# Patient Record
Sex: FEMALE | Employment: OTHER | URBAN - METROPOLITAN AREA
[De-identification: names, ages, dates, MRNs, and addresses within clinical notes are randomized per-mention and may not be internally consistent; named-entity substitution may affect disease eponyms.]

---

## 2017-02-07 ENCOUNTER — OFFICE VISIT (OUTPATIENT)
Dept: VASCULAR SURGERY | Age: 62
End: 2017-02-07

## 2017-02-07 VITALS
HEART RATE: 78 BPM | BODY MASS INDEX: 51.61 KG/M2 | RESPIRATION RATE: 20 BRPM | SYSTOLIC BLOOD PRESSURE: 144 MMHG | HEIGHT: 59 IN | WEIGHT: 256 LBS | DIASTOLIC BLOOD PRESSURE: 80 MMHG

## 2017-02-07 DIAGNOSIS — R09.89 BILATERAL CAROTID BRUITS: Primary | ICD-10-CM

## 2017-02-07 RX ORDER — BENZONATATE 100 MG/1
100 CAPSULE ORAL
COMMUNITY
End: 2018-08-06

## 2017-02-07 RX ORDER — GUAIFENESIN 100 MG/5ML
200 SOLUTION ORAL
COMMUNITY
End: 2018-08-06

## 2017-02-07 NOTE — MR AVS SNAPSHOT
Visit Information Date & Time Provider Department Dept. Phone Encounter #  
 2/7/2017  9:30 AM Elvie [facility] [phone] [id number] Follow-up Instructions Return in about 3 weeks (around 2/28/2017). Your Appointments 2/10/2017 10:45 AM  
Office Visit with Earnstine Bence, MD [address] [place] [address]) Appt Note: 3 month follow up appointment [address] [place] [address]  
  
   
 [address]  
  
    
 2/28/2017 11:00 AM  
PROCEDURE with [facility]  
[facility] (DILAN SCHEDULING) Appt Note: cv 3 wks darryn [address] [address]  
[phone] [address] [place] [address]  
  
    
 3/7/2017 10:15 AM  
Follow Up with BRIDGET Ramsey  
[facility] (DILAN SCHEDULING) Appt Note: 3 weeks fup after studies at the [address] [place] [address]. PT ok to see Sushil Della [address] [address]  
[phone]  
  
   
 [address] [address] 5/24/2017  1:30 PM  
XRAY Visit with Kylee Hong [facility]. [address] ([facility]) Appt Note: 6 month f/u; kub prior and CMP  
 [address] [facility] [phone]  
  
    
 5/24/2017  1:45 PM  
ESTABLISHED PATIENT with Krzysztof Talley MD  
[facility]. [address] [facility]) Appt Note: 6 month f/u; kub prior and CMP  
 [address] [facility] [address]  
[phone]  
  
   
 [facility] 68 [address] Upcoming Health Maintenance Date Due Hepatitis C Screening 1955 FOOT EXAM Q1 5/2/1963 MICROALBUMIN Q1 5/2/1963 EYE EXAM RETINAL OR DILATED Q1 5/2/1963 Pneumococcal 19-64 Highest Risk (1 of 3 - PCV13) 5/2/1972 DTaP/Tdap/Td series (1 - Tdap) 5/2/1974 PAP AKA CERVICAL CYTOLOGY 5/2/1974 FOBT Q 1 YEAR AGE 50-75 5/2/2003 HEMOGLOBIN A1C Q6M 3/25/2011 LIPID PANEL Q1 9/25/2011 ZOSTER VACCINE AGE 60> 5/2/2013 INFLUENZA AGE 9 TO ADULT 8/1/2016 BREAST CANCER SCRN MAMMOGRAM 9/22/2017 Allergies as of 2/7/2017  Review Complete On: 2/7/2017 By: Kya Green MD  
  
 Severity Noted Reaction Type Reactions Ampicillin  02/16/2012    Hives Ceftriaxone  11/16/2016    Other (comments) Patient received medication for UTI treatment and immediately developed itching and anxiousness. Smiley  06/05/2015    Hives Not allergic anymore, it was only the pesticide Current Immunizations  Reviewed on 7/3/2015 No immunizations on file. Not reviewed this visit You Were Diagnosed With   
  
 Codes Comments Bilateral carotid bruits    -  Primary ICD-10-CM: R09.89 ICD-9-CM: 426. 9 Vitals BP Pulse Resp Height(growth percentile) Weight(growth percentile) BMI  
 144/80 (BP 1 Location: Left arm, BP Patient Position: Sitting) 78 20 4' 11\" (1.499 m) 256 lb (116.1 kg) 51.71 kg/m2 OB Status Smoking Status Hysterectomy Never Smoker Vitals History BMI and BSA Data Body Mass Index Body Surface Area 51.71 kg/m 2 2.2 m 2 Preferred Pharmacy Pharmacy Name [facility]  [address] [place] [address], [address] [place] [address],[address] [place] [address] [phone] Your Updated Medication List  
  
   
This list is accurate as of: 2/7/17 10:14 AM.  Always use your most recent med list.  
  
  
  
  
 acetohydroxamic acid 250 mg Tab Commonly known as:  Black & Pink Take 1 Tab by mouth two (2) times daily (after meals). Indications: BACTERIAL URINARY TRACT INFECTION, STRUVITE RENAL CALCULI  
  
 benzonatate 100 mg capsule Commonly known as:  TESSALON Take 100 mg by mouth three (3) times daily as needed for Cough. Cholecalciferol (Vitamin D3) 50,000 unit Cap Take  by mouth every seven (7) days. guaiFENesin 100 mg/5 mL liquid Commonly known as:  ROBITUSSIN Take 200 mg by mouth three (3) times daily as needed for Cough. HumaLOG KwikPen 100 unit/mL kwikpen Generic drug:  insulin lispro 10 Units by SubCUTAneous route Before breakfast, lunch, dinner and at bedtime. letrozole 2.5 mg tablet Commonly known as:  [facility] Take 1 Tab by mouth daily. LEVOTHYROXINE PO Take 0.88 mg by mouth once. losartan 50 mg tablet Commonly known as:  COZAAR  
daily. METANX 3-35-2 mg Tab tab Generic drug:  L-Mfolate-B6 Phos-Methyl-B12 Take 2.8 mg by mouth.  
  
 multivitamin tablet Commonly known as:  ONE A DAY Take 1 Tab by mouth daily. oxyCODONE-acetaminophen 5-325 mg per tablet Commonly known as:  PERCOCET  
1 or 2 tablets by mouth every 4 hours as needed  
  
 temazepam 15 mg capsule Commonly known as:  RESTORIL Take 1 tab po Q HS may, repeat in 1 hour if needed VITAMIN C 500 mg tablet Generic drug:  ascorbic acid (vitamin C) Take  by mouth. ZyrTEC 10 mg tablet Generic drug:  cetirizine Take  by mouth daily. Follow-up Instructions Return in about 3 weeks (around 2/28/2017). To-Do List   
 02/17/2017 Imaging:  DUPLEX CAROTID BILATERAL AMB Introducing hospitals & HEALTH SERVICES! Todd Fontana introduces [employer] patient portal. Now you can access parts of your medical record, email your doctor's office, and request medication refills online. 1. In your internet browser, go to [email or web address] 2. Click on the First Time User? Click Here link in the Sign In box. You will see the New Member Sign Up page. 3. Enter your [employer] Access Code exactly as it appears below. You will not need to use this code after youve completed the sign-up process. If you do not sign up before the expiration date, you must request a new code. · [employer] Access Code: [email or web address] Expires: 5/8/2017  9:32 AM 
 
4. Enter the last four digits of your Social Security Number (xxxx) and Date of Birth (mm/dd/yyyy) as indicated and click Submit. You will be taken to the next sign-up page. 5. Create a [employer] ID. This will be your [employer] login ID and cannot be changed, so think of one that is secure and easy to remember. 6. Create a [employer] password. You can change your password at any time. 7. Enter your Password Reset Question and Answer. This can be used at a later time if you forget your password. 8. Enter your e-mail address. You will receive e-mail notification when new information is available in [address] 9. Click Sign Up. You can now view and download portions of your medical record. 10. Click the Download Summary menu link to download a portable copy of your medical information. If you have questions, please visit the Frequently Asked Questions section of the [employer] website. Remember, [employer] is NOT to be used for urgent needs. For medical emergencies, dial 911. Now available from your iPhone and Android! Please provide this summary of care documentation to your next provider. Your primary care clinician is listed as Jorge Paredes. If you have any questions after today's visit, please call [phone].

## 2017-02-07 NOTE — PROGRESS NOTES
[facility]    Chief Complaint   Patient presents with    New Patient    Carotid Artery Stenosis       History and Physical    Most pleasant 57-year-old female sent for evaluation of carotid bruit today. She was seeing her OB/GYN Dr. Nando Lara and a carotid bruit was auscultated. She does have a strong family history for stroke. Her risk factors include hypertension, diabetes, and age. She is a non-smoker with no history of renal failure. No history of chest pain or shortness of breath. No cardiovascular disease with the exception of hypertension. She has been treated for breast cancer she has a right mastectomy she does have some resulting lymphedema with a very thorough lymph node dissection. She has been cancer free since her surgery. She is also treated for asthma urinary tract infections and periodic kidney stones. She does not have claudication.   She works full-time at [employer] History   Diagnosis Date    Arthritis     Asthma     Asthma     [facility]     Right Breast    Cancer of breast ([facility] 02/18/14     pt states she received the call last Tuesday    Chemotherapy convalescence or palliative care     Diabetes [facility]     Diabetes mellitus     Essential hypertension     Family history of breast cancer     GERD (gastroesophageal reflux disease)     Hypertension     Kidney stone     Morbid obesity ([facility]     Nausea & vomiting     Radiation therapy complication     Renal colic     S/P breast biopsy - right breast calcifications 10/18/10     Staghorn calculus     Thyroid dysfunction      GOITER    UTI (lower urinary tract infection)      Patient Active Problem List   Diagnosis Code    Family history of breast cancer Z80.3    S/P breast biopsy - right breast calcifications 10/18/10 Z98.890    DM (diabetes mellitus) ([facility] E11.9    Breast CA ([facility] C50.919    Skin infection L08.9    Lymphedema of upper extremity following lymphadenectomy E89.89, I89.0  Insomnia G47.00    Arthritis M19.90    Personal history of malignant neoplasm of breast Z85.3     Past Surgical History   Procedure Laterality Date    Cystoscopy      Hx cyst removal      Hx lithotripsy      Hx breast biopsy  10/18/10     stereotactic - right breast calcifications    Hx tubal ligation      Hx gyn       urethra reconstruction x3    Hx hysterectomy      Hx urological       4 diverticuli repaired    Hx breast biopsy  2/14/2014     RIGHT BREAST BIOPSY performed by Fanny Morgan MD at [address] [place] [address] modified radical mastectomy  3/21/2014     RIGHT MODIFIED RADICAL MASTECTOMY SENTINAL LYMPH NODE BIOPSY performed by Fanny Morgan MD at [address] [place] [address] Hx vascular access  2014     Current Outpatient Prescriptions   Medication Sig Dispense Refill    benzonatate (TESSALON) 100 mg capsule Take 100 mg by mouth three (3) times daily as needed for Cough.  guaiFENesin (ROBITUSSIN) 100 mg/5 mL liquid Take 200 mg by mouth three (3) times daily as needed for Cough.  acetohydroxamic acid (LITHOSTAT) 250 mg tab Take 1 Tab by mouth two (2) times daily (after meals). Indications: BACTERIAL URINARY TRACT INFECTION, STRUVITE RENAL CALCULI 60 Tab 12    oxyCODONE-acetaminophen (PERCOCET) 5-325 mg per tablet 1 or 2 tablets by mouth every 4 hours as needed 120 Tab 0    letrozole (FEMARA) 2.5 mg tablet Take 1 Tab by mouth daily. 90 Tab 3    L-Mfolate-B6 Phos-Methyl-B12 (METANX) 3-35-2 mg tab Take 2.8 mg by mouth.  losartan (COZAAR) 50 mg tablet daily.  HUMALOG KWIKPEN 100 unit/mL kwikpen 10 Units by SubCUTAneous route Before breakfast, lunch, dinner and at bedtime.  temazepam (RESTORIL) 15 mg capsule Take 1 tab po Q HS may, repeat in 1 hour if needed 60 Cap 2    cetirizine (ZYRTEC) 10 mg tablet Take  by mouth daily.  multivitamin (ONE A DAY) tablet Take 1 Tab by mouth daily.  Cholecalciferol, Vitamin D3, 50,000 unit cap Take  by mouth every seven (7) days.       ascorbic acid (VITAMIN C) 500 mg tablet Take  by mouth.  LEVOTHYROXINE SODIUM (LEVOTHYROXINE PO) Take 0.88 mg by mouth once. Allergies   Allergen Reactions    Ampicillin Hives    Ceftriaxone Other (comments)     Patient received medication for UTI treatment and immediately developed itching and anxiousness.  Strawberry Hives     Not allergic anymore, it was only the pesticide     Social History     Social History    Marital status: SINGLE     Spouse name: N/A    Number of children: N/A    Years of education: N/A     Occupational History    Not on file. Social History Main Topics    Smoking status: Never Smoker    Smokeless tobacco: Never Used    Alcohol use 0.5 oz/week     1 Glasses of wine per week      Comment: occasionally    Drug use: No    Sexual activity: Not on file     Other Topics Concern    Not on file     Social History Narrative      Family History   Problem Relation Age of Onset    Breast Cancer Sister     Diabetes Mother     Hypertension Mother     Breast Cancer Sister     Hypertension Other      Parent    Diabetes Other      parent    Stroke Other      parent    Hypertension Other      sibling    Diabetes Other      sibling    Osteoporosis Other      sibling       Review of Systems    A full review of systems was completed times ten organ systems and was deemed negative unless otherwise mentioned in the HPI.      Physical Exam:    Visit Vitals    /80 (BP 1 Location: Left arm, BP Patient Position: Sitting)    Pulse 78    Resp 20    Ht 4' 11\" (1.499 m)    Wt 256 lb (116.1 kg)    BMI 51.71 kg/m2      Youthful appearing 63 no distress  No facial symmetry pupils are reactive smile is symmetrical  Vision speech hearing intact  Neck no JVD soft bruit notable  Chest clear  Cardiac regular  Abdomen soft nontender nondistended  Extremities without edema  She has easily palpable pedal pulses  She does have profound lymphedema of her right upper extremity she has no ischemia she is wearing a lymphedema stocking today  No skin ulcerations notable  Range of motion strength are equal  Date of her chart review of prior CAT scans no evidence of aneurysm  No prior carotid Doppler    Impression and Plan:    ICD-10-CM ICD-9-CM    1. Bilateral carotid bruits R09.89 785.9 DUPLEX CAROTID BILATERAL AMB    will send her for carotid Doppler to evaluate bruit and follow-up with results  Thanks so much for allowing me to opportunity to evaluate her carotid bruit  Orders Placed This Encounter    DUPLEX CAROTID BILATERAL AMB    benzonatate (TESSALON) 100 mg capsule    guaiFENesin (ROBITUSSIN) 100 mg/5 mL liquid       Follow-up Disposition:  Return in about 3 weeks (around 2/28/2017). Kenneth Arriaga MD    PLEASE NOTE:  This document has been produced using voice recognition software. Unrecognized errors in transcription may be present.

## 2017-02-10 ENCOUNTER — OFFICE VISIT (OUTPATIENT)
Dept: ONCOLOGY | Age: 62
End: 2017-02-10

## 2017-02-10 ENCOUNTER — HOSPITAL ENCOUNTER (OUTPATIENT)
Dept: LAB | Age: 62
Discharge: HOME | End: 2017-02-10
Payer: COMMERCIAL

## 2017-02-10 ENCOUNTER — HOSPITAL ENCOUNTER (OUTPATIENT)
Dept: ONCOLOGY | Age: 62
Discharge: HOME | End: 2017-02-10

## 2017-02-10 VITALS
WEIGHT: 251 LBS | HEART RATE: 91 BPM | TEMPERATURE: 98.3 F | HEIGHT: 59 IN | DIASTOLIC BLOOD PRESSURE: 81 MMHG | SYSTOLIC BLOOD PRESSURE: 140 MMHG | BODY MASS INDEX: 50.6 KG/M2

## 2017-02-10 DIAGNOSIS — E89.89 LYMPHEDEMA OF UPPER EXTREMITY FOLLOWING LYMPHADENECTOMY: ICD-10-CM

## 2017-02-10 DIAGNOSIS — C50.919 MALIGNANT NEOPLASM OF FEMALE BREAST, UNSPECIFIED LATERALITY, UNSPECIFIED SITE OF BREAST: ICD-10-CM

## 2017-02-10 DIAGNOSIS — M19.90 ARTHRITIS: ICD-10-CM

## 2017-02-10 DIAGNOSIS — C50.919 MALIGNANT NEOPLASM OF FEMALE BREAST, UNSPECIFIED LATERALITY, UNSPECIFIED SITE OF BREAST: Primary | ICD-10-CM

## 2017-02-10 DIAGNOSIS — I89.0 LYMPHEDEMA OF UPPER EXTREMITY FOLLOWING LYMPHADENECTOMY: ICD-10-CM

## 2017-02-10 LAB
ALBUMIN SERPL BCP-MCNC: 3.4 G/DL (ref 3.4–5)
ALBUMIN/GLOB SERPL: 0.9 {RATIO} (ref 0.8–1.7)
ALP SERPL-CCNC: 153 U/L (ref 45–117)
ALT SERPL-CCNC: 20 U/L (ref 13–56)
ANION GAP BLD CALC-SCNC: 7 MMOL/L (ref 3–18)
AST SERPL W P-5'-P-CCNC: 8 U/L (ref 15–37)
BASO+EOS+MONOS # BLD AUTO: 0.4 K/UL (ref 0–2.3)
BASO+EOS+MONOS # BLD AUTO: 5 % (ref 0.1–17)
BILIRUB SERPL-MCNC: 0.3 MG/DL (ref 0.2–1)
BUN SERPL-MCNC: 18 MG/DL (ref 7–18)
BUN/CREAT SERPL: 13 (ref 12–20)
CALCIUM SERPL-MCNC: 8.6 MG/DL (ref 8.5–10.1)
CHLORIDE SERPL-SCNC: 106 MMOL/L (ref 100–108)
CO2 SERPL-SCNC: 28 MMOL/L (ref 21–32)
CREAT SERPL-MCNC: 1.41 MG/DL (ref 0.6–1.3)
DIFFERENTIAL METHOD BLD: ABNORMAL
ERYTHROCYTE [DISTWIDTH] IN BLOOD BY AUTOMATED COUNT: 13.2 % (ref 11.5–14.5)
GLOBULIN SER CALC-MCNC: 3.7 G/DL (ref 2–4)
GLUCOSE SERPL-MCNC: 297 MG/DL (ref 74–99)
HCT VFR BLD AUTO: 37.1 % (ref 36–48)
HGB BLD-MCNC: 12.3 G/DL (ref 12–16)
LYMPHOCYTES # BLD AUTO: 26 % (ref 14–44)
LYMPHOCYTES # BLD: 2 K/UL (ref 1.1–5.9)
MCH RBC QN AUTO: 25.8 PG (ref 25–35)
MCHC RBC AUTO-ENTMCNC: 33.2 G/DL (ref 31–37)
MCV RBC AUTO: 77.9 FL (ref 78–102)
NEUTS SEG # BLD: 5.5 K/UL (ref 1.8–9.5)
NEUTS SEG NFR BLD AUTO: 69 % (ref 40–70)
PLATELET # BLD AUTO: 313 K/UL (ref 140–440)
POTASSIUM SERPL-SCNC: 4.7 MMOL/L (ref 3.5–5.5)
PROT SERPL-MCNC: 7.1 G/DL (ref 6.4–8.2)
RBC # BLD AUTO: 4.76 M/UL (ref 4.1–5.1)
SODIUM SERPL-SCNC: 141 MMOL/L (ref 136–145)
WBC # BLD AUTO: 7.9 K/UL (ref 4.5–13)

## 2017-02-10 PROCEDURE — 36415 COLL VENOUS BLD VENIPUNCTURE: CPT | Performed by: NURSE PRACTITIONER

## 2017-02-10 PROCEDURE — 80053 COMPREHEN METABOLIC PANEL: CPT | Performed by: NURSE PRACTITIONER

## 2017-02-10 PROCEDURE — 86300 IMMUNOASSAY TUMOR CA 15-3: CPT | Performed by: NURSE PRACTITIONER

## 2017-02-10 RX ORDER — IBUPROFEN 600 MG/1
600 TABLET ORAL
Qty: 120 TAB | Refills: 0 | Status: SHIPPED | OUTPATIENT
Start: 2017-02-10 | End: 2018-04-10 | Stop reason: SDUPTHER

## 2017-02-10 RX ORDER — OXYCODONE AND ACETAMINOPHEN 5; 325 MG/1; MG/1
TABLET ORAL
Qty: 120 TAB | Refills: 0 | Status: SHIPPED | OUTPATIENT
Start: 2017-02-10 | End: 2017-05-12 | Stop reason: SDUPTHER

## 2017-02-10 NOTE — PROGRESS NOTES
Hematology/Oncology  Progress Note    Name: Cheli Screen  Date: 2/10/2017  : 1955      Ms. Marylu Javier is a 59year old female who was seen for management of her invasive ductal adenocarcinoma right and anemia. Current therapy: Femara 2.5 mg daily and oral iron supplementation daily. Subjective:     Ms. Marylu Javier is a 59-year-old woman who is currently taking Femara 2.5 mg daily as long-term management for her invasive ductal carcinoma of the breast.  She continues to tolerate the medication well. The patient reports that she is doing her breast self-exam on a monthly basis. Additionally, she continues to take the iron supplement once daily. Today she complains of left breast pain for the past week. She also reports that her lymphedema sleeve is too tight. She denies CP or SOB. Her appetite is reasonably well. She continues to use her percocet PRN. She is asking for a refill at this time. Past medical history, family history, and social history are reviewed and remains unchanged.   Past Medical History   Diagnosis Date    Arthritis     Asthma     Asthma     Breast CA ([facility] 2014     Right Breast    Cancer of breast ([facility] 14     pt states she received the call last Tuesday    Chemotherapy convalescence or palliative care     Diabetes ([facility]     Diabetes mellitus     Essential hypertension     Family history of breast cancer     GERD (gastroesophageal reflux disease)     Hypertension     Kidney stone     Morbid obesity ([address]     Nausea & vomiting     Radiation therapy complication     Renal colic     S/P breast biopsy - right breast calcifications 10/18/10     Staghorn calculus     Thyroid dysfunction      GOITER    UTI (lower urinary tract infection)      Past Surgical History   Procedure Laterality Date    Cystoscopy      Hx cyst removal      Hx lithotripsy      Hx breast biopsy  10/18/10     stereotactic - right breast calcifications    Hx tubal ligation      Hx gyn urethra reconstruction x3    Hx hysterectomy      Hx urological       4 diverticuli repaired    Hx breast biopsy  2/14/2014     RIGHT BREAST BIOPSY performed by Francisco Cates MD at [address] [place] [address] Hx modified radical mastectomy  3/21/2014     RIGHT MODIFIED RADICAL MASTECTOMY SENTINAL LYMPH NODE BIOPSY performed by Francisco Cates MD at [address] [place] [address] Hx vascular access  2014     Social History     Social History    Marital status: SINGLE     Spouse name: N/A    Number of children: N/A    Years of education: N/A     Occupational History    Not on file. Social History Main Topics    Smoking status: Never Smoker    Smokeless tobacco: Never Used    Alcohol use 0.5 oz/week     1 Glasses of wine per week      Comment: occasionally    Drug use: No    Sexual activity: Not on file     Other Topics Concern    Not on file     Social History Narrative     Family History   Problem Relation Age of Onset    Breast Cancer Sister     Diabetes Mother     Hypertension Mother     Breast Cancer Sister     Hypertension Other      Parent    Diabetes Other      parent    Stroke Other      parent    Hypertension Other      sibling    Diabetes Other      sibling    Osteoporosis Other      sibling     Current Outpatient Prescriptions   Medication Sig Dispense Refill    oxyCODONE-acetaminophen (PERCOCET) 5-325 mg per tablet 1 or 2 tablets by mouth every 4 hours as needed 120 Tab 0    ibuprofen (MOTRIN) 600 mg tablet Take 1 Tab by mouth every six (6) hours as needed for Pain. 120 Tab 0    benzonatate (TESSALON) 100 mg capsule Take 100 mg by mouth three (3) times daily as needed for Cough.  guaiFENesin (ROBITUSSIN) 100 mg/5 mL liquid Take 200 mg by mouth three (3) times daily as needed for Cough.  acetohydroxamic acid (LITHOSTAT) 250 mg tab Take 1 Tab by mouth two (2) times daily (after meals).  Indications: BACTERIAL URINARY TRACT INFECTION, STRUVITE RENAL CALCULI 60 Tab 12    letrozole [facility]) 2.5 mg tablet Take 1 Tab by mouth daily. 90 Tab 3    L-Mfolate-B6 Phos-Methyl-B12 (METANX) 3-35-2 mg tab Take 2.8 mg by mouth.  losartan (COZAAR) 50 mg tablet daily.  HUMALOG KWIKPEN 100 unit/mL kwikpen 10 Units by SubCUTAneous route Before breakfast, lunch, dinner and at bedtime.  temazepam (RESTORIL) 15 mg capsule Take 1 tab po Q HS may, repeat in 1 hour if needed 60 Cap 2    cetirizine (ZYRTEC) 10 mg tablet Take  by mouth daily.  multivitamin (ONE A DAY) tablet Take 1 Tab by mouth daily.  Cholecalciferol, Vitamin D3, 50,000 unit cap Take  by mouth every seven (7) days.  ascorbic acid (VITAMIN C) 500 mg tablet Take  by mouth.  LEVOTHYROXINE SODIUM (LEVOTHYROXINE PO) Take 0.88 mg by mouth once. Review of Systems  Constitutional: The patient denies acute distress or discomfort  HEENT: The patient denies recent head trauma, eye pain, blurred vision,  hearing deficit, oropharyngeal mucosal pain or lesions, and the patient denies throat pain or discomfort. Lymphatics: The patient denies palpable peripheral lymphadenopathy. Hematologic: The patient denies having bruising, bleeding, or progressive fatigue. Respiratory: Patient denies having shortness of breath, cough, sputum production, fever, or dyspnea on exertion. Cardiovascular: The patient denies having leg pain, leg swelling, heart palpitations, chest permit, chest pain, or lightheadedness. The patient denies having dyspnea on exertion. Gastrointestinal: The patient denies having nausea, emesis, or diarrhea. The patient denies having any hematemesis or blood in the stool. Genitourinary: Patient denies having urinary urgency, frequency, or dysuria. The patient denies having blood in the urine. Psychological: The patient denies having symptoms of nervousness, anxiety, depression, or thoughts of harming himself some of this.   Skin: Patient denies having skin rashes, skin, ulcerations, or unexplained itching or pruritus. Musculoskeletal: The patient has swelling in the right arm due to progressive lymphedema. She is now wearing a lymphedema sleeve. Anterior chest wall and breasts: The patient denies having any palpable mass lesions on her chest wall or breast, but has pain and discomfort in left breast      Objective:     Visit Vitals    /81    Pulse 91    Temp 98.3 °F (36.8 °C)    Ht 4' 11\" (1.499 m)    Wt 113.9 kg (251 lb)    BMI 50.7 kg/m2     ECOG PS=0 Pain score 3/10     Physical Exam:   Gen. Appearance: The patient is in no acute distress. Skin: There is no bruise or rash. HEENT: The exam is unremarkable. Neck: Supple without lymphadenopathy or thyromegaly. Lungs: Clear to auscultation and percussion; there are no wheezes or rhonchi. Heart: Regular rate and rhythm; there are no murmurs, gallops, or rubs. Anterior chest wall. Breasts: There is no evidence of local recurrence of disease. The axilla reveals no palpable axillary lymphadenopathy. Abdomen: Bowel sounds are present and normal.  There is no guarding, tenderness, or hepatosplenomegaly. Extremities: There is no clubbing, cyanosis, or edema. Neurologic: There are no focal neurologic deficits. Lymphatics: There is no palpable peripheral lymphadenopathy. Musculoskeletal: The patient has full range of motion at all joints. There is no evidence of joint deformity or effusions. The patient has  right arm lymphedema extending from the fingers up to the shoulder area on the right. There is  focal joint tenderness in the right axilla. Psychological/psychiatric: There is no clinical evidence of anxiety, depression, or melancholy.     Lab data:      Results for orders placed or performed during the hospital encounter of 02/10/17   CBC WITH 3 PART DIFF     Status: Abnormal   Result Value Ref Range Status    WBC 7.9 4.5 - 13.0 K/uL Final    RBC 4.76 4.10 - 5.10 M/uL Final    HGB 12.3 12.0 - 16.0 g/dL Final    HCT 37.1 36 - 48 % Final    MCV 77.9 (L) 78 - 102 FL Final    MCH 25.8 25.0 - 35.0 PG Final    MCHC 33.2 31 - 37 g/dL Final    RDW 13.2 11.5 - 14.5 % Final    PLATELET 629 157 - 538 K/uL Final    NEUTROPHILS 69 40 - 70 % Final    MIXED CELLS 5 0.1 - 17 % Final    LYMPHOCYTES 26 14 - 44 % Final    ABS. NEUTROPHILS 5.5 1.8 - 9.5 K/UL Final    ABS. MIXED CELLS 0.4 0.0 - 2.3 K/uL Final    ABS. LYMPHOCYTES 2.0 1.1 - 5.9 K/UL Final     Comment: Test performed at [place] [address] Location. Results Reviewed by Medical Director. DF AUTOMATED   Final           Assessment:     1. Malignant neoplasm of female breast, unspecified laterality, unspecified site of breast ([facility]    2. Arthritis    3. Lymphedema of upper extremity following lymphadenectomy        Plan:   Right arm and hand lymphedema:She will be referred to the lymphedema clinic for new measurements and a new sleeve. She is not complaining of any significant pain with the exception of arthritic discomfort in her hands. I advised her to to avoid lifting  more than 10 pounds using the right arm and hand. Breast cancer: I have encouraged patient to continue to her monthly breast self-examinations. At this time I will order a CA 27-29 level, and a comprehensive metabolic panel. The most recent CA 27-29 level was 36.1 at the last visit. I will renew prescription for Percocet 5mg at this time. A mammogram will be ordered, it is time for her yearly exam.    Arthritis: Motrin 600 mg every 6 hours will be ordered and she was instructed to use Percocet for severe pain. I will have the patient return to the clinic again in 4 months.   Orders Placed This Encounter    COMPLETE CBC & AUTO DIFF WBC    InHouse CBC (Pro Breath MD)     Standing Status:   Future     Number of Occurrences:   1     Standing Expiration Date:   0/50/7933    METABOLIC PANEL, COMPREHENSIVE     Standing Status:   Future     Standing Expiration Date:   2/11/2018    CA 27.29     Standing Status:   Future     Standing Expiration Date:   2018    oxyCODONE-acetaminophen (PERCOCET) 5-325 mg per tablet     Si or 2 tablets by mouth every 4 hours as needed     Dispense:  120 Tab     Refill:  0    ibuprofen (MOTRIN) 600 mg tablet     Sig: Take 1 Tab by mouth every six (6) hours as needed for Pain. Dispense:  120 Tab     Refill:  0       John Juan NP  2/10/2017       I have assessed the patient independently and  agree with the full assessment as outlined.   Kaylah Bishop MD, [address]

## 2017-02-10 NOTE — MR AVS SNAPSHOT
Visit Information Date & Time Provider Department Dept. Phone Encounter #  
 2/10/2017 10:45 AM Nataliya Bazzi [address] Office [phone] [id number] Your Appointments 2/28/2017 11:00 AM  
PROCEDURE with [facility]  
[facility] (DILAN SCHEDULING) Appt Note: cv 3 wks darryn [address] [address]  
[phone] [address] [address]  
  
    
 3/7/2017 10:15 AM  
Follow Up with BRIDGET Guallpa  
[facility] (DILAN SCHEDULING) Appt Note: 3 weeks fup after studies at the [address] [place] [address]. PT ok to see Yarelis Levels [address] [address]  
[phone]  
  
   
 [address] [facility] [address] 5/12/2017 11:45 AM  
Office Visit with MD Daniel Echavarriamark [address] [place] [address] [facility]) Appt Note: 3 mo fu  
 [facility] [address] [place] [address]  
[phone]  
  
   
 [facility] [address] 5/24/2017  1:30 PM  
XRAY Visit with Jennifer Velasquez [facility]) Appt Note: 6 month f/u; kub prior and CMP  
 [address] [facility] [address]  
  
    
 5/24/2017  1:45 PM  
ESTABLISHED PATIENT with Eddi Urias MD  
[facility]) Appt Note: 6 month f/u; kub prior and CMP  
 [address] [address]  
[phone]  
  
   
 [facility] [address] Upcoming Health Maintenance Date Due Hepatitis C Screening 1955 FOOT EXAM Q1 5/2/1963 MICROALBUMIN Q1 5/2/1963 EYE EXAM RETINAL OR DILATED Q1 5/2/1963 Pneumococcal 19-64 Highest Risk (1 of 3 - PCV13) 5/2/1972 DTaP/Tdap/Td series (1 - Tdap) 5/2/1974 PAP AKA CERVICAL CYTOLOGY 5/2/1974 FOBT Q 1 YEAR AGE 50-75 5/2/2003 HEMOGLOBIN A1C Q6M 3/25/2011 LIPID PANEL Q1 9/25/2011 ZOSTER VACCINE AGE 60> 5/2/2013 INFLUENZA AGE 9 TO ADULT 8/1/2016 BREAST CANCER SCRN MAMMOGRAM 9/22/2017 Allergies as of 2/10/2017  Review Complete On: 2/7/2017 By: Lilia Pierce MD  
  
 Severity Noted Reaction Type Reactions Ampicillin  02/16/2012    Hives Ceftriaxone  11/16/2016    Other (comments) Patient received medication for UTI treatment and immediately developed itching and anxiousness. [place]  06/05/2015    Hives Not allergic anymore, it was only the pesticide Current Immunizations  Reviewed on 7/3/2015 No immunizations on file. Not reviewed this visit You Were Diagnosed With   
  
 Codes Comments Malignant neoplasm of female breast, unspecified laterality, unspecified site of breast ([facility]    -  Primary ICD-10-CM: C50.919 ICD-9-CM: 174.9 Vitals BP Pulse Temp Height(growth percentile) Weight(growth percentile) BMI  
 140/81 91 98.3 °F (36.8 °C) 4' 11\" (1.499 m) 251 lb (113.9 kg) 50.7 kg/m2 OB Status Smoking Status Hysterectomy Never Smoker BMI and BSA Data Body Mass Index Body Surface Area 50.7 kg/m 2 2.18 m 2 Preferred Pharmacy Pharmacy Name [facility], [address] [place] [address],[address] [place] [address] [phone] Your Updated Medication List  
  
   
This list is accurate as of: 2/10/17 11:55 AM.  Always use your most recent med list.  
  
  
  
  
 acetohydroxamic acid 250 mg Tab Commonly known as:  Black & Pink Take 1 Tab by mouth two (2) times daily (after meals). Indications: BACTERIAL URINARY TRACT INFECTION, STRUVITE RENAL CALCULI  
  
 benzonatate 100 mg capsule Commonly known as:  TESSALON Take 100 mg by mouth three (3) times daily as needed for Cough. Cholecalciferol (Vitamin D3) 50,000 unit Cap Take  by mouth every seven (7) days. guaiFENesin 100 mg/5 mL liquid Commonly known as:  ROBITUSSIN Take 200 mg by mouth three (3) times daily as needed for Cough. HumaLOG KwikPen 100 unit/mL kwikpen Generic drug:  insulin lispro 10 Units by SubCUTAneous route Before breakfast, lunch, dinner and at bedtime. letrozole 2.5 mg tablet Commonly known as:  [facility] Take 1 Tab by mouth daily. LEVOTHYROXINE PO Take 0.88 mg by mouth once. losartan 50 mg tablet Commonly known as:  COZAAR  
daily. METANX 3-35-2 mg Tab tab Generic drug:  L-Mfolate-B6 Phos-Methyl-B12 Take 2.8 mg by mouth.  
  
 multivitamin tablet Commonly known as:  ONE A DAY Take 1 Tab by mouth daily. oxyCODONE-acetaminophen 5-325 mg per tablet Commonly known as:  PERCOCET  
1 or 2 tablets by mouth every 4 hours as needed  
  
 temazepam 15 mg capsule Commonly known as:  RESTORIL Take 1 tab po Q HS may, repeat in 1 hour if needed VITAMIN C 500 mg tablet Generic drug:  ascorbic acid (vitamin C) Take  by mouth. ZyrTEC 10 mg tablet Generic drug:  cetirizine Take  by mouth daily. Prescriptions Printed Refills  
 oxyCODONE-acetaminophen (PERCOCET) 5-325 mg per tablet 0 Si or 2 tablets by mouth every 4 hours as needed Class: Print We Performed the Following COMPLETE CBC & AUTO DIFF WBC [55864 CPT(R)] To-Do List   
 02/10/2017 Lab:  CBC WITH 3 PART DIFF   
  
 2017 Lab:  CA 27.29   
  
 2017 Lab:  METABOLIC PANEL, COMPREHENSIVE Introducing [facility]! Maria De Jesus Rubio introduces [email or web address] patient portal. Now you can access parts of your medical record, email your doctor's office, and request medication refills online. 1. In your internet browser, go to [email or web address] 2. Click on the First Time User? Click Here link in the Sign In box. You will see the New Member Sign Up page. 3. Enter your [email or web address] Access Code exactly as it appears below. You will not need to use this code after youve completed the sign-up process. If you do not sign up before the expiration date, you must request a new code. · [email or web address] Access Code: [email or web address] Expires: 5/8/2017  9:32 AM 
 
4. Enter the last four digits of your Social Security Number (xxxx) and Date of Birth (mm/dd/yyyy) as indicated and click Submit. You will be taken to the next sign-up page. 5. Create a [email or web address] ID. This will be your [email or web address] login ID and cannot be changed, so think of one that is secure and easy to remember. 6. Create a [email or web address] password. You can change your password at any time. 7. Enter your Password Reset Question and Answer. This can be used at a later time if you forget your password. 8. Enter your e-mail address. You will receive e-mail notification when new information is available in [address] 9. Click Sign Up. You can now view and download portions of your medical record. 10. Click the Download Summary menu link to download a portable copy of your medical information. If you have questions, please visit the Frequently Asked Questions section of the [email or web address] website. Remember, [email or web address] is NOT to be used for urgent needs. For medical emergencies, dial 911. Now available from your iPhone and Android! Please provide this summary of care documentation to your next provider. Your primary care clinician is listed as Selin Gonzalez. If you have any questions after today's visit, please call [phone].

## 2017-02-11 LAB — CANCER AG27-29 SERPL-ACNC: 30 U/ML (ref 0–38.6)

## 2017-02-28 ENCOUNTER — OFFICE VISIT (OUTPATIENT)
Dept: VASCULAR SURGERY | Age: 62
End: 2017-02-28

## 2017-02-28 DIAGNOSIS — R09.89 BILATERAL CAROTID BRUITS: ICD-10-CM

## 2017-02-28 NOTE — PROCEDURES
[facility] Vein   *** FINAL REPORT ***    Name: Marty Rush  MRN: [id number]       Outpatient  : 02 May 1955  HIS Order #: [id number]  [id number] [facility] Visit #: [id number]  Date: 2017    TYPE OF TEST: Cerebrovascular Duplex    REASON FOR TEST  Carotid bruit    Right Carotid:-             Proximal               Mid                 Distal  cm/s  Systolic  Diastolic  Systolic  Diastolic  Systolic  Diastolic  CCA:     91.6      16.0                            61.0      21.0  Bulb:  ECA:     92.0      15.0  ICA:     45.0      16.0       38.0      16.0       71.0      32.0  ICA/CCA:  1.2       1.5    ICA Stenosis: <50%    Right Vertebral:-  Finding: Antegrade  Sys:       49.0  Ijeoma:       11.0    Right Subclavian:    Left Carotid:-            Proximal                Mid                 Distal  cm/s  Systolic  Diastolic  Systolic  Diastolic  Systolic  Diastolic  CCA:     47.7      25.0                            62.0      21.0  Bulb:  ECA:     49.0      12.0  ICA:     56.0      20.0       48.0      22.0       56.0      25.0  ICA/CCA:  0.6       0.8    ICA Stenosis: <50%    Left Vertebral:-  Finding: Antegrade  Sys:       55.0  Ijeoma:       20.0    Left Subclavian:    INTERPRETATION/FINDINGS  Duplex images were obtained using 2-D gray scale, color flow and  spectral doppler analysis. 1. Minimal intimal thickening of the right internal carotid artery in  the less than 50% range by criteria. 2. Mild <50% plaquing in the left internal carotid artery. 3. No significant stenosis in the external carotid arteries  bilaterally. 4. Antegrade flow in both vertebral arteries. Plaque Morphology:  1. Homogeneous plaque in the bulb and right ICA. 2. Varying Density plaque in the bulb and left ICA. No prior exam to compare. ADDITIONAL COMMENTS    I have personally reviewed the data relevant to the interpretation of  this  study.     TECHNOLOGIST: Juvencio Alexander RDMS  Signed: 2017 01:32 PM    PHYSICIAN: William Braxton LISANDRO  Signed: 02/28/2017 01:51 PM

## 2017-03-07 ENCOUNTER — OFFICE VISIT (OUTPATIENT)
Dept: VASCULAR SURGERY | Age: 62
End: 2017-03-07

## 2017-03-07 VITALS
WEIGHT: 251 LBS | BODY MASS INDEX: 50.6 KG/M2 | SYSTOLIC BLOOD PRESSURE: 122 MMHG | HEART RATE: 64 BPM | HEIGHT: 59 IN | RESPIRATION RATE: 12 BRPM | DIASTOLIC BLOOD PRESSURE: 78 MMHG

## 2017-03-07 DIAGNOSIS — R09.89 CAROTID BRUIT, UNSPECIFIED LATERALITY: Primary | ICD-10-CM

## 2017-03-07 NOTE — PROGRESS NOTES
Ms Garcia Elizalde had been referred by her OB/GYN a few weeks ago, concerned for a carotid bruit auscultated on a physical exam  She was referred here for evaluation and Dr Steven Ayoub ordered carotid duplex   She is here for results today, and reviewed as follows:    1. Minimal intimal thickening of the right internal carotid artery in  the less than 50% range by criteria. 2. Mild <50% plaquing in the left internal carotid artery. 3. No significant stenosis in the external carotid arteries  bilaterally. 4. Antegrade flow in both vertebral arteries. Plaque Morphology:  1. Homogeneous plaque in the bulb and right ICA. 2. Varying Density plaque in the bulb and left ICA. No prior exam to compare. I explained no significant stenosis noted so only needs surveillance between every 1-2 years  I did stress risk factor control including good management of BP and A1C  I mentioned a statin but she is not fond of the idea due to concern of side effects. I suggested talking with her PCP about the supplement, red yeast rice, and their opinion of that as an alternate therapy.  Of course, also maintain good diet/weight and some type of regular exercise    She is relieved and acknowledges an understanding  Agreed on an 18 month follow up for next interval

## 2017-03-07 NOTE — MR AVS SNAPSHOT
Visit Information Date & Time Provider Department Dept. Phone Encounter #  
 3/7/2017 10:15 AM BRIDGET Mcfarlane [facility] [phone] [id number] Follow-up Instructions Return in about 18 months (around 9/7/2018). Your Appointments 5/12/2017 11:45 AM  
Office Visit with MD Wilda Dean [address] [place] [address] [facility]: 3 mo fu  
 [facility] [address] [facility] [address]  
[phone]  
  
   
 [facility] 9938 [facility] 3500  35 [facility] 5/24/2017  1:30 PM  
XRAY Visit with Jovany Moran [facility] Appt Note: 6 month f/u; kub prior and CMP  
 [address] [address] [address]  
  
   
 [address] [phone]  
  
    
 5/24/2017  1:45 PM  
ESTABLISHED PATIENT with Ady Vivar MD  
[facility]) Appt Note: 6 month f/u; kub prior and CMP  
 [address] [address] [address]  
  
   
 [address] [address]  
  
    
 9/7/2018 10:00 AM  
PROCEDURE with [facility] IMAGING 1  
[facility] (DILAN SCHEDULING) Appt Note: cv 18mos knaak [address] [address]  
[phone] [address] [place] [address]  
  
    
 9/20/2018 10:00 AM  
Follow Up with BRIDGET Mcfarlane  
[facility] (DILAN SCHEDULING) Appt Note: 18 month fu after study at our lab on 9/7/2018  
 [address] [address]  
[phone]  
  
   
 [address] [address] Upcoming Health Maintenance Date Due Hepatitis C Screening 1955 FOOT EXAM Q1 5/2/1963 MICROALBUMIN Q1 5/2/1963 EYE EXAM RETINAL OR DILATED Q1 5/2/1963 Pneumococcal 19-64 Highest Risk (1 of 3 - PCV13) 5/2/1972 DTaP/Tdap/Td series (1 - Tdap) 5/2/1974 PAP AKA CERVICAL CYTOLOGY 5/2/1974 FOBT Q 1 YEAR AGE 50-75 5/2/2003 HEMOGLOBIN A1C Q6M 3/25/2011 LIPID PANEL Q1 9/25/2011 ZOSTER VACCINE AGE 60> 5/2/2013 INFLUENZA AGE 9 TO ADULT 8/1/2016 BREAST CANCER SCRN MAMMOGRAM 9/22/2017 Allergies as of 3/7/2017  Review Complete On: 3/7/2017 By: Kinga Flowers Severity Noted Reaction Type Reactions Ampicillin  02/16/2012    Hives Ceftriaxone  11/16/2016    Other (comments) Patient received medication for UTI treatment and immediately developed itching and anxiousness. [place]  06/05/2015    Hives Not allergic anymore, it was only the pesticide Current Immunizations  Reviewed on 7/3/2015 No immunizations on file. Not reviewed this visit You Were Diagnosed With   
  
 Codes Comments Carotid bruit, unspecified laterality    -  Primary ICD-10-CM: R09.89 ICD-9-CM: 732. 9 Vitals BP Pulse Resp Height(growth percentile) Weight(growth percentile) BMI  
 122/78 (BP 1 Location: Left arm, BP Patient Position: Sitting) 64 12 4' 11\" (1.499 m) 251 lb (113.9 kg) 50.7 kg/m2 OB Status Smoking Status Hysterectomy Never Smoker BMI and BSA Data Body Mass Index Body Surface Area 50.7 kg/m 2 2.18 m 2 Preferred Pharmacy Pharmacy Name [facility]  [address] [place] [address], [address] [place] [address],[address] [place] [address] [phone] Your Updated Medication List  
  
   
This list is accurate as of: 3/7/17 10:45 AM.  Always use your most recent med list.  
  
  
  
  
 acetohydroxamic acid 250 mg Tab Commonly known as:  Black & Pink Take 1 Tab by mouth two (2) times daily (after meals). Indications: BACTERIAL URINARY TRACT INFECTION, STRUVITE RENAL CALCULI  
  
 benzonatate 100 mg capsule Commonly known as:  TESSALON Take 100 mg by mouth three (3) times daily as needed for Cough. Cholecalciferol (Vitamin D3) 50,000 unit Cap Take  by mouth every seven (7) days. guaiFENesin 100 mg/5 mL liquid Commonly known as:  ROBITUSSIN Take 200 mg by mouth three (3) times daily as needed for Cough. HumaLOG KwikPen 100 unit/mL kwikpen Generic drug:  insulin lispro 10 Units by SubCUTAneous route Before breakfast, lunch, dinner and at bedtime. ibuprofen 600 mg tablet Commonly known as:  MOTRIN Take 1 Tab by mouth every six (6) hours as needed for Pain. letrozole 2.5 mg tablet Commonly known as:  [facility] Take 1 Tab by mouth daily. LEVOTHYROXINE PO Take 0.88 mg by mouth once. losartan 50 mg tablet Commonly known as:  COZAAR  
daily. METANX 3-35-2 mg Tab tab Generic drug:  L-Mfolate-B6 Phos-Methyl-B12 Take 2.8 mg by mouth.  
  
 multivitamin tablet Commonly known as:  ONE A DAY Take 1 Tab by mouth daily. oxyCODONE-acetaminophen 5-325 mg per tablet Commonly known as:  PERCOCET  
1 or 2 tablets by mouth every 4 hours as needed  
  
 temazepam 15 mg capsule Commonly known as:  RESTORIL Take 1 tab po Q HS may, repeat in 1 hour if needed VITAMIN C 500 mg tablet Generic drug:  ascorbic acid (vitamin C) Take  by mouth. ZyrTEC 10 mg tablet Generic drug:  cetirizine Take  by mouth daily. Follow-up Instructions Return in about 18 months (around 9/7/2018). To-Do List   
 03/09/2017 8:00 AM  
  Appointment with NAPOLEON AGUILAR 2 at [address] [place] [address] ([phone]) OUTSIDE FILMS  - Any outside films related to the study being scheduled should be brought with you on the day of the exam.  If this cannot be done there may be a delay in the reading of the study. MEDICATIONS  - Patient must bring a complete list of all medications currently taking to include prescriptions, over-the-counter meds, herbals, vitamins & any dietary supplements  GENERAL INSTRUCTIONS  - On the day of your exam do not use any bath powder, deodorant or lotions on the armpit area. 08/07/2018 Imaging:  DUPLEX CAROTID BILATERAL AMB Introducing [facility] & HEALTH SERVICES! Maria De Jesus Rubio introduces HealthMedia patient portal. Now you can access parts of your medical record, email your doctor's office, and request medication refills online. 1. In your internet browser, go to [email or web address] 2. Click on the First Time User? Click Here link in the Sign In box. You will see the New Member Sign Up page. 3. Enter your HealthMedia Access Code exactly as it appears below. You will not need to use this code after youve completed the sign-up process. If you do not sign up before the expiration date, you must request a new code. · HealthMedia Access Code: [email or web address] Expires: 5/8/2017  9:32 AM 
 
4. Enter the last four digits of your Social Security Number (xxxx) and Date of Birth (mm/dd/yyyy) as indicated and click Submit. You will be taken to the next sign-up page. 5. Create a HealthMedia ID. This will be your HealthMedia login ID and cannot be changed, so think of one that is secure and easy to remember. 6. Create a HealthMedia password. You can change your password at any time. 7. Enter your Password Reset Question and Answer. This can be used at a later time if you forget your password. 8. Enter your e-mail address. You will receive e-mail notification when new information is available in [address]. 9. Click Sign Up. You can now view and download portions of your medical record. 10. Click the Download Summary menu link to download a portable copy of your medical information. If you have questions, please visit the Frequently Asked Questions section of the HealthMedia website. Remember, HealthMedia is NOT to be used for urgent needs. For medical emergencies, dial 911. Now available from your iPhone and Android! Please provide this summary of care documentation to your next provider. Your primary care clinician is listed as Sharonda Cadena.  If you have any questions after today's visit, please call [phone].

## 2017-03-09 ENCOUNTER — HOSPITAL ENCOUNTER (OUTPATIENT)
Dept: MAMMOGRAPHY | Age: 62
Discharge: HOME | End: 2017-03-09
Attending: INTERNAL MEDICINE
Payer: COMMERCIAL

## 2017-03-09 DIAGNOSIS — C50.919 MALIGNANT NEOPLASM OF FEMALE BREAST, UNSPECIFIED LATERALITY, UNSPECIFIED SITE OF BREAST: ICD-10-CM

## 2017-03-09 PROCEDURE — 77065 DX MAMMO INCL CAD UNI: CPT

## 2017-03-17 ENCOUNTER — DOCUMENTATION ONLY (OUTPATIENT)
Dept: SURGERY | Age: 62
End: 2017-03-17

## 2017-05-12 ENCOUNTER — HOSPITAL ENCOUNTER (OUTPATIENT)
Dept: LAB | Age: 62
Discharge: HOME | End: 2017-05-12
Payer: COMMERCIAL

## 2017-05-12 ENCOUNTER — HOSPITAL ENCOUNTER (OUTPATIENT)
Dept: ONCOLOGY | Age: 62
Discharge: HOME | End: 2017-05-12

## 2017-05-12 ENCOUNTER — OFFICE VISIT (OUTPATIENT)
Dept: ONCOLOGY | Age: 62
End: 2017-05-12

## 2017-05-12 VITALS
BODY MASS INDEX: 49.59 KG/M2 | HEART RATE: 120 BPM | WEIGHT: 246 LBS | SYSTOLIC BLOOD PRESSURE: 144 MMHG | TEMPERATURE: 98.6 F | DIASTOLIC BLOOD PRESSURE: 72 MMHG | HEIGHT: 59 IN

## 2017-05-12 DIAGNOSIS — M62.838 MUSCLE SPASM: ICD-10-CM

## 2017-05-12 DIAGNOSIS — I89.0 LYMPHEDEMA OF UPPER EXTREMITY FOLLOWING LYMPHADENECTOMY: ICD-10-CM

## 2017-05-12 DIAGNOSIS — C50.411 MALIGNANT NEOPLASM OF UPPER-OUTER QUADRANT OF RIGHT FEMALE BREAST (HCC): ICD-10-CM

## 2017-05-12 DIAGNOSIS — E89.89 LYMPHEDEMA OF UPPER EXTREMITY FOLLOWING LYMPHADENECTOMY: ICD-10-CM

## 2017-05-12 DIAGNOSIS — C50.919 MALIGNANT NEOPLASM OF FEMALE BREAST, UNSPECIFIED LATERALITY, UNSPECIFIED SITE OF BREAST: ICD-10-CM

## 2017-05-12 DIAGNOSIS — M19.90 ARTHRITIS: ICD-10-CM

## 2017-05-12 DIAGNOSIS — C50.411 MALIGNANT NEOPLASM OF UPPER-OUTER QUADRANT OF RIGHT FEMALE BREAST (HCC): Primary | ICD-10-CM

## 2017-05-12 LAB
ALBUMIN SERPL BCP-MCNC: 3.5 G/DL (ref 3.4–5)
ALBUMIN/GLOB SERPL: 0.8 {RATIO} (ref 0.8–1.7)
ALP SERPL-CCNC: 154 U/L (ref 45–117)
ALT SERPL-CCNC: 25 U/L (ref 13–56)
ANION GAP BLD CALC-SCNC: 9 MMOL/L (ref 3–18)
AST SERPL W P-5'-P-CCNC: 18 U/L (ref 15–37)
BASO+EOS+MONOS # BLD AUTO: 0.5 K/UL (ref 0–2.3)
BASO+EOS+MONOS # BLD AUTO: 5 % (ref 0.1–17)
BILIRUB SERPL-MCNC: 0.5 MG/DL (ref 0.2–1)
BUN SERPL-MCNC: 30 MG/DL (ref 7–18)
BUN/CREAT SERPL: 19 (ref 12–20)
CALCIUM SERPL-MCNC: 9 MG/DL (ref 8.5–10.1)
CHLORIDE SERPL-SCNC: 105 MMOL/L (ref 100–108)
CO2 SERPL-SCNC: 23 MMOL/L (ref 21–32)
CREAT SERPL-MCNC: 1.61 MG/DL (ref 0.6–1.3)
DIFFERENTIAL METHOD BLD: ABNORMAL
ERYTHROCYTE [DISTWIDTH] IN BLOOD BY AUTOMATED COUNT: 13.3 % (ref 11.5–14.5)
GLOBULIN SER CALC-MCNC: 4.2 G/DL (ref 2–4)
GLUCOSE SERPL-MCNC: 361 MG/DL (ref 74–99)
HCT VFR BLD AUTO: 40.5 % (ref 36–48)
HGB BLD-MCNC: 13.5 G/DL (ref 12–16)
LYMPHOCYTES # BLD AUTO: 19 % (ref 14–44)
LYMPHOCYTES # BLD: 1.8 K/UL (ref 1.1–5.9)
MCH RBC QN AUTO: 26 PG (ref 25–35)
MCHC RBC AUTO-ENTMCNC: 33.3 G/DL (ref 31–37)
MCV RBC AUTO: 77.9 FL (ref 78–102)
NEUTS SEG # BLD: 7.4 K/UL (ref 1.8–9.5)
NEUTS SEG NFR BLD AUTO: 76 % (ref 40–70)
PLATELET # BLD AUTO: 314 K/UL (ref 140–440)
POTASSIUM SERPL-SCNC: 4.5 MMOL/L (ref 3.5–5.5)
PROT SERPL-MCNC: 7.7 G/DL (ref 6.4–8.2)
RBC # BLD AUTO: 5.2 M/UL (ref 4.1–5.1)
SODIUM SERPL-SCNC: 137 MMOL/L (ref 136–145)
WBC # BLD AUTO: 9.7 K/UL (ref 4.5–13)

## 2017-05-12 PROCEDURE — 86300 IMMUNOASSAY TUMOR CA 15-3: CPT | Performed by: INTERNAL MEDICINE

## 2017-05-12 PROCEDURE — 36415 COLL VENOUS BLD VENIPUNCTURE: CPT | Performed by: INTERNAL MEDICINE

## 2017-05-12 PROCEDURE — 80053 COMPREHEN METABOLIC PANEL: CPT | Performed by: INTERNAL MEDICINE

## 2017-05-12 RX ORDER — OXYCODONE AND ACETAMINOPHEN 5; 325 MG/1; MG/1
TABLET ORAL
Qty: 120 TAB | Refills: 0 | Status: SHIPPED | OUTPATIENT
Start: 2017-05-12 | End: 2018-04-10 | Stop reason: SDUPTHER

## 2017-05-12 RX ORDER — CYCLOBENZAPRINE HCL 5 MG
5 TABLET ORAL
Qty: 60 TAB | Refills: 0 | Status: SHIPPED | OUTPATIENT
Start: 2017-05-12 | End: 2018-08-06

## 2017-05-12 NOTE — MR AVS SNAPSHOT
Visit Information Date & Time Provider Department Dept. Phone Encounter #  
 5/12/2017 11:45 AM Raf Saavedra [address] Office [phone] [id number] Follow-up Instructions Return in about 4 months (around 9/12/2017). Your Appointments 7/19/2017  2:30 PM  
XRAY Visit with Natasha Christian [facility]. [address] ([facility] [place]) Appt Note: J[facility] OUT OF THE OFFICE; R/S APPT LETTER MAILED 3/15 JSL -OK KV  
 [address] [address] [address] [facility] [address] [address]  
  
    
 7/19/2017  2:45 PM  
ESTABLISHED PATIENT with Aiden Yanez MD  
[facility]. [address] [facility]) Appt Note: 6 month f/u; kub prior and CMP; JSL OUT OF THE OFFICE; R/S APPT LETTER MAILED 3/15  
 [address] [address] [address] [facility] [address] [address]  
  
    
 8/18/2017 10:30 AM  
Office Visit with MD Wilda Hammond [address] [facility]) Appt Note: 3 mo fu; 3 mo fu  
 [address] [address]  
[phone]  
  
   
 [address]  
  
    
 9/20/2018 10:00 AM  
Follow Up with BRIDGET [facility] ([facility]) Appt Note: 18 month fu after study at our lab on 9/7/2018; .  
 [address] [facility]  
[phone] 2300 [facility] [address] [facility] Upcoming Health Maintenance Date Due Hepatitis C Screening 1955 FOOT EXAM Q1 5/2/1963 MICROALBUMIN Q1 5/2/1963 EYE EXAM RETINAL OR DILATED Q1 5/2/1963 Pneumococcal 19-64 Highest Risk (1 of 3 - PCV13) 5/2/1972 DTaP/Tdap/Td series (1 - Tdap) 5/2/1974 PAP AKA CERVICAL CYTOLOGY 5/2/1974 FOBT Q 1 YEAR AGE 50-75 5/2/2003 HEMOGLOBIN A1C Q6M 3/25/2011 LIPID PANEL Q1 9/25/2011 ZOSTER VACCINE AGE 60> 5/2/2013 INFLUENZA AGE 9 TO ADULT 8/1/2017 BREAST CANCER SCRN MAMMOGRAM 3/9/2019 Allergies as of 5/12/2017  Review Complete On: 5/12/2017 By: Jodi Taylor MD  
  
 Severity Noted Reaction Type Reactions Ampicillin  02/16/2012    Hives Ceftriaxone  11/16/2016    Other (comments) Patient received medication for UTI treatment and immediately developed itching and anxiousness. [place]  06/05/2015    Hives Not allergic anymore, it was only the pesticide Current Immunizations  Reviewed on 7/3/2015 No immunizations on file. Not reviewed this visit You Were Diagnosed With   
  
 Codes Comments Malignant neoplasm of upper-outer quadrant of right female breast ([facility] 75.)    -  Primary ICD-10-CM: C50.411 ICD-9-CM: 174.4 Arthritis     ICD-10-CM: M19.90 ICD-9-CM: 716.90 Lymphedema of upper extremity following lymphadenectomy     ICD-10-CM: E89.89, I89.0 ICD-9-CM: 997.99, 017.0 Malignant neoplasm of female breast, unspecified laterality, unspecified site of breast ([facility] 75.)     ICD-10-CM: C50.919 ICD-9-CM: 174.9 Muscle spasm     ICD-10-CM: G56.988 ICD-9-CM: 728.85 Vitals BP Pulse Temp Height(growth percentile) Weight(growth percentile) BMI  
 144/72 (!) 120 98.6 °F (37 °C) 4' 11\" (1.499 m) 246 lb (111.6 kg) 49.69 kg/m2 OB Status Smoking Status Hysterectomy Never Smoker BMI and BSA Data Body Mass Index Body Surface Area  
 49.69 kg/m 2 2.16 m 2 Preferred Pharmacy Pharmacy Name [facility], [address] [place] [address],[address] [place] [address] [phone] Your Updated Medication List  
  
   
This list is accurate as of: 5/12/17 12:46 PM.  Always use your most recent med list.  
  
  
  
  
 acetohydroxamic acid 250 mg Tab Commonly known as:  Black & Pink Take 1 Tab by mouth two (2) times daily (after meals).  Indications: BACTERIAL URINARY TRACT INFECTION, STRUVITE RENAL CALCULI  
 benzonatate 100 mg capsule Commonly known as:  TESSALON Take 100 mg by mouth three (3) times daily as needed for Cough. Cholecalciferol (Vitamin D3) 50,000 unit Cap Take  by mouth every seven (7) days. cyclobenzaprine 5 mg tablet Commonly known as:  FLEXERIL Take 1 Tab by mouth two (2) times daily as needed for Muscle Spasm(s). Indications: MUSCLE SPASM  
  
 guaiFENesin 100 mg/5 mL liquid Commonly known as:  ROBITUSSIN Take 200 mg by mouth three (3) times daily as needed for Cough. HumaLOG KwikPen 100 unit/mL kwikpen Generic drug:  insulin lispro 10 Units by SubCUTAneous route Before breakfast, lunch, dinner and at bedtime. ibuprofen 600 mg tablet Commonly known as:  MOTRIN Take 1 Tab by mouth every six (6) hours as needed for Pain. letrozole 2.5 mg tablet Commonly known as:  [facility] Take 1 Tab by mouth daily. LEVOTHYROXINE PO Take 0.88 mg by mouth once. losartan 50 mg tablet Commonly known as:  COZAAR  
daily. METANX 3-35-2 mg Tab tab Generic drug:  L-Mfolate-B6 Phos-Methyl-B12 Take 2.8 mg by mouth.  
  
 multivitamin tablet Commonly known as:  ONE A DAY Take 1 Tab by mouth daily. oxyCODONE-acetaminophen 5-325 mg per tablet Commonly known as:  PERCOCET  
1 or 2 tablets by mouth every 4 hours as needed  
  
 temazepam 15 mg capsule Commonly known as:  RESTORIL Take 1 tab po Q HS may, repeat in 1 hour if needed VITAMIN C 500 mg tablet Generic drug:  ascorbic acid (vitamin C) Take  by mouth. ZyrTEC 10 mg tablet Generic drug:  cetirizine Take  by mouth daily. Prescriptions Printed Refills  
 oxyCODONE-acetaminophen (PERCOCET) 5-325 mg per tablet 0 Si or 2 tablets by mouth every 4 hours as needed Class: Print Prescriptions Sent to Pharmacy Refills  
 cyclobenzaprine (FLEXERIL) 5 mg tablet 0  Sig: Take 1 Tab by mouth two (2) times daily as needed for Muscle Spasm(s). Indications: MUSCLE SPASM Class: Normal  
 [facility]enezer Sullivan, [address] [facility] Ph #: [phone] Route: Oral  
  
We Performed the Following COMPLETE CBC & AUTO DIFF WBC [31171 CPT(R)] Follow-up Instructions Return in about 4 months (around 9/12/2017). To-Do List   
 05/12/2017 Lab:  CA 27.29   
  
 05/12/2017 Lab:  CBC WITH 3 PART DIFF   
  
 05/12/2017 Lab:  METABOLIC PANEL, COMPREHENSIVE Patient Instructions Breast Self-Exam: Care Instructions Your Care Instructions A breast self-exam is when you check your breasts for lumps or changes. This regular exam helps you learn how your breasts normally look and feel. Most breast problems or changes are not because of cancer. Breast self-exam is not a substitute for a mammogram. Having regular breast exams by your doctor and regular mammograms improve your chances of finding any problems with your breasts. Some women set a time each month to do a step-by-step breast self-exam. Other women like a less formal system. They might look at their breasts as they brush their teeth, or feel their breasts once in a while in the shower. If you notice a change in your breast, tell your doctor. Follow-up care is a key part of your treatment and safety. Be sure to make and go to all appointments, and call your doctor if you are having problems. Its also a good idea to know your test results and keep a list of the medicines you take. How do you do a breast self-exam? 
· The best time to examine your breasts is usually one week after your menstrual period begins. Your breasts should not be tender then. If you do not have periods, you might do your exam on a day of the month that is easy to remember. · To examine your breasts: ¨ Remove all your clothes above the waist and lie down.  When you are lying down, your breast tissue spreads evenly over your chest wall, which makes it easier to feel all your breast tissue. ¨ Use the padsnot the fingertipsof the 3 middle fingers of your left hand to check your right breast. Move your fingers slowly in small coin-sized circles that overlap. ¨ Use three levels of pressure to feel of all your breast tissue. Use light pressure to feel the tissue close to the skin surface. Use medium pressure to feel a little deeper. Use firm pressure to feel your tissue close to your breastbone and ribs. Use each pressure level to feel your breast tissue before moving on to the next spot. ¨ Check your entire breast, moving up and down as if following a strip from the collarbone to the bra line, and from the armpit to the ribs. Repeat until you have covered the entire breast. 
¨ Repeat this procedure for your left breast, using the pads of the 3 middle fingers of your right hand. · To examine your breasts while in the shower: 
¨ Place one arm over your head and lightly soap your breast on that side. ¨ Using the pads of your fingers, gently move your hand over your breast (in the strip pattern described above), feeling carefully for any lumps or changes. ¨ Repeat for the other breast. 
· Have your doctor inspect anything you notice to see if you need further testing. Where can you learn more? Go to http://alexsandra-rachel.info/. Enter P148 in the search box to learn more about \"Breast Self-Exam: Care Instructions. \" Current as of: July 26, 2016 Content Version: 11.2 © 1980-5105 Butter Systems. Care instructions adapted under license by Shared Performance (which disclaims liability or warranty for this information). If you have questions about a medical condition or this instruction, always ask your healthcare professional. [address] [address] any warranty or liability for your use of this information. Breast Cancer: Care Instructions Your Care Instructions Breast cancer occurs when abnormal cells grow out of control in the breast. These cancer cells can spread within the breast, to nearby lymph nodes and other tissues, and to other parts of the body. Being treated for cancer can weaken your body, and you may feel very tired. Get the rest your body needs so you can feel better. Finding out that you have cancer is scary. You may feel many emotions and may need some help coping. Seek out family, friends, and counselors for support. You also can do things at home to make yourself feel better while you go through treatment. Call the [employer] - Tuto[employer] (5-794.956.7115) or visit its website at [address] [employer] for more information. Follow-up care is a key part of your treatment and safety. Be sure to make and go to all appointments, and call your doctor if you are having problems. It's also a good idea to know your test results and keep a list of the medicines you take. How can you care for yourself at home? · Take your medicines exactly as prescribed. Call your doctor if you think you are having a problem with your medicine. You may get medicine for nausea and vomiting if you have these side effects. · Follow your doctor's instructions to relieve pain. Pain from cancer and surgery can almost always be controlled. Use pain medicine when you first notice pain, before it becomes severe. · Eat healthy food. If you do not feel like eating, try to eat food that has protein and extra calories to keep up your strength and prevent weight loss. Drink liquid meal replacements for extra calories and protein. Try to eat your main meal early. · Get some physical activity every day, but do not get too tired. Keep doing the hobbies you enjoy as your energy allows. · Do not smoke. Smoking can make your cancer worse. If you need help quitting, talk to your doctor about stop-smoking programs and medicines. These can increase your chances of quitting for good. · Take steps to control your stress and workload. Learn relaxation techniques. ¨ Share your feelings. Stress and tension affect our emotions. By expressing your feelings to others, you may be able to understand and cope with them. ¨ Consider joining a support group. Talking about a problem with your spouse, a good friend, or other people with similar problems is a good way to reduce tension and stress. ¨ Express yourself through art. Try writing, crafts, dance, or art to relieve stress. Some dance, writing, or art groups may be available just for people who have cancer. ¨ Be kind to your body and mind. Getting enough sleep, eating a healthy diet, and taking time to do things you enjoy can contribute to an overall feeling of balance in your life and can help reduce stress. ¨ Get help if you need it. Discuss your concerns with your doctor or counselor. · If you are vomiting or have diarrhea: ¨ Drink plenty of fluids (enough so that your urine is light yellow or clear like water) to prevent dehydration. Choose water and other caffeine-free clear liquids. If you have kidney, heart, or liver disease and have to limit fluids, talk with your doctor before you increase the amount of fluids you drink. ¨ When you are able to eat, try clear soups, mild foods, and liquids until all symptoms are gone for 12 to 48 hours. Other good choices include dry toast, crackers, cooked cereal, and gelatin dessert, such as Jell-O. · If you have not already done so, prepare a list of advance directives. Advance directives are instructions to your doctor and family members about what kind of care you want if you become unable to speak or express yourself. When should you call for help? Call your doctor now or seek immediate medical care if: 
· You have a fever. · Any part of your breast becomes red, tender, swollen, or hot. · You have pain, redness, or swelling in the arm on the same side as your breast cancer. Watch closely for changes in your health, and be sure to contact your doctor if: 
· You have pain that is not controlled by medicine. · You have nausea or vomiting. · You are constipated or have diarrhea. Where can you learn more? Go to http://alexsandra-rachel.info/. Enter V321 in the search box to learn more about \"Breast Cancer: Care Instructions. \" Current as of: July 26, 2016 Content Version: 11.2 © 0857-1087 Lentigen. Care instructions adapted under license by Zenitum (which disclaims liability or warranty for this information). If you have questions about a medical condition or this instruction, always ask your healthcare professional. [address] any warranty or liability for your use of this information. Introducing [facility] & HEALTH SERVICES! [employer] introduces Critical Links patient portal. Now you can access parts of your medical record, email your doctor's office, and request medication refills online. 1. In your internet browser, go to [email or web address] 2. Click on the First Time User? Click Here link in the Sign In box. You will see the New Member Sign Up page. 3. Enter your Critical Links Access Code exactly as it appears below. You will not need to use this code after youve completed the sign-up process. If you do not sign up before the expiration date, you must request a new code. · Critical Links Access Code: [email or web address] Expires: 8/10/2017 12:37 PM 
 
4. Enter the last four digits of your Social Security Number (xxxx) and Date of Birth (mm/dd/yyyy) as indicated and click Submit. You will be taken to the next sign-up page. 5. Create a Critical Links ID. This will be your Critical Links login ID and cannot be changed, so think of one that is secure and easy to remember. 6. Create a Critical Links password. You can change your password at any time. 7. Enter your Password Reset Question and Answer.  This can be used at a later time if you forget your password. 8. Enter your e-mail address. You will receive e-mail notification when new information is available in [address] 9. Click Sign Up. You can now view and download portions of your medical record. 10. Click the Download Summary menu link to download a portable copy of your medical information. If you have questions, please visit the Frequently Asked Questions section of the StrataCloud website. Remember, StrataCloud is NOT to be used for urgent needs. For medical emergencies, dial 911. Now available from your iPhone and Android! Please provide this summary of care documentation to your next provider. Your primary care clinician is listed as Za Kay. If you have any questions after today's visit, please call [phone].

## 2017-05-12 NOTE — PROGRESS NOTES
Hematology/Oncology  Progress Note    Name: Lenny Lynn  Date: 2017  : 1955      Ms. Anton Rdz is a 57year old female who was seen for management of her invasive ductal adenocarcinoma right and anemia. Current therapy: Femara 2.5 mg daily and oral iron supplementation daily. Subjective:     Ms. Anton Rdz is a 47-year-old woman who is currently taking Femara 2.5 mg daily as long-term management for her invasive ductal carcinoma of the breast.  She continues to tolerate the medication well. The patient reports that she is doing her breast self-exam on a monthly basis. Additionally, she continues to take the iron supplement once daily. She reports she is currently undergoing therapy for lymphedema to the RUE and has some improvement in pain and swelling. She is waiting on her new sleeve and compression garment to come on. She also complains of frequent muscle spasms in the RUE and upper back area. She continues to use Percocet which provides some relief. She is asking for a refill at this time  She denies CP or SOB. Her appetite is reasonably well. Her mammogram in 2017 did not reveal any evidence of malignancy     Past medical history, family history, and social history are reviewed jorje PRN. d remains unchanged.   Past Medical History:   Diagnosis Date    Arthritis     Asthma     Asthma     Breast CA ([facility] [address] 2014    Right Breast    Cancer of breast ([facility] [address] 14    pt states she received the call last Tuesday    Chemotherapy convalescence or palliative care     Diabetes ([facility] [address]     Diabetes mellitus     Essential hypertension     Family history of breast cancer     GERD (gastroesophageal reflux disease)     Hypertension     Kidney stone     Morbid obesity ([address]     Nausea & vomiting     Radiation therapy complication     Renal colic     S/P breast biopsy - right breast calcifications 10/18/10     Staghorn calculus     Thyroid dysfunction     GOITER    UTI (lower urinary tract infection)      Past Surgical History:   Procedure Laterality Date    CYSTOSCOPY      HX BREAST BIOPSY  10/18/10    stereotactic - right breast calcifications    HX BREAST BIOPSY  2/14/2014    RIGHT BREAST BIOPSY performed by Dru Garrett MD at [facility] MAIN OR    HX CYST REMOVAL      HX GYN      urethra reconstruction x3    HX HYSTERECTOMY      HX LITHOTRIPSY      HX MODIFIED RADICAL MASTECTOMY  3/21/2014    RIGHT MODIFIED RADICAL MASTECTOMY SENTINAL LYMPH NODE BIOPSY performed by Dru Garrett MD at [facility] MAIN OR    HX TUBAL LIGATION      HX UROLOGICAL      4 diverticuli repaired    HX VASCULAR ACCESS  2014     Social History     Social History    Marital status: SINGLE     Spouse name: N/A    Number of children: N/A    Years of education: N/A     Occupational History    Not on file. Social History Main Topics    Smoking status: Never Smoker    Smokeless tobacco: Never Used    Alcohol use 0.5 oz/week     1 Glasses of wine per week      Comment: occasionally    Drug use: No    Sexual activity: Not on file     Other Topics Concern    Not on file     Social History Narrative     Family History   Problem Relation Age of Onset    Breast Cancer Sister 79    Diabetes Mother     Hypertension Mother     Breast Cancer Sister 39    Hypertension Other      Parent    Diabetes Other      parent    Stroke Other      parent    Hypertension Other      sibling    Diabetes Other      sibling    Osteoporosis Other      sibling     Current Outpatient Prescriptions   Medication Sig Dispense Refill    oxyCODONE-acetaminophen (PERCOCET) 5-325 mg per tablet 1 or 2 tablets by mouth every 4 hours as needed 120 Tab 0    cyclobenzaprine (FLEXERIL) 5 mg tablet Take 1 Tab by mouth two (2) times daily as needed for Muscle Spasm(s). Indications: MUSCLE SPASM 60 Tab 0    ibuprofen (MOTRIN) 600 mg tablet Take 1 Tab by mouth every six (6) hours as needed for Pain.  120 Tab 0    benzonatate (TESSALON) 100 mg capsule Take 100 mg by mouth three (3) times daily as needed for Cough.  guaiFENesin (ROBITUSSIN) 100 mg/5 mL liquid Take 200 mg by mouth three (3) times daily as needed for Cough.  acetohydroxamic acid (LITHOSTAT) 250 mg tab Take 1 Tab by mouth two (2) times daily (after meals). Indications: BACTERIAL URINARY TRACT INFECTION, STRUVITE RENAL CALCULI 60 Tab 12    letrozole (FEMARA) 2.5 mg tablet Take 1 Tab by mouth daily. 90 Tab 3    L-Mfolate-B6 Phos-Methyl-B12 (METANX) 3-35-2 mg tab Take 2.8 mg by mouth.  losartan (COZAAR) 50 mg tablet daily.  HUMALOG KWIKPEN 100 unit/mL kwikpen 10 Units by SubCUTAneous route Before breakfast, lunch, dinner and at bedtime.  temazepam (RESTORIL) 15 mg capsule Take 1 tab po Q HS may, repeat in 1 hour if needed 60 Cap 2    cetirizine (ZYRTEC) 10 mg tablet Take  by mouth daily.  multivitamin (ONE A DAY) tablet Take 1 Tab by mouth daily.  Cholecalciferol, Vitamin D3, 50,000 unit cap Take  by mouth every seven (7) days.  ascorbic acid (VITAMIN C) 500 mg tablet Take  by mouth.  LEVOTHYROXINE SODIUM (LEVOTHYROXINE PO) Take 0.88 mg by mouth once. Review of Systems  Constitutional: The patient denies acute distress or discomfort  HEENT: The patient denies recent head trauma, eye pain, blurred vision,  hearing deficit, oropharyngeal mucosal pain or lesions, and the patient denies throat pain or discomfort. Lymphatics: The patient denies palpable peripheral lymphadenopathy. Hematologic: The patient denies having bruising, bleeding, or progressive fatigue. Respiratory: Patient denies having shortness of breath, cough, sputum production, fever, or dyspnea on exertion. Cardiovascular: The patient denies having leg pain, leg swelling, heart palpitations, chest permit, chest pain, or lightheadedness. The patient denies having dyspnea on exertion. Gastrointestinal: The patient denies having nausea, emesis, or diarrhea.  The patient denies having any hematemesis or blood in the stool. Genitourinary: Patient denies having urinary urgency, frequency, or dysuria. The patient denies having blood in the urine. Psychological: The patient denies having symptoms of nervousness, anxiety, depression, or thoughts of harming himself some of this. Skin: Patient denies having skin rashes, skin, ulcerations, or unexplained itching or pruritus. Musculoskeletal: The patient has swelling in the right arm due to progressive lymphedema. She is now wearing a lymphedema sleeve. Anterior chest wall and breasts: She also complains of muscle spasms    Objective:     Visit Vitals    /72    Pulse (!) 120    Temp 98.6 °F (37 °C)    Ht 4' 11\" (1.499 m)    Wt 111.6 kg (246 lb)    BMI 49.69 kg/m2     ECOG PS=0 Pain score 4/10     Physical Exam:   Gen. Appearance: The patient is in no acute distress. Skin: There is no bruise or rash. HEENT: The exam is unremarkable. Neck: Supple without lymphadenopathy or thyromegaly. Lungs: Clear to auscultation and percussion; there are no wheezes or rhonchi. Heart: Regular rate and rhythm; there are no murmurs, gallops, or rubs. Anterior chest wall. Breasts: There is no evidence of local recurrence of disease. The axilla reveals no palpable axillary lymphadenopathy. Abdomen: Bowel sounds are present and normal.  There is no guarding, tenderness, or hepatosplenomegaly. Extremities: There is no clubbing, cyanosis, or edema. Neurologic: There are no focal neurologic deficits. Lymphatics: There is no palpable peripheral lymphadenopathy. Musculoskeletal: The patient has full range of motion at all joints. There is no evidence of joint deformity or effusions. The patient has  right arm lymphedema extending from the fingers up to the shoulder area on the right. There is  focal joint tenderness in the right axilla. Psychological/psychiatric: There is no clinical evidence of anxiety, depression, or melancholy.     Lab data:      Results for orders placed or performed during the hospital encounter of 05/12/17   CBC WITH 3 PART DIFF     Status: Abnormal   Result Value Ref Range Status    WBC 9.7 4.5 - 13.0 K/uL Final    RBC 5.20 (H) 4.10 - 5.10 M/uL Final    HGB 13.5 12.0 - 16.0 g/dL Final    HCT 40.5 36 - 48 % Final    MCV 77.9 (L) 78 - 102 FL Final    MCH 26.0 25.0 - 35.0 PG Final    MCHC 33.3 31 - 37 g/dL Final    RDW 13.3 11.5 - 14.5 % Final    PLATELET 216 149 - 321 K/uL Final    NEUTROPHILS 76 (H) 40 - 70 % Final    MIXED CELLS 5 0.1 - 17 % Final    LYMPHOCYTES 19 14 - 44 % Final    ABS. NEUTROPHILS 7.4 1.8 - 9.5 K/UL Final    ABS. MIXED CELLS 0.5 0.0 - 2.3 K/uL Final    ABS. LYMPHOCYTES 1.8 1.1 - 5.9 K/UL Final     Comment: Test performed at [place] [id number] Location. Results Reviewed by Medical Director. DF AUTOMATED   Final           Assessment:     1. Malignant neoplasm of upper-outer quadrant of right female breast ([facility] [address]    2. Arthritis    3. Lymphedema of upper extremity following lymphadenectomy    4. Malignant neoplasm of female breast, unspecified laterality, unspecified site of breast ([facility] [address]    5. Muscle spasm        Plan:   Right arm and hand lymphedema:She is currently undergoing lymphedema therapy and waiting on a new sleeve, glove, and chest garment. I advised her to to avoid lifting  more than 10 pounds using the right arm and hand. Breast cancer: I have encouraged patient to continue to her monthly breast self-examinations. At this time I will order a CA 27-29 level, and a comprehensive metabolic panel. The most recent CA 27-29 level was 30.0 units/ml at the last visit. I will renew her prescription for Percocet 5mg at this time. The most recent mammogram in Feb 2017 was negative for malignancy. Arthritis: Motrin 600 mg every 6 hours will be ordered and she was instructed to use Percocet for severe pain.     Muscle Spasms: Rx for Flexeril 5mg was ordered with instructions to take 1 TID as needed. I will have the patient return to the clinic again in 4 months. Orders Placed This Encounter    COMPLETE CBC & AUTO DIFF WBC    InHouse CBC ([employer])     Standing Status:   Future     Number of Occurrences:   1     Standing Expiration Date:       METABOLIC PANEL, COMPREHENSIVE     Standing Status:   Future     Standing Expiration Date:   2018    CA 27.29     Standing Status:   Future     Standing Expiration Date:   2018    oxyCODONE-acetaminophen (PERCOCET) 5-325 mg per tablet     Si or 2 tablets by mouth every 4 hours as needed     Dispense:  120 Tab     Refill:  0    cyclobenzaprine (FLEXERIL) 5 mg tablet     Sig: Take 1 Tab by mouth two (2) times daily as needed for Muscle Spasm(s). Indications:  MUSCLE SPASM     Dispense:  60 Tab     Refill:  0       Giovani Abid MD  2017

## 2017-05-12 NOTE — PATIENT INSTRUCTIONS
Breast Self-Exam: Care Instructions  Your Care Instructions  A breast self-exam is when you check your breasts for lumps or changes. This regular exam helps you learn how your breasts normally look and feel. Most breast problems or changes are not because of cancer. Breast self-exam is not a substitute for a mammogram. Having regular breast exams by your doctor and regular mammograms improve your chances of finding any problems with your breasts. Some women set a time each month to do a step-by-step breast self-exam. Other women like a less formal system. They might look at their breasts as they brush their teeth, or feel their breasts once in a while in the shower. If you notice a change in your breast, tell your doctor. Follow-up care is a key part of your treatment and safety. Be sure to make and go to all appointments, and call your doctor if you are having problems. Its also a good idea to know your test results and keep a list of the medicines you take. How do you do a breast self-exam?  · The best time to examine your breasts is usually one week after your menstrual period begins. Your breasts should not be tender then. If you do not have periods, you might do your exam on a day of the month that is easy to remember. · To examine your breasts:  ¨ Remove all your clothes above the waist and lie down. When you are lying down, your breast tissue spreads evenly over your chest wall, which makes it easier to feel all your breast tissue. ¨ Use the pads--not the fingertips--of the 3 middle fingers of your left hand to check your right breast. Move your fingers slowly in small coin-sized circles that overlap. ¨ Use three levels of pressure to feel of all your breast tissue. Use light pressure to feel the tissue close to the skin surface. Use medium pressure to feel a little deeper. Use firm pressure to feel your tissue close to your breastbone and ribs.  Use each pressure level to feel your breast tissue before moving on to the next spot. ¨ Check your entire breast, moving up and down as if following a strip from the collarbone to the bra line, and from the armpit to the ribs. Repeat until you have covered the entire breast.  ¨ Repeat this procedure for your left breast, using the pads of the 3 middle fingers of your right hand. · To examine your breasts while in the shower:  ¨ Place one arm over your head and lightly soap your breast on that side. ¨ Using the pads of your fingers, gently move your hand over your breast (in the strip pattern described above), feeling carefully for any lumps or changes. ¨ Repeat for the other breast.  · Have your doctor inspect anything you notice to see if you need further testing. Where can you learn more? Go to http://alexsandra-rachel.info/. Enter P148 in the search box to learn more about \"Breast Self-Exam: Care Instructions. \"  Current as of: July 26, 2016  Content Version: 11.2  © 5015-3657 [email or web address]. Care instructions adapted under license by [employer] (which disclaims liability or warranty for this information). If you have questions about a medical condition or this instruction, always ask your healthcare professional. [place] [address] any warranty or liability for your use of this information. Breast Cancer: Care Instructions  Your Care Instructions  Breast cancer occurs when abnormal cells grow out of control in the breast. These cancer cells can spread within the breast, to nearby lymph nodes and other tissues, and to other parts of the body. Being treated for cancer can weaken your body, and you may feel very tired. Get the rest your body needs so you can feel better. Finding out that you have cancer is scary. You may feel many emotions and may need some help coping. Seek out family, friends, and counselors for support.  You also can do things at home to make yourself feel better while you go through treatment. Call the [facility] (1-757.153.9482) or visit its website at [email or web address] for more information. Follow-up care is a key part of your treatment and safety. Be sure to make and go to all appointments, and call your doctor if you are having problems. It's also a good idea to know your test results and keep a list of the medicines you take. How can you care for yourself at home? · Take your medicines exactly as prescribed. Call your doctor if you think you are having a problem with your medicine. You may get medicine for nausea and vomiting if you have these side effects. · Follow your doctor's instructions to relieve pain. Pain from cancer and surgery can almost always be controlled. Use pain medicine when you first notice pain, before it becomes severe. · Eat healthy food. If you do not feel like eating, try to eat food that has protein and extra calories to keep up your strength and prevent weight loss. Drink liquid meal replacements for extra calories and protein. Try to eat your main meal early. · Get some physical activity every day, but do not get too tired. Keep doing the hobbies you enjoy as your energy allows. · Do not smoke. Smoking can make your cancer worse. If you need help quitting, talk to your doctor about stop-smoking programs and medicines. These can increase your chances of quitting for good. · Take steps to control your stress and workload. Learn relaxation techniques. ¨ Share your feelings. Stress and tension affect our emotions. By expressing your feelings to others, you may be able to understand and cope with them. ¨ Consider joining a support group. Talking about a problem with your spouse, a good friend, or other people with similar problems is a good way to reduce tension and stress. ¨ Express yourself through art. Try writing, crafts, dance, or art to relieve stress.  Some dance, writing, or art groups may be available just for people who have cancer. ¨ Be kind to your body and mind. Getting enough sleep, eating a healthy diet, and taking time to do things you enjoy can contribute to an overall feeling of balance in your life and can help reduce stress. ¨ Get help if you need it. Discuss your concerns with your doctor or counselor. · If you are vomiting or have diarrhea:  ¨ Drink plenty of fluids (enough so that your urine is light yellow or clear like water) to prevent dehydration. Choose water and other caffeine-free clear liquids. If you have kidney, heart, or liver disease and have to limit fluids, talk with your doctor before you increase the amount of fluids you drink. ¨ When you are able to eat, try clear soups, mild foods, and liquids until all symptoms are gone for 12 to 48 hours. Other good choices include dry toast, crackers, cooked cereal, and gelatin dessert, such as Jell-O.  · If you have not already done so, prepare a list of advance directives. Advance directives are instructions to your doctor and family members about what kind of care you want if you become unable to speak or express yourself. When should you call for help? Call your doctor now or seek immediate medical care if:  · You have a fever. · Any part of your breast becomes red, tender, swollen, or hot. · You have pain, redness, or swelling in the arm on the same side as your breast cancer. Watch closely for changes in your health, and be sure to contact your doctor if:  · You have pain that is not controlled by medicine. · You have nausea or vomiting. · You are constipated or have diarrhea. Where can you learn more? Go to http://alexsandra-rachel.info/. Enter V321 in the search box to learn more about \"Breast Cancer: Care Instructions. \"  Current as of: July 26, 2016  Content Version: 11.2  © 7895-3326 [email or web address], [employer].  Care instructions adapted under license by [employer] (which disclaims liability or warranty for this information). If you have questions about a medical condition or this instruction, always ask your healthcare professional. [place] [address] any warranty or liability for your use of this information.

## 2017-05-13 LAB — CANCER AG27-29 SERPL-ACNC: 35 U/ML (ref 0–38.6)

## 2017-08-18 ENCOUNTER — OFFICE VISIT (OUTPATIENT)
Dept: ONCOLOGY | Age: 62
End: 2017-08-18

## 2017-08-18 ENCOUNTER — HOSPITAL ENCOUNTER (OUTPATIENT)
Dept: ONCOLOGY | Age: 62
Discharge: HOME | End: 2017-08-18

## 2017-08-18 VITALS
TEMPERATURE: 99.1 F | BODY MASS INDEX: 48.47 KG/M2 | WEIGHT: 240 LBS | DIASTOLIC BLOOD PRESSURE: 72 MMHG | HEART RATE: 97 BPM | SYSTOLIC BLOOD PRESSURE: 135 MMHG

## 2017-08-18 DIAGNOSIS — M19.90 ARTHRITIS: ICD-10-CM

## 2017-08-18 DIAGNOSIS — C50.411 MALIGNANT NEOPLASM OF UPPER-OUTER QUADRANT OF RIGHT FEMALE BREAST (HCC): Primary | ICD-10-CM

## 2017-08-18 DIAGNOSIS — M62.838 MUSCLE SPASM: ICD-10-CM

## 2017-08-18 DIAGNOSIS — E89.89 LYMPHEDEMA OF UPPER EXTREMITY FOLLOWING LYMPHADENECTOMY: ICD-10-CM

## 2017-08-18 DIAGNOSIS — C50.411 MALIGNANT NEOPLASM OF UPPER-OUTER QUADRANT OF RIGHT FEMALE BREAST (HCC): ICD-10-CM

## 2017-08-18 DIAGNOSIS — I89.0 LYMPHEDEMA OF UPPER EXTREMITY FOLLOWING LYMPHADENECTOMY: ICD-10-CM

## 2017-08-18 LAB
BASO+EOS+MONOS # BLD AUTO: 0.5 K/UL (ref 0–2.3)
BASO+EOS+MONOS # BLD AUTO: 7 % (ref 0.1–17)
DIFFERENTIAL METHOD BLD: ABNORMAL
ERYTHROCYTE [DISTWIDTH] IN BLOOD BY AUTOMATED COUNT: 13 % (ref 11.5–14.5)
HCT VFR BLD AUTO: 40 % (ref 36–48)
HGB BLD-MCNC: 13.1 G/DL (ref 12–16)
LYMPHOCYTES # BLD: 1.8 K/UL (ref 1.1–5.9)
LYMPHOCYTES NFR BLD: 22 % (ref 14–44)
MCH RBC QN AUTO: 25.8 PG (ref 25–35)
MCHC RBC AUTO-ENTMCNC: 32.8 G/DL (ref 31–37)
MCV RBC AUTO: 78.7 FL (ref 78–102)
NEUTS SEG # BLD: 5.8 K/UL (ref 1.8–9.5)
NEUTS SEG NFR BLD: 72 % (ref 40–70)
PLATELET # BLD AUTO: 303 K/UL (ref 140–440)
RBC # BLD AUTO: 5.08 M/UL (ref 4.1–5.1)
WBC # BLD AUTO: 8.1 K/UL (ref 4.5–13)

## 2017-08-18 NOTE — PATIENT INSTRUCTIONS
Breast Cancer: Care Instructions  Your Care Instructions  Breast cancer occurs when abnormal cells grow out of control in the breast. These cancer cells can spread within the breast, to nearby lymph nodes and other tissues, and to other parts of the body. Being treated for cancer can weaken your body, and you may feel very tired. Get the rest your body needs so you can feel better. Finding out that you have cancer is scary. You may feel many emotions and may need some help coping. Seek out family, friends, and counselors for support. You also can do things at home to make yourself feel better while you go through treatment. Call the [facility] (4-152.144.7276) or visit its website at [employer] for more information. Follow-up care is a key part of your treatment and safety. Be sure to make and go to all appointments, and call your doctor if you are having problems. It's also a good idea to know your test results and keep a list of the medicines you take. How can you care for yourself at home? · Take your medicines exactly as prescribed. Call your doctor if you think you are having a problem with your medicine. You may get medicine for nausea and vomiting if you have these side effects. · Follow your doctor's instructions to relieve pain. Pain from cancer and surgery can almost always be controlled. Use pain medicine when you first notice pain, before it becomes severe. · Eat healthy food. If you do not feel like eating, try to eat food that has protein and extra calories to keep up your strength and prevent weight loss. Drink liquid meal replacements for extra calories and protein. Try to eat your main meal early. · Get some physical activity every day, but do not get too tired. Keep doing the hobbies you enjoy as your energy allows. · Do not smoke. Smoking can make your cancer worse. If you need help quitting, talk to your doctor about stop-smoking programs and medicines.  These can increase your chances of quitting for good. · Take steps to control your stress and workload. Learn relaxation techniques. ¨ Share your feelings. Stress and tension affect our emotions. By expressing your feelings to others, you may be able to understand and cope with them. ¨ Consider joining a support group. Talking about a problem with your spouse, a good friend, or other people with similar problems is a good way to reduce tension and stress. ¨ Express yourself through art. Try writing, crafts, dance, or art to relieve stress. Some dance, writing, or art groups may be available just for people who have cancer. ¨ Be kind to your body and mind. Getting enough sleep, eating a healthy diet, and taking time to do things you enjoy can contribute to an overall feeling of balance in your life and can help reduce stress. ¨ Get help if you need it. Discuss your concerns with your doctor or counselor. · If you are vomiting or have diarrhea:  ¨ Drink plenty of fluids (enough so that your urine is light yellow or clear like water) to prevent dehydration. Choose water and other caffeine-free clear liquids. If you have kidney, heart, or liver disease and have to limit fluids, talk with your doctor before you increase the amount of fluids you drink. ¨ When you are able to eat, try clear soups, mild foods, and liquids until all symptoms are gone for 12 to 48 hours. Other good choices include dry toast, crackers, cooked cereal, and gelatin dessert, such as Jell-O.  · If you have not already done so, prepare a list of advance directives. Advance directives are instructions to your doctor and family members about what kind of care you want if you become unable to speak or express yourself. When should you call for help? Call your doctor now or seek immediate medical care if:  · You have a fever. · Any part of your breast becomes red, tender, swollen, or hot.   · You have pain, redness, or swelling in the arm on the same side as your breast cancer. Watch closely for changes in your health, and be sure to contact your doctor if:  · You have pain that is not controlled by medicine. · You have nausea or vomiting. · You are constipated or have diarrhea. Where can you learn more? Go to http://alexsandra-rachel.info/. Enter V321 in the search box to learn more about \"Breast Cancer: Care Instructions. \"  Current as of: July 26, 2016  Content Version: 11.3  © 2499-5675 [employer]. Care instructions adapted under license by [employer] (which disclaims liability or warranty for this information). If you have questions about a medical condition or this instruction, always ask your healthcare professional. [address] any warranty or liability for your use of this information.

## 2017-08-18 NOTE — PROGRESS NOTES
Hematology/Oncology  Progress Note    Name: Pamela Green  Date: 2017  : 1955      Ms. Cayla Glaser is a 57year old female who was seen for management of her invasive ductal adenocarcinoma right and anemia. Current therapy: Femara 2.5 mg daily and oral iron supplementation daily. Subjective:     Ms. Cayla Glaser is a 47-year-old woman who is currently taking Femara 2.5 mg daily as long-term management for her invasive ductal carcinoma of the breast.  She continues to tolerate the medication well. The patient reports that she is doing her breast self-exam on a monthly basis. Additionally, she continues to take the iron supplement once daily. She reports she is currently undergoing therapy for lymphedema to the [facility] and has some improvement in pain and swelling. She has a new sleeve and compression garment to come on. She has no complaints of pain at this time. She denies CP or SOB. Her appetite is reasonably well. Her mammogram in 2017 did not reveal any evidence of malignancy     Past medical history, family history, and social history are reviewed jorje PRN. d remains unchanged. Past Medical History:   Diagnosis Date    Arthritis     Arthropathy, unspecified, site unspecified     Asthma     Mild.  Breast CA ([facility] [address] [facility] 2014    Right Breast    Cancer of breast ([facility] [address] [facility] 14    pt states she received the call last Tuesday    Chemotherapy convalescence or palliative care     Diabetes ([facility]     Diabetes mellitus     Essential hypertension     Family history of breast cancer     GERD (gastroesophageal reflux disease)     Hematuria     Hiatus hernia syndrome     Hypertension     Kidney stone     Morbid obesity (HCC)     Nausea & vomiting     Nausea with vomiting     Postop N/V    Radiation therapy complication     Recurrent UTI     Renal colic     S/P breast biopsy - right breast calcifications 10/18/10     Shortness of breath     With exertion.      Staghorn calculus     Struvite kidney stones     Thyroid dysfunction     GOITER    Urethral diverticulum     Urine leukocytes     UTI (lower urinary tract infection)      Past Surgical History:   Procedure Laterality Date    CYSTOSCOPY      HX BREAST BIOPSY  10/18/10    stereotactic - right breast calcifications    HX BREAST BIOPSY  2/14/2014    RIGHT BREAST BIOPSY performed by Juan Reyez MD at [facility] MAIN OR    HX CYST REMOVAL  1997    Bilateral breast.     HX GYN      urethra reconstruction x3    HX HYSTERECTOMY      HX LITHOTRIPSY      HX MODIFIED RADICAL MASTECTOMY  3/21/2014    RIGHT MODIFIED RADICAL MASTECTOMY SENTINAL LYMPH NODE BIOPSY performed by Juan Reyez MD at [facility] MAIN OR    HX TUBAL LIGATION      HX UROLOGICAL      4 diverticuli repaired    HX VASCULAR ACCESS  2014     Social History     Social History    Marital status: SINGLE     Spouse name: N/A    Number of children: N/A    Years of education: N/A     Occupational History    Not on file. Social History Main Topics    Smoking status: Never Smoker    Smokeless tobacco: Never Used    Alcohol use 0.5 oz/week     1 Glasses of wine per week      Comment: occasionally    Drug use: No    Sexual activity: Not on file     Other Topics Concern    Not on file     Social History Narrative     Family History   Problem Relation Age of Onset    Breast Cancer Sister 79    Diabetes Mother     Hypertension Mother     Breast Cancer Sister 39    Hypertension Other      Parent    Diabetes Other      parent    Stroke Other      parent    Hypertension Other      sibling    Diabetes Other      sibling    Osteoporosis Other      sibling     Current Outpatient Prescriptions   Medication Sig Dispense Refill    oxyCODONE-acetaminophen (PERCOCET) 5-325 mg per tablet 1 or 2 tablets by mouth every 4 hours as needed 120 Tab 0    cyclobenzaprine (FLEXERIL) 5 mg tablet Take 1 Tab by mouth two (2) times daily as needed for Muscle Spasm(s). Indications:  MUSCLE SPASM 60 Tab 0    ibuprofen (MOTRIN) 600 mg tablet Take 1 Tab by mouth every six (6) hours as needed for Pain. 120 Tab 0    benzonatate (TESSALON) 100 mg capsule Take 100 mg by mouth three (3) times daily as needed for Cough.  guaiFENesin (ROBITUSSIN) 100 mg/5 mL liquid Take 200 mg by mouth three (3) times daily as needed for Cough.  acetohydroxamic acid (LITHOSTAT) 250 mg tab Take 1 Tab by mouth two (2) times daily (after meals). Indications: BACTERIAL URINARY TRACT INFECTION, STRUVITE RENAL CALCULI 60 Tab 12    letrozole (FEMARA) 2.5 mg tablet Take 1 Tab by mouth daily. 90 Tab 3    L-Mfolate-B6 Phos-Methyl-B12 (METANX) 3-35-2 mg tab Take 2.8 mg by mouth.  losartan (COZAAR) 50 mg tablet daily.  HUMALOG KWIKPEN 100 unit/mL kwikpen 10 Units by SubCUTAneous route Before breakfast, lunch, dinner and at bedtime.  temazepam (RESTORIL) 15 mg capsule Take 1 tab po Q HS may, repeat in 1 hour if needed 60 Cap 2    cetirizine (ZYRTEC) 10 mg tablet Take  by mouth daily.  multivitamin (ONE A DAY) tablet Take 1 Tab by mouth daily.  Cholecalciferol, Vitamin D3, 50,000 unit cap Take  by mouth every seven (7) days.  ascorbic acid (VITAMIN C) 500 mg tablet Take  by mouth.  LEVOTHYROXINE SODIUM (LEVOTHYROXINE PO) Take 0.88 mg by mouth once. Review of Systems  Constitutional: The patient denies acute distress or discomfort  HEENT: The patient denies recent head trauma, eye pain, blurred vision,  hearing deficit, oropharyngeal mucosal pain or lesions, and the patient denies throat pain or discomfort. Lymphatics: The patient denies palpable peripheral lymphadenopathy. Hematologic: The patient denies having bruising, bleeding, or progressive fatigue. Respiratory: Patient denies having shortness of breath, cough, sputum production, fever, or dyspnea on exertion. Cardiovascular:  The patient denies having leg pain, leg swelling, heart palpitations, chest permit, chest pain, or lightheadedness. The patient denies having dyspnea on exertion. Gastrointestinal: The patient denies having nausea, emesis, or diarrhea. The patient denies having any hematemesis or blood in the stool. Genitourinary: Patient denies having urinary urgency, frequency, or dysuria. The patient denies having blood in the urine. Psychological: The patient denies having symptoms of nervousness, anxiety, depression, or thoughts of harming himself some of this. Skin: Patient denies having skin rashes, skin, ulcerations, or unexplained itching or pruritus. Musculoskeletal: The patient has swelling in the right arm due to progressive lymphedema. She is now wearing a lymphedema sleeve. Anterior chest wall and breasts: She denies muscle or joint aches or pains    Objective: There were no vitals taken for this visit. ECOG PS=0 Pain score 0/10     Physical Exam:   Gen. Appearance: The patient is in no acute distress. Skin: There is no bruise or rash. HEENT: The exam is unremarkable. Neck: Supple without lymphadenopathy or thyromegaly. Lungs: Clear to auscultation and percussion; there are no wheezes or rhonchi. Heart: Regular rate and rhythm; there are no murmurs, gallops, or rubs. Anterior chest wall. Breasts: There is no evidence of local recurrence of disease. The axilla reveals no palpable axillary lymphadenopathy. Abdomen: Bowel sounds are present and normal.  There is no guarding, tenderness, or hepatosplenomegaly. Extremities: There is no clubbing, cyanosis. 2+ edema to RUE, patient is wearing a compression sleeve. Neurologic: There are no focal neurologic deficits. Lymphatics: There is no palpable peripheral lymphadenopathy. Musculoskeletal: The patient has full range of motion at all joints. There is no evidence of joint deformity or effusions. The patient has  right arm lymphedema extending from the fingers up to the shoulder area on the right.  Psychological/psychiatric: There is no clinical evidence of anxiety, depression, or melancholy. Lab data:      Results for orders placed or performed during the hospital encounter of 05/12/17   CBC WITH 3 PART DIFF     Status: Abnormal   Result Value Ref Range Status    WBC 9.7 4.5 - 13.0 K/uL Final    RBC 5.20 (H) 4.10 - 5.10 M/uL Final    HGB 13.5 12.0 - 16.0 g/dL Final    HCT 40.5 36 - 48 % Final    MCV 77.9 (L) 78 - 102 FL Final    MCH 26.0 25.0 - 35.0 PG Final    MCHC 33.3 31 - 37 g/dL Final    RDW 13.3 11.5 - 14.5 % Final    PLATELET 485 710 - 473 K/uL Final    NEUTROPHILS 76 (H) 40 - 70 % Final    MIXED CELLS 5 0.1 - 17 % Final    LYMPHOCYTES 19 14 - 44 % Final    ABS. NEUTROPHILS 7.4 1.8 - 9.5 K/UL Final    ABS. MIXED CELLS 0.5 0.0 - 2.3 K/uL Final    ABS. LYMPHOCYTES 1.8 1.1 - 5.9 K/UL Final     Comment: Test performed at [place] [address] Location. Results Reviewed by Medical Director. DF AUTOMATED   Final           Assessment:     1. Malignant neoplasm of upper-outer quadrant of right female breast ([facility] [address]    2. Lymphedema of upper extremity following lymphadenectomy    3. Arthritis    4. Muscle spasm        Plan:   Right arm and hand lymphedema:She is currently undergoing lymphedema therapy and continues to wear her new sleeve, glove, and chest garment. I advised her to to avoid lifting  more than 10 pounds using the right arm and hand. Breast cancer: I have encouraged patient to continue to her monthly breast self-examinations. At this time I will order a CA 27-29 level, and a comprehensive metabolic panel. The most recent CA 27-29 level was 35.0 units/ml at the last visit. The most recent mammogram in Feb 2017 was negative for malignancy. Arthritis: Motrin 600 mg every 6 hours will be ordered and she was instructed to use Percocet for severe pain. Muscle Spasms: Rx for Flexeril 5mg was ordered with instructions to take 1 TID as needed. I will have the patient return to the clinic again in 4 months.   Orders Placed This Encounter    COMPLETE CBC & AUTO DIFF WBC    InHouse CBC ([employer])     Standing Status:   Future     Number of Occurrences:   1     Standing Expiration Date:   8/25/2017       Star Alonso MD  8/18/2017

## 2017-08-18 NOTE — MR AVS SNAPSHOT
Visit Information Date & Time Provider Department Dept. Phone Encounter #  
 8/18/2017 10:30 AM Isiah Chaney MD [facility] Office [phone] [id number] Follow-up Instructions Return in about 3 months (around 11/18/2017). Your Appointments 11/17/2017  3:15 PM  
Office Visit with Isiah Chaney MD  
[address] ([facility]) Appt Note: OV  
 [facility] [address] [address]  
[phone]  
  
   
 [place] [address] [facility] [address]  
  
    
 2/5/2018  9:30 AM  
XRAY Visit with Monika Samson [facility]. [address] ([facility]) Appt Note: Return in about 6 months (around 1/19/2018) for KUB  
 [address] [facility] [place] [address] [id number]  
  
    
 2/5/2018  9:45 AM  
ESTABLISHED PATIENT with Esperanza Guerra MD  
[facility]. [address] [facility]) Appt Note: Return in about 6 months (around 1/19/2018) for KUB  
 [address] [facility] [place] [address]  
  
    
 9/20/2018 10:00 AM  
Follow Up with [facility] ([facility]) Appt Note: 18 month fu after study at our lab on 9/7/2018; .  
 [place] [address] [address] [facility] [address]  
[phone] [address] [facility] [address] [facility] [facility] [address] Upcoming Health Maintenance Date Due Hepatitis C Screening 1955 FOOT EXAM Q1 5/2/1963 MICROALBUMIN Q1 5/2/1963 EYE EXAM RETINAL OR DILATED Q1 5/2/1963 Pneumococcal 19-64 Highest Risk (1 of 3 - PCV13) 5/2/1972 DTaP/Tdap/Td series (1 - Tdap) 5/2/1974 PAP AKA CERVICAL CYTOLOGY 5/2/1974 FOBT Q 1 YEAR AGE 50-75 5/2/2003 HEMOGLOBIN A1C Q6M 3/25/2011 LIPID PANEL Q1 9/25/2011 ZOSTER VACCINE AGE 60> 3/2/2013 INFLUENZA AGE 9 TO ADULT 8/1/2017 BREAST CANCER SCRN MAMMOGRAM 3/9/2019 Allergies as of 8/18/2017  Review Complete On: 8/18/2017 By: Ariadna Guzman MD  
  
 Severity Noted Reaction Type Reactions Ampicillin  02/16/2012    Hives Ceftriaxone  11/16/2016    Other (comments) Patient received medication for UTI treatment and immediately developed itching and anxiousness. [place]  06/05/2015    Hives Not allergic anymore, it was only the pesticide Current Immunizations  Reviewed on 7/3/2015 No immunizations on file. Not reviewed this visit You Were Diagnosed With   
  
 Codes Comments Malignant neoplasm of upper-outer quadrant of right female breast ([facility]    -  Primary ICD-10-CM: C50.411 ICD-9-CM: 174.4 Lymphedema of upper extremity following lymphadenectomy     ICD-10-CM: E89.89, I89.0 ICD-9-CM: 997.99, 961.2 Arthritis     ICD-10-CM: M19.90 ICD-9-CM: 716.90 Muscle spasm     ICD-10-CM: L85.000 ICD-9-CM: 728.85 Vitals BP Pulse Temp Weight(growth percentile) BMI OB Status 135/72 (BP 1 Location: Left arm, BP Patient Position: Sitting) 97 99.1 °F (37.3 °C) (Oral) 240 lb (108.9 kg) 48.47 kg/m2 Hysterectomy Smoking Status Never Smoker BMI and BSA Data Body Mass Index Body Surface Area  
 48.47 kg/m 2 2.13 m 2 Preferred Pharmacy Pharmacy Name [facility] [address] [place] [address] [phone] Your Updated Medication List  
  
   
This list is accurate as of: 8/18/17 11:14 AM.  Always use your most recent med list.  
  
  
  
  
 acetohydroxamic acid 250 mg Tab Commonly known as:  Black & Pink Take 1 Tab by mouth two (2) times daily (after meals). Indications: BACTERIAL URINARY TRACT INFECTION, STRUVITE RENAL CALCULI  
  
 benzonatate 100 mg capsule Commonly known as:  TESSALON Take 100 mg by mouth three (3) times daily as needed for Cough. Cholecalciferol (Vitamin D3) 50,000 unit Cap Take  by mouth every seven (7) days. cyclobenzaprine 5 mg tablet Commonly known as:  FLEXERIL Take 1 Tab by mouth two (2) times daily as needed for Muscle Spasm(s). Indications: MUSCLE SPASM  
  
 guaiFENesin 100 mg/5 mL liquid Commonly known as:  ROBITUSSIN Take 200 mg by mouth three (3) times daily as needed for Cough. HumaLOG KwikPen 100 unit/mL kwikpen Generic drug:  insulin lispro 10 Units by SubCUTAneous route Before breakfast, lunch, dinner and at bedtime. ibuprofen 600 mg tablet Commonly known as:  MOTRIN Take 1 Tab by mouth every six (6) hours as needed for Pain. letrozole 2.5 mg tablet Commonly known as:  [facility] Take 1 Tab by mouth daily. LEVOTHYROXINE PO Take 0.88 mg by mouth once. losartan 50 mg tablet Commonly known as:  COZAAR  
daily. METANX 3-35-2 mg Tab tab Generic drug:  L-Mfolate-B6 Phos-Methyl-B12 Take 2.8 mg by mouth.  
  
 multivitamin tablet Commonly known as:  ONE A DAY Take 1 Tab by mouth daily. oxyCODONE-acetaminophen 5-325 mg per tablet Commonly known as:  PERCOCET  
1 or 2 tablets by mouth every 4 hours as needed  
  
 temazepam 15 mg capsule Commonly known as:  RESTORIL Take 1 tab po Q HS may, repeat in 1 hour if needed VITAMIN C 500 mg tablet Generic drug:  ascorbic acid (vitamin C) Take  by mouth. ZyrTEC 10 mg tablet Generic drug:  cetirizine Take  by mouth daily. We Performed the Following COMPLETE CBC & AUTO DIFF WBC [67714 CPT(R)] Follow-up Instructions Return in about 3 months (around 11/18/2017). To-Do List   
 08/18/2017 Lab:  CBC WITH 3 PART DIFF   
  
 08/19/2017 Lab:  CA 27.29   
  
 08/19/2017 Lab:  METABOLIC PANEL, COMPREHENSIVE Patient Instructions Breast Cancer: Care Instructions Your Care Instructions Breast cancer occurs when abnormal cells grow out of control in the breast. These cancer cells can spread within the breast, to nearby lymph nodes and other tissues, and to other parts of the body. Being treated for cancer can weaken your body, and you may feel very tired. Get the rest your body needs so you can feel better. Finding out that you have cancer is scary. You may feel many emotions and may need some help coping. Seek out family, friends, and counselors for support. You also can do things at home to make yourself feel better while you go through treatment. Call the [employer] (7-530.155.8721) or visit its website at [employer] [employer] O[employer]ET for more information. Follow-up care is a key part of your treatment and safety. Be sure to make and go to all appointments, and call your doctor if you are having problems. It's also a good idea to know your test results and keep a list of the medicines you take. How can you care for yourself at home? · Take your medicines exactly as prescribed. Call your doctor if you think you are having a problem with your medicine. You may get medicine for nausea and vomiting if you have these side effects. · Follow your doctor's instructions to relieve pain. Pain from cancer and surgery can almost always be controlled. Use pain medicine when you first notice pain, before it becomes severe. · Eat healthy food. If you do not feel like eating, try to eat food that has protein and extra calories to keep up your strength and prevent weight loss. Drink liquid meal replacements for extra calories and protein. Try to eat your main meal early. · Get some physical activity every day, but do not get too tired. Keep doing the hobbies you enjoy as your energy allows. · Do not smoke. Smoking can make your cancer worse. If you need help quitting, talk to your doctor about stop-smoking programs and medicines. These can increase your chances of quitting for good. · Take steps to control your stress and workload. Learn relaxation techniques. ¨ Share your feelings. Stress and tension affect our emotions. By expressing your feelings to others, you may be able to understand and cope with them. ¨ Consider joining a support group. Talking about a problem with your spouse, a good friend, or other people with similar problems is a good way to reduce tension and stress. ¨ Express yourself through art. Try writing, crafts, dance, or art to relieve stress. Some dance, writing, or art groups may be available just for people who have cancer. ¨ Be kind to your body and mind. Getting enough sleep, eating a healthy diet, and taking time to do things you enjoy can contribute to an overall feeling of balance in your life and can help reduce stress. ¨ Get help if you need it. Discuss your concerns with your doctor or counselor. · If you are vomiting or have diarrhea: ¨ Drink plenty of fluids (enough so that your urine is light yellow or clear like water) to prevent dehydration. Choose water and other caffeine-free clear liquids. If you have kidney, heart, or liver disease and have to limit fluids, talk with your doctor before you increase the amount of fluids you drink. ¨ When you are able to eat, try clear soups, mild foods, and liquids until all symptoms are gone for 12 to 48 hours. Other good choices include dry toast, crackers, cooked cereal, and gelatin dessert, such as Jell-O. · If you have not already done so, prepare a list of advance directives. Advance directives are instructions to your doctor and family members about what kind of care you want if you become unable to speak or express yourself. When should you call for help? Call your doctor now or seek immediate medical care if: 
· You have a fever. · Any part of your breast becomes red, tender, swollen, or hot. · You have pain, redness, or swelling in the arm on the same side as your breast cancer. Watch closely for changes in your health, and be sure to contact your doctor if: · You have pain that is not controlled by medicine. · You have nausea or vomiting. · You are constipated or have diarrhea. Where can you learn more? Go to http://alexsandra-rachel.info/. Enter V321 in the search box to learn more about \"Breast Cancer: Care Instructions. \" Current as of: July 26, 2016 Content Version: 11.3 © 3420-0488 [employer]. Care instructions adapted under license by [employer] (which disclaims liability or warranty for this information). If you have questions about a medical condition or this instruction, always ask your healthcare professional. [place] [address] any warranty or liability for your use of this information. Introducing [facility]! James Carlson introduces [employer] patient portal. Now you can access parts of your medical record, email your doctor's office, and request medication refills online. 1. In your internet browser, go to [email or web address] 2. Click on the First Time User? Click Here link in the Sign In box. You will see the New Member Sign Up page. 3. Enter your [employer] Access Code exactly as it appears below. You will not need to use this code after youve completed the sign-up process. If you do not sign up before the expiration date, you must request a new code. · [employer] Access Code: [email or web address] Expires: 11/16/2017 11:12 AM 
 
4. Enter the last four digits of your Social Security Number (xxxx) and Date of Birth (mm/dd/yyyy) as indicated and click Submit. You will be taken to the next sign-up page. 5. Create a [employer] ID. This will be your [employer] login ID and cannot be changed, so think of one that is secure and easy to remember. 6. Create a [employer] password. You can change your password at any time. 7. Enter your Password Reset Question and Answer. This can be used at a later time if you forget your password. 8. Enter your e-mail address. You will receive e-mail notification when new information is available in [address] 9. Click Sign Up. You can now view and download portions of your medical record. 10. Click the Download Summary menu link to download a portable copy of your medical information. If you have questions, please visit the Frequently Asked Questions section of the [employer] website. Remember, [employer] is NOT to be used for urgent needs. For medical emergencies, dial 911. Now available from your iPhone and Android! Please provide this summary of care documentation to your next provider. Your primary care clinician is listed as Dusty Vergara. If you have any questions after today's visit, please call [phone].

## 2017-08-19 LAB
ALBUMIN SERPL-MCNC: 4 G/DL (ref 3.6–4.8)
ALBUMIN/GLOB SERPL: 1.2 {RATIO} (ref 1.2–2.2)
ALP SERPL-CCNC: 134 IU/L (ref 39–117)
ALT SERPL-CCNC: 18 IU/L (ref 0–32)
AST SERPL-CCNC: 18 IU/L (ref 0–40)
BILIRUB SERPL-MCNC: 0.3 MG/DL (ref 0–1.2)
BUN SERPL-MCNC: 18 MG/DL (ref 8–27)
BUN/CREAT SERPL: 13 (ref 12–28)
CALCIUM SERPL-MCNC: 9.6 MG/DL (ref 8.7–10.3)
CANCER AG27-29 SERPL-ACNC: 35.8 U/ML (ref 0–38.6)
CHLORIDE SERPL-SCNC: 100 MMOL/L (ref 96–106)
CO2 SERPL-SCNC: 18 MMOL/L (ref 18–29)
CREAT SERPL-MCNC: 1.39 MG/DL (ref 0.57–1)
GLOBULIN SER CALC-MCNC: 3.4 G/DL (ref 1.5–4.5)
GLUCOSE SERPL-MCNC: 289 MG/DL (ref 65–99)
POTASSIUM SERPL-SCNC: 5 MMOL/L (ref 3.5–5.2)
PROT SERPL-MCNC: 7.4 G/DL (ref 6–8.5)
SODIUM SERPL-SCNC: 139 MMOL/L (ref 134–144)

## 2017-11-17 ENCOUNTER — HOSPITAL ENCOUNTER (OUTPATIENT)
Dept: ONCOLOGY | Age: 62
Discharge: HOME | End: 2017-11-17

## 2017-11-17 ENCOUNTER — OFFICE VISIT (OUTPATIENT)
Dept: ONCOLOGY | Age: 62
End: 2017-11-17

## 2017-11-17 VITALS
HEART RATE: 103 BPM | BODY MASS INDEX: 49.61 KG/M2 | WEIGHT: 245.6 LBS | TEMPERATURE: 99 F | DIASTOLIC BLOOD PRESSURE: 72 MMHG | SYSTOLIC BLOOD PRESSURE: 148 MMHG

## 2017-11-17 DIAGNOSIS — I89.0 LYMPHEDEMA OF UPPER EXTREMITY FOLLOWING LYMPHADENECTOMY: ICD-10-CM

## 2017-11-17 DIAGNOSIS — M19.90 ARTHRITIS: ICD-10-CM

## 2017-11-17 DIAGNOSIS — C50.411 MALIGNANT NEOPLASM OF UPPER-OUTER QUADRANT OF RIGHT FEMALE BREAST, UNSPECIFIED ESTROGEN RECEPTOR STATUS (HCC): Primary | ICD-10-CM

## 2017-11-17 DIAGNOSIS — C50.411 MALIGNANT NEOPLASM OF UPPER-OUTER QUADRANT OF RIGHT FEMALE BREAST, UNSPECIFIED ESTROGEN RECEPTOR STATUS (HCC): ICD-10-CM

## 2017-11-17 DIAGNOSIS — E89.89 LYMPHEDEMA OF UPPER EXTREMITY FOLLOWING LYMPHADENECTOMY: ICD-10-CM

## 2017-11-17 DIAGNOSIS — M62.838 MUSCLE SPASM: ICD-10-CM

## 2017-11-17 LAB
BASO+EOS+MONOS # BLD AUTO: 0.7 K/UL (ref 0–2.3)
BASO+EOS+MONOS # BLD AUTO: 8 % (ref 0.1–17)
DIFFERENTIAL METHOD BLD: NORMAL
ERYTHROCYTE [DISTWIDTH] IN BLOOD BY AUTOMATED COUNT: 13.2 % (ref 11.5–14.5)
HCT VFR BLD AUTO: 37 % (ref 36–48)
HGB BLD-MCNC: 12 G/DL (ref 12–16)
LYMPHOCYTES # BLD: 2.6 K/UL (ref 1.1–5.9)
LYMPHOCYTES NFR BLD: 27 % (ref 14–44)
MCH RBC QN AUTO: 25.4 PG (ref 25–35)
MCHC RBC AUTO-ENTMCNC: 32.4 G/DL (ref 31–37)
MCV RBC AUTO: 78.4 FL (ref 78–102)
NEUTS SEG # BLD: 6.3 K/UL (ref 1.8–9.5)
NEUTS SEG NFR BLD: 65 % (ref 40–70)
PLATELET # BLD AUTO: 405 K/UL (ref 140–440)
RBC # BLD AUTO: 4.72 M/UL (ref 4.1–5.1)
WBC # BLD AUTO: 9.6 K/UL (ref 4.5–13)

## 2017-11-17 NOTE — PROGRESS NOTES
Hematology/Oncology  Progress Note    Name: Haresh Foster  Date: 2017  : 1955      Ms. Conner Doty is a 57year old female who was seen for management of her invasive ductal adenocarcinoma right and anemia. Current therapy: Femara 2.5 mg daily and oral iron supplementation daily. Subjective:     Ms. Conner Doty is a 57-year-old woman who is currently taking Femara 2.5 mg daily as long-term management for her invasive ductal carcinoma of the breast.  She continues to tolerate the medication well. The patient reports that she is doing her breast self-exam on a monthly basis. Additionally, she continues to take the iron supplement once daily. She reports she is currently undergoing therapy for lymphedema to the [facility] and has some improvement in pain and swelling. She has a new sleeve and compression garment. She has no complaints of pain at this time. She denies CP or SOB. The patient reports her appetite is good. Her most recent mammogram was done in 2017 and there was no detectable abnormality. Past medical history, family history, and social history were reviewed and remain unchanged. Past Medical History:   Diagnosis Date    Arthritis     Arthropathy, unspecified, site unspecified     Asthma     Mild.  Breast CA ([facility] 2014    Right Breast    Cancer of breast ([facility] [address] [facility] 14    pt states she received the call last Tuesday    Chemotherapy convalescence or palliative care     Diabetes ([facility]     Diabetes mellitus     Essential hypertension     Family history of breast cancer     GERD (gastroesophageal reflux disease)     Hematuria     Hiatus hernia syndrome     Hypertension     Kidney stone     Morbid obesity (HCC)     Nausea & vomiting     Nausea with vomiting     Postop N/V    Radiation therapy complication     Recurrent UTI     Renal colic     S/P breast biopsy - right breast calcifications 10/18/10     Shortness of breath     With exertion.      Staghorn calculus     Struvite kidney stones     Thyroid dysfunction     GOITER    Urethral diverticulum     Urine leukocytes     UTI (lower urinary tract infection)      Past Surgical History:   Procedure Laterality Date    CYSTOSCOPY      HX BREAST BIOPSY  10/18/10    stereotactic - right breast calcifications    HX BREAST BIOPSY  2/14/2014    RIGHT BREAST BIOPSY performed by Wilberto Xiong MD at [facility] MAIN OR    HX CYST REMOVAL  1997    Bilateral breast.     HX GYN      urethra reconstruction x3    HX HYSTERECTOMY      HX LITHOTRIPSY      HX MODIFIED RADICAL MASTECTOMY  3/21/2014    RIGHT MODIFIED RADICAL MASTECTOMY SENTINAL LYMPH NODE BIOPSY performed by Wilberto Xiong MD at [facility] MAIN OR    HX TUBAL LIGATION      HX UROLOGICAL      4 diverticuli repaired    HX VASCULAR ACCESS  2014     Social History     Social History    Marital status: SINGLE     Spouse name: N/A    Number of children: N/A    Years of education: N/A     Occupational History    Not on file. Social History Main Topics    Smoking status: Never Smoker    Smokeless tobacco: Never Used    Alcohol use 0.5 oz/week     1 Glasses of wine per week      Comment: occasionally    Drug use: No    Sexual activity: Not on file     Other Topics Concern    Not on file     Social History Narrative     Family History   Problem Relation Age of Onset    Breast Cancer Sister 79    Diabetes Mother     Hypertension Mother     Breast Cancer Sister 39    Hypertension Other      Parent    Diabetes Other      parent    Stroke Other      parent    Hypertension Other      sibling    Diabetes Other      sibling    Osteoporosis Other      sibling     Current Outpatient Prescriptions   Medication Sig Dispense Refill    oxyCODONE-acetaminophen (PERCOCET) 5-325 mg per tablet 1 or 2 tablets by mouth every 4 hours as needed 120 Tab 0    cyclobenzaprine (FLEXERIL) 5 mg tablet Take 1 Tab by mouth two (2) times daily as needed for Muscle Spasm(s). Indications: MUSCLE SPASM 60 Tab 0    ibuprofen (MOTRIN) 600 mg tablet Take 1 Tab by mouth every six (6) hours as needed for Pain. 120 Tab 0    benzonatate (TESSALON) 100 mg capsule Take 100 mg by mouth three (3) times daily as needed for Cough.  guaiFENesin (ROBITUSSIN) 100 mg/5 mL liquid Take 200 mg by mouth three (3) times daily as needed for Cough.  acetohydroxamic acid (LITHOSTAT) 250 mg tab Take 1 Tab by mouth two (2) times daily (after meals). Indications: BACTERIAL URINARY TRACT INFECTION, STRUVITE RENAL CALCULI 60 Tab 12    letrozole (FEMARA) 2.5 mg tablet Take 1 Tab by mouth daily. 90 Tab 3    L-Mfolate-B6 Phos-Methyl-B12 (METANX) 3-35-2 mg tab Take 2.8 mg by mouth.  losartan (COZAAR) 50 mg tablet daily.  HUMALOG KWIKPEN 100 unit/mL kwikpen 10 Units by SubCUTAneous route Before breakfast, lunch, dinner and at bedtime.  temazepam (RESTORIL) 15 mg capsule Take 1 tab po Q HS may, repeat in 1 hour if needed 60 Cap 2    cetirizine (ZYRTEC) 10 mg tablet Take  by mouth daily.  multivitamin (ONE A DAY) tablet Take 1 Tab by mouth daily.  Cholecalciferol, Vitamin D3, 50,000 unit cap Take  by mouth every seven (7) days.  ascorbic acid (VITAMIN C) 500 mg tablet Take  by mouth.  LEVOTHYROXINE SODIUM (LEVOTHYROXINE PO) Take 0.88 mg by mouth once. Review of Systems  Constitutional: The patient denies acute distress or discomfort  HEENT: The patient denies recent head trauma, eye pain, blurred vision,  hearing deficit, oropharyngeal mucosal pain or lesions, and the patient denies throat pain or discomfort. Lymphatics: The patient denies palpable peripheral lymphadenopathy. Hematologic: The patient denies having bruising, bleeding, or progressive fatigue. Respiratory: Patient denies having shortness of breath, cough, sputum production, fever, or dyspnea on exertion. Cardiovascular:  The patient denies having leg pain, leg swelling, heart palpitations, chest permit, chest pain, or lightheadedness. The patient denies having dyspnea on exertion. Gastrointestinal: The patient denies having nausea, emesis, or diarrhea. The patient denies having any hematemesis or blood in the stool. Genitourinary: Patient denies having urinary urgency, frequency, or dysuria. The patient denies having blood in the urine. Psychological: The patient denies having symptoms of nervousness, anxiety, depression, or thoughts of harming himself some of this. Skin: Patient denies having skin rashes, skin, ulcerations, or unexplained itching or pruritus. Musculoskeletal: The patient has swelling in the right arm due to progressive lymphedema. She is now wearing a lymphedema sleeve. Anterior chest wall and breasts: She denies muscle or joint aches or pains    Objective:     Visit Vitals    /72 (BP 1 Location: Left arm, BP Patient Position: Sitting)    Pulse (!) 103    Temp 99 °F (37.2 °C) (Oral)    Wt 111.4 kg (245 lb 9.6 oz)    BMI 49.61 kg/m2     ECOG PS=0 Pain score 0/10     Physical Exam:   Gen. Appearance: The patient is in no acute distress. Skin: There is no bruise or rash. HEENT: The exam is unremarkable. Neck: Supple without lymphadenopathy or thyromegaly. Lungs: Clear to auscultation and percussion; there are no wheezes or rhonchi. Heart: Regular rate and rhythm; there are no murmurs, gallops, or rubs. Anterior chest wall. Breasts: There is no evidence of local recurrence of disease. The axilla reveals no palpable axillary lymphadenopathy. Abdomen: Bowel sounds are present and normal.  There is no guarding, tenderness, or hepatosplenomegaly. Extremities: There is no clubbing, cyanosis. 2+ edema to RUE, patient is wearing a compression sleeve. Neurologic: There are no focal neurologic deficits. Lymphatics: There is no palpable peripheral lymphadenopathy. Musculoskeletal: The patient has full range of motion at all joints.   There is no evidence of joint deformity or effusions. The patient has  right arm lymphedema extending from the fingers up to the shoulder area on the right. Psychological/psychiatric: There is no clinical evidence of anxiety, depression, or melancholy. Lab data:      Results for orders placed or performed during the hospital encounter of 11/17/17   CBC WITH 3 PART DIFF     Status: None   Result Value Ref Range Status    WBC 9.6 4.5 - 13.0 K/uL Final    RBC 4.72 4.10 - 5.10 M/uL Final    HGB 12.0 12.0 - 16.0 g/dL Final    HCT 37.0 36 - 48 % Final    MCV 78.4 78 - 102 FL Final    MCH 25.4 25.0 - 35.0 PG Final    MCHC 32.4 31 - 37 g/dL Final    RDW 13.2 11.5 - 14.5 % Final    PLATELET 519 194 - 711 K/uL Final    NEUTROPHILS 65 40 - 70 % Final    MIXED CELLS 8 0.1 - 17 % Final    LYMPHOCYTES 27 14 - 44 % Final    ABS. NEUTROPHILS 6.3 1.8 - 9.5 K/UL Final    ABS. MIXED CELLS 0.7 0.0 - 2.3 K/uL Final    ABS. LYMPHOCYTES 2.6 1.1 - 5.9 K/UL Final     Comment: Test performed at [place] [address] Location. Results Reviewed by Medical Director. DF AUTOMATED   Final           Assessment:     1. Malignant neoplasm of upper-outer quadrant of right female breast, unspecified estrogen receptor status ([facility] [address]    2. Lymphedema of upper extremity following lymphadenectomy    3. Muscle spasm    4. Arthritis        Plan:   Right arm and hand lymphedema:She is currently undergoing lymphedema therapy and continues to wear her new sleeve, glove, and chest garment. I advised her to to avoid lifting  more than 10 pounds using the right arm and hand. She reports that the lymphedema has significantly improved since her last clinic visit. Breast cancer: I have encouraged patient to continue to her monthly breast self-examinations. At this time I will order a CA 27-29 level, and a comprehensive metabolic panel. The most recent CA 27-29 level was 35.8 U/mL from her clinic visit dated 8/19/2017.    The most recent mammogram in Feb 2017 was negative for malignancy. Arthritis: Motrin 600 mg every 6 hours will be ordered and she was instructed to use Percocet for severe pain. Muscle Spasms: Rx for Flexeril 5mg was ordered with instructions to take 1 TID as needed. I will have the patient return to the clinic again in 4 months.   Orders Placed This Encounter    COMPLETE CBC & AUTO DIFF WBC    InHouse CBC ([facility])     Standing Status:   Future     Number of Occurrences:   1     Standing Expiration Date:   81/88/4383    METABOLIC PANEL, COMPREHENSIVE     Standing Status:   Future     Number of Occurrences:   1     Standing Expiration Date:   11/18/2018    CA 27.29     Standing Status:   Future     Number of Occurrences:   1     Standing Expiration Date:   11/18/2018       Nataliya Bazzi MD  11/17/2017

## 2017-11-17 NOTE — MR AVS SNAPSHOT
Visit Information Date & Time Provider Department Dept. Phone Encounter #  
 11/17/2017  3:15 PM Christa Sena [address] Office [phone] [id number] Follow-up Instructions Return in about 4 months (around 3/17/2018). Your Appointments 2/5/2018  9:30 AM  
[facility] Appt Note: Return in about 6 months (around 1/19/2018) for KUB  
 [address] [place] [address] [address]  
  
   
 [address] [phone]  
  
    
 2/5/2018  9:45 AM  
ESTABLISHED PATIENT with Karli Mitchell MD  
[facility]. Devon Schreiber [facility]) Appt Note: Return in about 6 months (around 1/19/2018) for KUB  
 [address] [place] [address] [address]  
  
   
 [address] [id number]  
  
    
 3/16/2018 10:45 AM  
[facility] Appt Note: 4 mo fu  
 [employer] [address] [address]  
[phone]  
  
   
 [employer] [address] 35 [facility]  
  
    
 9/20/2018 10:00 AM  
Follow Up with [facility] ([facility]) Appt Note: 18 month fu after study at our lab on 9/7/2018; .  
 [address] [facility]  
[phone] 23[address] [facility] 200 [facility] Upcoming Health Maintenance Date Due Hepatitis C Screening 1955 FOOT EXAM Q1 5/2/1963 MICROALBUMIN Q1 5/2/1963 EYE EXAM RETINAL OR DILATED Q1 5/2/1963 Pneumococcal 19-64 Highest Risk (1 of 3 - PCV13) 5/2/1972 DTaP/Tdap/Td series (1 - Tdap) 5/2/1974 PAP AKA CERVICAL CYTOLOGY 5/2/1974 FOBT Q 1 YEAR AGE 50-75 5/2/2003 HEMOGLOBIN A1C Q6M 3/25/2011 LIPID PANEL Q1 9/25/2011 ZOSTER VACCINE AGE 60> 3/2/2013 Influenza Age 5 to Adult 8/1/2017 BREAST CANCER SCRN MAMMOGRAM 3/9/2019 Allergies as of 11/17/2017  Review Complete On: 8/18/2017 By: Kina Biggs MD  
  
 Severity Noted Reaction Type Reactions Ampicillin  02/16/2012    Hives Ceftriaxone  11/16/2016    Other (comments) Patient received medication for UTI treatment and immediately developed itching and anxiousness. Iva  06/05/2015    Hives Not allergic anymore, it was only the pesticide Current Immunizations  Reviewed on 7/3/2015 No immunizations on file. Not reviewed this visit You Were Diagnosed With   
  
 Codes Comments Malignant neoplasm of upper-outer quadrant of right female breast, unspecified estrogen receptor status ([facility]    -  Primary ICD-10-CM: C50.411 ICD-9-CM: 174.4 Lymphedema of upper extremity following lymphadenectomy     ICD-10-CM: E89.89, I89.0 ICD-9-CM: 997.99, 698.7 Muscle spasm     ICD-10-CM: P54.531 ICD-9-CM: 728.85 Arthritis     ICD-10-CM: M19.90 ICD-9-CM: 716.90 Vitals BP Pulse Temp Weight(growth percentile) BMI OB Status 148/72 (BP 1 Location: Left arm, BP Patient Position: Sitting) (!) 103 99 °F (37.2 °C) (Oral) 245 lb 9.6 oz (111.4 kg) 49.61 kg/m2 Hysterectomy Smoking Status Never Smoker BMI and BSA Data Body Mass Index Body Surface Area  
 49.61 kg/m 2 2.15 m 2 Preferred Pharmacy Pharmacy Name [facility] [address] [place] [address] [phone] Your Updated Medication List  
  
   
This list is accurate as of: 11/17/17  4:02 PM.  Always use your most recent med list.  
  
  
  
  
 acetohydroxamic acid 250 mg Tab Commonly known as:  [employer] Joesph Take 1 Tab by mouth two (2) times daily (after meals). Indications: BACTERIAL URINARY TRACT INFECTION, STRUVITE RENAL CALCULI  
  
 benzonatate 100 mg capsule Commonly known as:  TESSALON  
 Take 100 mg by mouth three (3) times daily as needed for Cough. Cholecalciferol (Vitamin D3) 50,000 unit Cap Take  by mouth every seven (7) days. cyclobenzaprine 5 mg tablet Commonly known as:  FLEXERIL Take 1 Tab by mouth two (2) times daily as needed for Muscle Spasm(s). Indications: MUSCLE SPASM  
  
 guaiFENesin 100 mg/5 mL liquid Commonly known as:  ROBITUSSIN Take 200 mg by mouth three (3) times daily as needed for Cough. HumaLOG KwikPen 100 unit/mL kwikpen Generic drug:  insulin lispro 10 Units by SubCUTAneous route Before breakfast, lunch, dinner and at bedtime. ibuprofen 600 mg tablet Commonly known as:  MOTRIN Take 1 Tab by mouth every six (6) hours as needed for Pain. letrozole 2.5 mg tablet Commonly known as:  [facility] Take 1 Tab by mouth daily. LEVOTHYROXINE PO Take 0.88 mg by mouth once. losartan 50 mg tablet Commonly known as:  COZAAR  
daily. METANX 3-35-2 mg Tab tab Generic drug:  L-Mfolate-B6 Phos-Methyl-B12 Take 2.8 mg by mouth.  
  
 multivitamin tablet Commonly known as:  ONE A DAY Take 1 Tab by mouth daily. oxyCODONE-acetaminophen 5-325 mg per tablet Commonly known as:  PERCOCET  
1 or 2 tablets by mouth every 4 hours as needed  
  
 temazepam 15 mg capsule Commonly known as:  RESTORIL Take 1 tab po Q HS may, repeat in 1 hour if needed VITAMIN C 500 mg tablet Generic drug:  ascorbic acid (vitamin C) Take  by mouth. ZyrTEC 10 mg tablet Generic drug:  cetirizine Take  by mouth daily. We Performed the Following CA 27.29 [61539 CPT(R)] COMPLETE CBC & AUTO DIFF WBC [85411 CPT(R)] METABOLIC PANEL, COMPREHENSIVE [85621 CPT(R)] Follow-up Instructions Return in about 4 months (around 3/17/2018). To-Do List   
 11/17/2017 Lab:  CBC WITH 3 PART DIFF Patient Instructions Breast Cancer: Care Instructions Your Care Instructions Breast cancer occurs when abnormal cells grow out of control in the breast. These cancer cells can spread within the breast, to nearby lymph nodes and other tissues, and to other parts of the body. Being treated for cancer can weaken your body, and you may feel very tired. Get the rest your body needs so you can feel better. Finding out that you have cancer is scary. You may feel many emotions and may need some help coping. Seek out family, friends, and counselors for support. You also can do things at home to make yourself feel better while you go through treatment. Call the [employer] (1-396.424.6410) or visit its website at [address] for more information. Follow-up care is a key part of your treatment and safety. Be sure to make and go to all appointments, and call your doctor if you are having problems. It's also a good idea to know your test results and keep a list of the medicines you take. How can you care for yourself at home? · Take your medicines exactly as prescribed. Call your doctor if you think you are having a problem with your medicine. You may get medicine for nausea and vomiting if you have these side effects. · Follow your doctor's instructions to relieve pain. Pain from cancer and surgery can almost always be controlled. Use pain medicine when you first notice pain, before it becomes severe. · Eat healthy food. If you do not feel like eating, try to eat food that has protein and extra calories to keep up your strength and prevent weight loss. Drink liquid meal replacements for extra calories and protein. Try to eat your main meal early. · Get some physical activity every day, but do not get too tired. Keep doing the hobbies you enjoy as your energy allows. · Do not smoke. Smoking can make your cancer worse. If you need help quitting, talk to your doctor about stop-smoking programs and medicines. These can increase your chances of quitting for good. · Take steps to control your stress and workload. Learn relaxation techniques. ¨ Share your feelings. Stress and tension affect our emotions. By expressing your feelings to others, you may be able to understand and cope with them. ¨ Consider joining a support group. Talking about a problem with your spouse, a good friend, or other people with similar problems is a good way to reduce tension and stress. ¨ Express yourself through art. Try writing, crafts, dance, or art to relieve stress. Some dance, writing, or art groups may be available just for people who have cancer. ¨ Be kind to your body and mind. Getting enough sleep, eating a healthy diet, and taking time to do things you enjoy can contribute to an overall feeling of balance in your life and can help reduce stress. ¨ Get help if you need it. Discuss your concerns with your doctor or counselor. · If you are vomiting or have diarrhea: ¨ Drink plenty of fluids (enough so that your urine is light yellow or clear like water) to prevent dehydration. Choose water and other caffeine-free clear liquids. If you have kidney, heart, or liver disease and have to limit fluids, talk with your doctor before you increase the amount of fluids you drink. ¨ When you are able to eat, try clear soups, mild foods, and liquids until all symptoms are gone for 12 to 48 hours. Other good choices include dry toast, crackers, cooked cereal, and gelatin dessert, such as Jell-O. · If you have not already done so, prepare a list of advance directives. Advance directives are instructions to your doctor and family members about what kind of care you want if you become unable to speak or express yourself. When should you call for help? Call 911 anytime you think you may need emergency care. For example, call if: 
? · You passed out (lost consciousness). ?Call your doctor now or seek immediate medical care if: 
? · You have a fever. ? · You have abnormal bleeding. ? · You think you have an infection. ? · You have new or worse pain. ? · You have new symptoms, such as a cough, belly pain, vomiting, diarrhea, or a rash. ? Watch closely for changes in your health, and be sure to contact your doctor if: 
? · You are much more tired than usual.  
? · You have swollen glands in your armpits, groin, or neck. ? · You do not get better as expected. Where can you learn more? Go to http://[email or web address]-[email or web address].info/. Enter V321 in the search box to learn more about \"Breast Cancer: Care Instructions. \" Current as of: May 12, 2017 Content Version: 11.4 © 3460-2534 [employer]. Care instructions adapted under license by [employer] (which disclaims liability or warranty for this information). If you have questions about a medical condition or this instruction, always ask your healthcare professional. [address] any warranty or liability for your use of this information. Introducing [facility] [place] [facility]! [facility] introduces [employer] patient portal. Now you can access parts of your medical record, email your doctor's office, and request medication refills online. 1. In your internet browser, go to [email or web address] 2. Click on the First Time User? Click Here link in the Sign In box. You will see the New Member Sign Up page. 3. Enter your [employer] Access Code exactly as it appears below. You will not need to use this code after youve completed the sign-up process. If you do not sign up before the expiration date, you must request a new code. · [employer] Access Code: [email or web address] Expires: 2/15/2018  4:02 PM 
 
4. Enter the last four digits of your Social Security Number (xxxx) and Date of Birth (mm/dd/yyyy) as indicated and click Submit. You will be taken to the next sign-up page. 5. Create a [employer] ID.  This will be your [employer] login ID and cannot be changed, so think of one that is secure and easy to remember. 6. Create a [employer] password. You can change your password at any time. 7. Enter your Password Reset Question and Answer. This can be used at a later time if you forget your password. 8. Enter your e-mail address. You will receive e-mail notification when new information is available in [address] 9. Click Sign Up. You can now view and download portions of your medical record. 10. Click the Download Summary menu link to download a portable copy of your medical information. If you have questions, please visit the Frequently Asked Questions section of the [employer] website. Remember, [employer] is NOT to be used for urgent needs. For medical emergencies, dial 911. Now available from your iPhone and Android! Please provide this summary of care documentation to your next provider. Your primary care clinician is listed as Chuy Olsen. If you have any questions after today's visit, please call [phone].

## 2017-11-17 NOTE — PATIENT INSTRUCTIONS
Breast Cancer: Care Instructions  Your Care Instructions    Breast cancer occurs when abnormal cells grow out of control in the breast. These cancer cells can spread within the breast, to nearby lymph nodes and other tissues, and to other parts of the body. Being treated for cancer can weaken your body, and you may feel very tired. Get the rest your body needs so you can feel better. Finding out that you have cancer is scary. You may feel many emotions and may need some help coping. Seek out family, friends, and counselors for support. You also can do things at home to make yourself feel better while you go through treatment. Call the [employer] (5-408.946.6209) or visit its website at [employer] for more information. Follow-up care is a key part of your treatment and safety. Be sure to make and go to all appointments, and call your doctor if you are having problems. It's also a good idea to know your test results and keep a list of the medicines you take. How can you care for yourself at home? · Take your medicines exactly as prescribed. Call your doctor if you think you are having a problem with your medicine. You may get medicine for nausea and vomiting if you have these side effects. · Follow your doctor's instructions to relieve pain. Pain from cancer and surgery can almost always be controlled. Use pain medicine when you first notice pain, before it becomes severe. · Eat healthy food. If you do not feel like eating, try to eat food that has protein and extra calories to keep up your strength and prevent weight loss. Drink liquid meal replacements for extra calories and protein. Try to eat your main meal early. · Get some physical activity every day, but do not get too tired. Keep doing the hobbies you enjoy as your energy allows. · Do not smoke. Smoking can make your cancer worse. If you need help quitting, talk to your doctor about stop-smoking programs and medicines.  These can increase your chances of quitting for good. · Take steps to control your stress and workload. Learn relaxation techniques. ¨ Share your feelings. Stress and tension affect our emotions. By expressing your feelings to others, you may be able to understand and cope with them. ¨ Consider joining a support group. Talking about a problem with your spouse, a good friend, or other people with similar problems is a good way to reduce tension and stress. ¨ Express yourself through art. Try writing, crafts, dance, or art to relieve stress. Some dance, writing, or art groups may be available just for people who have cancer. ¨ Be kind to your body and mind. Getting enough sleep, eating a healthy diet, and taking time to do things you enjoy can contribute to an overall feeling of balance in your life and can help reduce stress. ¨ Get help if you need it. Discuss your concerns with your doctor or counselor. · If you are vomiting or have diarrhea:  ¨ Drink plenty of fluids (enough so that your urine is light yellow or clear like water) to prevent dehydration. Choose water and other caffeine-free clear liquids. If you have kidney, heart, or liver disease and have to limit fluids, talk with your doctor before you increase the amount of fluids you drink. ¨ When you are able to eat, try clear soups, mild foods, and liquids until all symptoms are gone for 12 to 48 hours. Other good choices include dry toast, crackers, cooked cereal, and gelatin dessert, such as Jell-O.  · If you have not already done so, prepare a list of advance directives. Advance directives are instructions to your doctor and family members about what kind of care you want if you become unable to speak or express yourself. When should you call for help? Call 911 anytime you think you may need emergency care. For example, call if:  ? · You passed out (lost consciousness). ?Call your doctor now or seek immediate medical care if:  ? · You have a fever. ? · You have abnormal bleeding. ? · You think you have an infection. ? · You have new or worse pain. ? · You have new symptoms, such as a cough, belly pain, vomiting, diarrhea, or a rash. ? Watch closely for changes in your health, and be sure to contact your doctor if:  ? · You are much more tired than usual.   ? · You have swollen glands in your armpits, groin, or neck. ? · You do not get better as expected. Where can you learn more? Go to http://alexsandra-rachel.info/. Enter V321 in the search box to learn more about \"Breast Cancer: Care Instructions. \"  Current as of: May 12, 2017  Content Version: 11.4  © 3853-4028 [employer]. Care instructions adapted under license by [employer] (which disclaims liability or warranty for this information). If you have questions about a medical condition or this instruction, always ask your healthcare professional. [address] any warranty or liability for your use of this information.

## 2017-11-20 LAB
ALBUMIN SERPL-MCNC: 3.9 G/DL (ref 3.6–4.8)
ALBUMIN/GLOB SERPL: 1.2 {RATIO} (ref 1.2–2.2)
ALP SERPL-CCNC: 109 IU/L (ref 39–117)
ALT SERPL-CCNC: 11 IU/L (ref 0–32)
AST SERPL-CCNC: 14 IU/L (ref 0–40)
BILIRUB SERPL-MCNC: <0.2 MG/DL (ref 0–1.2)
BUN SERPL-MCNC: 23 MG/DL (ref 8–27)
BUN/CREAT SERPL: 13 (ref 12–28)
CALCIUM SERPL-MCNC: 8.8 MG/DL (ref 8.7–10.3)
CANCER AG27-29 SERPL-ACNC: 33.7 U/ML (ref 0–38.6)
CHLORIDE SERPL-SCNC: 102 MMOL/L (ref 96–106)
CO2 SERPL-SCNC: 20 MMOL/L (ref 18–29)
CREAT SERPL-MCNC: 1.8 MG/DL (ref 0.57–1)
GFR SERPLBLD CREATININE-BSD FMLA CKD-EPI: 29 ML/MIN/1.73
GFR SERPLBLD CREATININE-BSD FMLA CKD-EPI: 34 ML/MIN/1.73
GLOBULIN SER CALC-MCNC: 3.3 G/DL (ref 1.5–4.5)
GLUCOSE SERPL-MCNC: 263 MG/DL (ref 65–99)
POTASSIUM SERPL-SCNC: 4.8 MMOL/L (ref 3.5–5.2)
PROT SERPL-MCNC: 7.2 G/DL (ref 6–8.5)
SODIUM SERPL-SCNC: 139 MMOL/L (ref 134–144)

## 2017-11-20 RX ORDER — LETROZOLE 2.5 MG/1
2.5 TABLET, FILM COATED ORAL DAILY
Qty: 90 TAB | Refills: 3 | Status: SHIPPED | OUTPATIENT
Start: 2017-11-20 | End: 2018-04-10 | Stop reason: SDUPTHER

## 2018-02-05 PROBLEM — E66.01 OBESITY, MORBID (HCC): Status: ACTIVE | Noted: 2018-02-05

## 2018-02-05 PROBLEM — E11.21 TYPE 2 DIABETES MELLITUS WITH NEPHROPATHY (HCC): Status: ACTIVE | Noted: 2018-02-05

## 2018-04-10 ENCOUNTER — HOSPITAL ENCOUNTER (OUTPATIENT)
Dept: ONCOLOGY | Age: 63
Discharge: HOME | End: 2018-04-10

## 2018-04-10 ENCOUNTER — OFFICE VISIT (OUTPATIENT)
Dept: ONCOLOGY | Age: 63
End: 2018-04-10

## 2018-04-10 VITALS
SYSTOLIC BLOOD PRESSURE: 107 MMHG | BODY MASS INDEX: 49.08 KG/M2 | HEART RATE: 101 BPM | DIASTOLIC BLOOD PRESSURE: 66 MMHG | TEMPERATURE: 98.7 F | WEIGHT: 243 LBS

## 2018-04-10 DIAGNOSIS — I89.0 LYMPHEDEMA OF UPPER EXTREMITY FOLLOWING LYMPHADENECTOMY: ICD-10-CM

## 2018-04-10 DIAGNOSIS — M62.838 MUSCLE SPASM: ICD-10-CM

## 2018-04-10 DIAGNOSIS — E89.89 LYMPHEDEMA OF UPPER EXTREMITY FOLLOWING LYMPHADENECTOMY: ICD-10-CM

## 2018-04-10 DIAGNOSIS — M19.90 ARTHRITIS: ICD-10-CM

## 2018-04-10 DIAGNOSIS — C50.411 MALIGNANT NEOPLASM OF UPPER-OUTER QUADRANT OF RIGHT FEMALE BREAST, UNSPECIFIED ESTROGEN RECEPTOR STATUS (HCC): ICD-10-CM

## 2018-04-10 DIAGNOSIS — C50.411 MALIGNANT NEOPLASM OF UPPER-OUTER QUADRANT OF RIGHT FEMALE BREAST, UNSPECIFIED ESTROGEN RECEPTOR STATUS (HCC): Primary | ICD-10-CM

## 2018-04-10 LAB
BASO+EOS+MONOS # BLD AUTO: 0.6 K/UL (ref 0–2.3)
BASO+EOS+MONOS # BLD AUTO: 6 % (ref 0.1–17)
DIFFERENTIAL METHOD BLD: ABNORMAL
ERYTHROCYTE [DISTWIDTH] IN BLOOD BY AUTOMATED COUNT: 13.4 % (ref 11.5–14.5)
HCT VFR BLD AUTO: 41.7 % (ref 36–48)
HGB BLD-MCNC: 13.6 G/DL (ref 12–16)
LYMPHOCYTES # BLD: 2.1 K/UL (ref 1.1–5.9)
LYMPHOCYTES NFR BLD: 22 % (ref 14–44)
MCH RBC QN AUTO: 25.5 PG (ref 25–35)
MCHC RBC AUTO-ENTMCNC: 32.6 G/DL (ref 31–37)
MCV RBC AUTO: 78.1 FL (ref 78–102)
NEUTS SEG # BLD: 6.8 K/UL (ref 1.8–9.5)
NEUTS SEG NFR BLD: 72 % (ref 40–70)
PLATELET # BLD AUTO: 316 K/UL (ref 140–440)
RBC # BLD AUTO: 5.34 M/UL (ref 4.1–5.1)
WBC # BLD AUTO: 9.5 K/UL (ref 4.5–13)

## 2018-04-10 RX ORDER — OXYCODONE AND ACETAMINOPHEN 5; 325 MG/1; MG/1
TABLET ORAL
Qty: 120 TAB | Refills: 0 | Status: SHIPPED | OUTPATIENT
Start: 2018-04-10 | End: 2018-07-16

## 2018-04-10 RX ORDER — LETROZOLE 2.5 MG/1
2.5 TABLET, FILM COATED ORAL DAILY
Qty: 90 TAB | Refills: 3 | Status: SHIPPED | OUTPATIENT
Start: 2018-04-10 | End: 2018-07-16

## 2018-04-10 RX ORDER — IBUPROFEN 600 MG/1
600 TABLET ORAL
Qty: 120 TAB | Refills: 0 | Status: SHIPPED | OUTPATIENT
Start: 2018-04-10 | End: 2019-10-11 | Stop reason: SDUPTHER

## 2018-04-10 NOTE — PROGRESS NOTES
Hematology/Oncology  Progress Note    Name: Sindy Waters  Date: 4/10/2018  : 1955      Ms. Roxanna Basilio is a 57year old female who was seen for management of her invasive ductal adenocarcinoma right and anemia. Current therapy: Femara 2.5 mg daily and oral iron supplementation daily. Subjective:     Ms. Roxanna Basilio is a 57-year-old woman who is currently taking Femara 2.5 mg daily as long-term management for her invasive ductal carcinoma of the breast.  She continues to tolerate the medication well. The patient reports that she is doing her breast self-exam on a monthly basis. Additionally, she continues to take the iron supplement once daily. She reports she is currently undergoing therapy for lymphedema to the [facility] and has some improvement in pain and swelling. She is wearing her new sleeve and compression garment. She has no complaints of pain at this time. She denies CP or SOB. The patient reports her appetite is good. Her most recent mammogram was done in 2017 and there was no detectable abnormality. Past medical history, family history, and social history were reviewed and remain unchanged. Past Medical History:   Diagnosis Date    Arthritis     Arthropathy, unspecified, site unspecified     Asthma     Mild.  Breast CA ([address] 2014    Right Breast    Chemotherapy convalescence or palliative care     Diabetes mellitus     Family history of breast cancer     GERD (gastroesophageal reflux disease)     Hematuria     Hiatus hernia syndrome     Hypertension     Kidney stone     Morbid obesity (HCC)     Nausea with vomiting     Postop N/V    Radiation therapy complication     Recurrent UTI     Renal colic     S/P breast biopsy - right breast calcifications 10/18/10     Shortness of breath     With exertion.      Staghorn calculus     Struvite kidney stones     Thyroid dysfunction     GOITER    Urethral diverticulum     Urine leukocytes     UTI (lower urinary tract infection)      Past Surgical History:   Procedure Laterality Date    HX BREAST BIOPSY  10/18/10    stereotactic - right breast calcifications    HX BREAST BIOPSY  2/14/2014    RIGHT BREAST BIOPSY performed by Fanny Savage MD at [address] [place] [address] HX CYST REMOVAL  1997    Bilateral breast.     HX GYN      urethra reconstruction x3    HX HYSTERECTOMY      HX MODIFIED RADICAL MASTECTOMY  3/21/2014    RIGHT MODIFIED RADICAL MASTECTOMY SENTINAL LYMPH NODE BIOPSY performed by Fanny Savage MD at [facility] MAIN OR    HX TUBAL LIGATION      HX UROLOGICAL      4 diverticuli repaired    HX UROLOGICAL  03/29/2011    Cysto, percutaneous nephrolithotomy, antegrade URS, antegrade pyelogram.  Dr. Roberta Ruiz, [facility].    HX UROLOGICAL  12/11/2009    Cysto, flexible URS, right RPG, lasertripsy, JJ stent placement. Dr. Roberta Ruiz, [facility].    HX UROLOGICAL  11/20/2009    Second look right attempted percutaneous nephrolithotomy. Dr. Roberta Ruiz, [facility].    HX UROLOGICAL  10/23/2009    Cysto, right percutaneous nephrolithotomy. Dr. Roberta Ruiz, [facility].    HX VASCULAR ACCESS  2014     Social History     Social History    Marital status: SINGLE     Spouse name: N/A    Number of children: N/A    Years of education: N/A     Occupational History    Not on file.      Social History Main Topics    Smoking status: Never Smoker    Smokeless tobacco: Never Used    Alcohol use 0.5 oz/week     1 Glasses of wine per week      Comment: occasionally    Drug use: No    Sexual activity: Not on file     Other Topics Concern    Not on file     Social History Narrative     Family History   Problem Relation Age of Onset    Breast Cancer Sister 79    Diabetes Mother     Hypertension Mother     Breast Cancer Sister 39    Hypertension Other      Parent    Diabetes Other      parent    Stroke Other      parent    Hypertension Other      sibling    Diabetes Other      sibling    Osteoporosis Other      sibling     Current Outpatient Prescriptions   Medication Sig Dispense Refill    letrozole (FEMARA) 2.5 mg tablet Take 1 Tab by mouth daily. 90 Tab 3    oxyCODONE-acetaminophen (PERCOCET) 5-325 mg per tablet 1 or 2 tablets by mouth every 4 hours as needed 120 Tab 0    ibuprofen (MOTRIN) 600 mg tablet Take 1 Tab by mouth every six (6) hours as needed for Pain. 120 Tab 0    acetohydroxamic acid (LITHOSTAT) 250 mg tab Take 1 Tab by mouth two (2) times daily (after meals). Indications: BACTERIAL URINARY TRACT INFECTION, STRUVITE RENAL CALCULI 60 Tab 12    cyclobenzaprine (FLEXERIL) 5 mg tablet Take 1 Tab by mouth two (2) times daily as needed for Muscle Spasm(s). Indications: MUSCLE SPASM 60 Tab 0    benzonatate (TESSALON) 100 mg capsule Take 100 mg by mouth three (3) times daily as needed for Cough.  guaiFENesin (ROBITUSSIN) 100 mg/5 mL liquid Take 200 mg by mouth three (3) times daily as needed for Cough.  L-Mfolate-B6 Phos-Methyl-B12 (METANX) 3-35-2 mg tab Take 2.8 mg by mouth.  losartan (COZAAR) 50 mg tablet daily.  HUMALOG KWIKPEN 100 unit/mL kwikpen 10 Units by SubCUTAneous route Before breakfast, lunch, dinner and at bedtime.  temazepam (RESTORIL) 15 mg capsule Take 1 tab po Q HS may, repeat in 1 hour if needed 60 Cap 2    cetirizine (ZYRTEC) 10 mg tablet Take  by mouth daily.  multivitamin (ONE A DAY) tablet Take 1 Tab by mouth daily.  Cholecalciferol, Vitamin D3, 50,000 unit cap Take  by mouth every seven (7) days.  ascorbic acid (VITAMIN C) 500 mg tablet Take  by mouth.  LEVOTHYROXINE SODIUM (LEVOTHYROXINE PO) Take 0.88 mg by mouth once. Review of Systems  Constitutional: The patient denies acute distress or discomfort  HEENT: The patient denies recent head trauma, eye pain, blurred vision,  hearing deficit, oropharyngeal mucosal pain or lesions, and the patient denies throat pain or discomfort. Lymphatics: The patient denies palpable peripheral lymphadenopathy.   Hematologic: The patient denies having bruising, bleeding, or progressive fatigue. Respiratory: Patient denies having shortness of breath, cough, sputum production, fever, or dyspnea on exertion. Cardiovascular: The patient denies having leg pain, leg swelling, heart palpitations, chest permit, chest pain, or lightheadedness. The patient denies having dyspnea on exertion. Gastrointestinal: The patient denies having nausea, emesis, or diarrhea. The patient denies having any hematemesis or blood in the stool. Genitourinary: Patient denies having urinary urgency, frequency, or dysuria. The patient denies having blood in the urine. Psychological: The patient denies having symptoms of nervousness, anxiety, depression, or thoughts of harming himself some of this. Skin: Patient denies having skin rashes, skin, ulcerations, or unexplained itching or pruritus. Musculoskeletal: The patient has swelling in the right arm due to progressive lymphedema. She is now wearing a lymphedema sleeve. Anterior chest wall and breasts: She denies muscle or joint aches or pains    Objective:     Visit Vitals    /66    Pulse (!) 101    Temp 98.7 °F (37.1 °C) (Oral)    Wt 110.2 kg (243 lb)    BMI 49.08 kg/m2     ECOG PS=0 Pain score 0/10     Physical Exam:   Gen. Appearance: The patient is in no acute distress. Skin: There is no bruise or rash. HEENT: The exam is unremarkable. Neck: Supple without lymphadenopathy or thyromegaly. Lungs: Clear to auscultation and percussion; there are no wheezes or rhonchi. Heart: Regular rate and rhythm; there are no murmurs, gallops, or rubs. Anterior chest wall. Breasts: There is no evidence of local recurrence of disease. The axilla reveals no palpable axillary lymphadenopathy. Abdomen: Bowel sounds are present and normal.  There is no guarding, tenderness, or hepatosplenomegaly. Extremities: There is no clubbing, cyanosis. 2+ edema to RUE, patient is wearing a compression sleeve.   Neurologic: There are no focal neurologic deficits. Lymphatics: There is no palpable peripheral lymphadenopathy. Musculoskeletal: The patient has full range of motion at all joints. There is no evidence of joint deformity or effusions. The patient has  right arm lymphedema extending from the fingers up to the shoulder area on the right. Psychological/psychiatric: There is no clinical evidence of anxiety, depression, or melancholy. Lab data:      Results for orders placed or performed during the hospital encounter of 04/10/18   CBC WITH 3 PART DIFF     Status: Abnormal   Result Value Ref Range Status    WBC 9.5 4.5 - 13.0 K/uL Final    RBC 5.34 (H) 4.10 - 5.10 M/uL Final    HGB 13.6 12.0 - 16.0 g/dL Final    HCT 41.7 36 - 48 % Final    MCV 78.1 78 - 102 FL Final    MCH 25.5 25.0 - 35.0 PG Final    MCHC 32.6 31 - 37 g/dL Final    RDW 13.4 11.5 - 14.5 % Final    PLATELET 519 473 - 200 K/uL Final    NEUTROPHILS 72 (H) 40 - 70 % Final    MIXED CELLS 6 0.1 - 17 % Final    LYMPHOCYTES 22 14 - 44 % Final    ABS. NEUTROPHILS 6.8 1.8 - 9.5 K/UL Final    ABS. MIXED CELLS 0.6 0.0 - 2.3 K/uL Final    ABS. LYMPHOCYTES 2.1 1.1 - 5.9 K/UL Final     Comment: Test performed at [place] [address] Location. Results Reviewed by Medical Director. DF AUTOMATED   Final           Assessment:     1. Malignant neoplasm of upper-outer quadrant of right female breast, unspecified estrogen receptor status ([facility] [address]    2. Lymphedema of upper extremity following lymphadenectomy    3. Arthritis    4. Muscle spasm        Plan:   Right arm and hand lymphedema:She is currently undergoing lymphedema therapy and continues to wear her new sleeve, glove, and chest garment. I advised her to to avoid lifting  more than 10 pounds using the right arm and hand. She reports that the lymphedema has significantly improved since her last clinic visit. Breast cancer: I have encouraged patient to continue to her monthly breast self-examinations.   At this time I will order a CA 27-29 level, and a comprehensive metabolic panel. The most recent CA 27-29 level was 33.7 u/mL from her clinic visit dated 2017. The most recent mammogram in 2017 was negative for malignancy. Arthritis: Motrin 600 mg every 6 hours will be ordered and she was instructed to use Percocet for severe pain. Muscle Spasms: Rx for Flexeril 5mg was ordered with instructions to take 1 TID as needed. I will have the patient return to the clinic again in 4 months. Orders Placed This Encounter    COMPLETE CBC & AUTO DIFF WBC    InHouse CBC ([facility])     Standing Status:   Future     Number of Occurrences:   1     Standing Expiration Date:   3/70/5734    METABOLIC PANEL, COMPREHENSIVE     Standing Status:   Future     Standing Expiration Date:   2019    CA 27.29     Standing Status:   Future     Standing Expiration Date:   2019    letrozole [facility]) 2.5 mg tablet     Sig: Take 1 Tab by mouth daily. Dispense:  90 Tab     Refill:  3    oxyCODONE-acetaminophen (PERCOCET) 5-325 mg per tablet     Si or 2 tablets by mouth every 4 hours as needed     Dispense:  120 Tab     Refill:  0    ibuprofen (MOTRIN) 600 mg tablet     Sig: Take 1 Tab by mouth every six (6) hours as needed for Pain.      Dispense:  120 Tab     Refill:  0       Charlene Gaston MD  4/10/2018

## 2018-04-10 NOTE — PATIENT INSTRUCTIONS
Breast Cancer: Care Instructions  Your Care Instructions    Breast cancer occurs when abnormal cells grow out of control in the breast. These cancer cells can spread within the breast, to nearby lymph nodes and other tissues, and to other parts of the body. Being treated for cancer can weaken your body, and you may feel very tired. Get the rest your body needs so you can feel better. Finding out that you have cancer is scary. You may feel many emotions and may need some help coping. Seek out family, friends, and counselors for support. You also can do things at home to make yourself feel better while you go through treatment. Call the [employer] ([phone]) or visit its website at [employer] for more information. Follow-up care is a key part of your treatment and safety. Be sure to make and go to all appointments, and call your doctor if you are having problems. It's also a good idea to know your test results and keep a list of the medicines you take. How can you care for yourself at home? · Take your medicines exactly as prescribed. Call your doctor if you think you are having a problem with your medicine. You may get medicine for nausea and vomiting if you have these side effects. · Follow your doctor's instructions to relieve pain. Pain from cancer and surgery can almost always be controlled. Use pain medicine when you first notice pain, before it becomes severe. · Eat healthy food. If you do not feel like eating, try to eat food that has protein and extra calories to keep up your strength and prevent weight loss. Drink liquid meal replacements for extra calories and protein. Try to eat your main meal early. · Get some physical activity every day, but do not get too tired. Keep doing the hobbies you enjoy as your energy allows. · Do not smoke. Smoking can make your cancer worse. If you need help quitting, talk to your doctor about stop-smoking programs and medicines.  These can increase your chances of quitting for good. · Take steps to control your stress and workload. Learn relaxation techniques. ¨ Share your feelings. Stress and tension affect our emotions. By expressing your feelings to others, you may be able to understand and cope with them. ¨ Consider joining a support group. Talking about a problem with your spouse, a good friend, or other people with similar problems is a good way to reduce tension and stress. ¨ Express yourself through art. Try writing, crafts, dance, or art to relieve stress. Some dance, writing, or art groups may be available just for people who have cancer. ¨ Be kind to your body and mind. Getting enough sleep, eating a healthy diet, and taking time to do things you enjoy can contribute to an overall feeling of balance in your life and can help reduce stress. ¨ Get help if you need it. Discuss your concerns with your doctor or counselor. · If you are vomiting or have diarrhea:  ¨ Drink plenty of fluids (enough so that your urine is light yellow or clear like water) to prevent dehydration. Choose water and other caffeine-free clear liquids. If you have kidney, heart, or liver disease and have to limit fluids, talk with your doctor before you increase the amount of fluids you drink. ¨ When you are able to eat, try clear soups, mild foods, and liquids until all symptoms are gone for 12 to 48 hours. Other good choices include dry toast, crackers, cooked cereal, and gelatin dessert, such as Jell-O.  · If you have not already done so, prepare a list of advance directives. Advance directives are instructions to your doctor and family members about what kind of care you want if you become unable to speak or express yourself. When should you call for help? Call 911 anytime you think you may need emergency care. For example, call if:  ? · You passed out (lost consciousness). ?Call your doctor now or seek immediate medical care if:  ? · You have a fever. ? · You have abnormal bleeding. ? · You think you have an infection. ? · You have new or worse pain. ? · You have new symptoms, such as a cough, belly pain, vomiting, diarrhea, or a rash. ? Watch closely for changes in your health, and be sure to contact your doctor if:  ? · You are much more tired than usual.   ? · You have swollen glands in your armpits, groin, or neck. ? · You do not get better as expected. Where can you learn more? Go to http://alexsandra-rachel.info/. Enter V321 in the search box to learn more about \"Breast Cancer: Care Instructions. \"  Current as of: May 12, 2017  Content Version: 11.4  © 6762-5531 [employer]. Care instructions adapted under license by [employer] (which disclaims liability or warranty for this information). If you have questions about a medical condition or this instruction, always ask your healthcare professional. [address] any warranty or liability for your use of this information.

## 2018-04-10 NOTE — MR AVS SNAPSHOT
[address] [facility] [address] [facility] [address] 
[phone] Patient: Teofilo Mir MRN: [id number] Visit Information Date & Time Provider Department Dept. Phone Encounter #  
 4/10/2018 10:15 AM Flora Thapa [facility] Office [phone] [id number] Follow-up Instructions Return in about 6 months (around 10/10/2018). Your Appointments 9/20/2018 10:00 AM  
Follow Up with [facility] ([facility]) Appt Note: 18 month fu after study at our lab on 9/7/2018; .  
 [address]  
[phone] [address] [place] [address]  
  
    
 10/9/2018 10:15 AM  
Office Visit with Josue Martinez MD  
[facility] ([facility] Appt Note: OV  
 [facility] [address] [facility] [address]  
[phone]  
  
   
 [facility] 9938 [facility] [address]  
  
    
  
 8/8/2018  2:45 PM  
Any with Elzbieta Pires MD  
[facility]) Appt Note: 6 MONTH FU W. CT PRIOR  
 [address] [facility] [address] [place] [address]  
[phone]  
  
   
 [place] [address] Upcoming Health Maintenance Date Due Hepatitis C Screening 1955 FOOT EXAM Q1 5/2/1963 MICROALBUMIN Q1 5/2/1963 EYE EXAM RETINAL OR DILATED Q1 5/2/1963 Pneumococcal 19-64 Highest Risk (1 of 3 - PCV13) 5/2/1972 DTaP/Tdap/Td series (1 - Tdap) 5/2/1974 PAP AKA CERVICAL CYTOLOGY 5/2/1974 FOBT Q 1 YEAR AGE 50-75 5/2/2003 HEMOGLOBIN A1C Q6M 3/25/2011 LIPID PANEL Q1 9/25/2011 ZOSTER VACCINE AGE 60> 3/2/2013 Influenza Age 5 to Adult 8/1/2017 Bone Densitometry (Dexa) Screening 5/2/2018 BREAST CANCER SCRN MAMMOGRAM 3/9/2019 Allergies as of 4/10/2018  Review Complete On: 4/10/2018 By: Josue Martinez MD  
  
 Severity Noted Reaction Type Reactions Ampicillin  02/16/2012    Hives Ceftriaxone  11/16/2016    Other (comments) Patient received medication for UTI treatment and immediately developed itching and anxiousness. [place]  06/05/2015    Hives Not allergic anymore, it was only the pesticide Current Immunizations  Reviewed on 7/3/2015 No immunizations on file. Not reviewed this visit You Were Diagnosed With   
  
 Codes Comments Malignant neoplasm of upper-outer quadrant of right female breast, unspecified estrogen receptor status ([facility]    -  Primary ICD-10-CM: C50.411 ICD-9-CM: 174.4 Lymphedema of upper extremity following lymphadenectomy     ICD-10-CM: E89.89, I89.0 ICD-9-CM: 997.99, 419.6 Arthritis     ICD-10-CM: M19.90 ICD-9-CM: 716.90 Muscle spasm     ICD-10-CM: K36.494 ICD-9-CM: 728.85 Vitals BP Pulse Temp Weight(growth percentile) BMI OB Status 107/66 (!) 101 98.7 °F (37.1 °C) (Oral) 243 lb (110.2 kg) 49.08 kg/m2 Hysterectomy Smoking Status Never Smoker BMI and BSA Data Body Mass Index Body Surface Area 49.08 kg/m 2 2.14 m 2 Preferred Pharmacy Pharmacy Name [facility] [address] [place] [address],[address] [place] [address] [phone] Your Updated Medication List  
  
   
This list is accurate as of 4/10/18 11:04 AM.  Always use your most recent med list.  
  
  
  
  
 acetohydroxamic acid 250 mg Tab Commonly known as:  Black & Pink Take 1 Tab by mouth two (2) times daily (after meals). Indications: BACTERIAL URINARY TRACT INFECTION, STRUVITE RENAL CALCULI  
  
 benzonatate 100 mg capsule Commonly known as:  TESSALON Take 100 mg by mouth three (3) times daily as needed for Cough. Cholecalciferol (Vitamin D3) 50,000 unit Cap Take  by mouth every seven (7) days. cyclobenzaprine 5 mg tablet Commonly known as:  FLEXERIL  
 Take 1 Tab by mouth two (2) times daily as needed for Muscle Spasm(s). Indications: MUSCLE SPASM  
  
 guaiFENesin 100 mg/5 mL liquid Commonly known as:  ROBITUSSIN Take 200 mg by mouth three (3) times daily as needed for Cough. HumaLOG KwikPen Insulin 100 unit/mL kwikpen Generic drug:  insulin lispro 10 Units by SubCUTAneous route Before breakfast, lunch, dinner and at bedtime. ibuprofen 600 mg tablet Commonly known as:  MOTRIN Take 1 Tab by mouth every six (6) hours as needed for Pain. letrozole 2.5 mg tablet Commonly known as:  [facility] Take 1 Tab by mouth daily. LEVOTHYROXINE PO Take 0.88 mg by mouth once. losartan 50 mg tablet Commonly known as:  COZAAR  
daily. METANX 3-35-2 mg Tab tab Generic drug:  L-Mfolate-B6 Phos-Methyl-B12 Take 2.8 mg by mouth.  
  
 multivitamin tablet Commonly known as:  ONE A DAY Take 1 Tab by mouth daily. oxyCODONE-acetaminophen 5-325 mg per tablet Commonly known as:  PERCOCET  
1 or 2 tablets by mouth every 4 hours as needed  
  
 temazepam 15 mg capsule Commonly known as:  RESTORIL Take 1 tab po Q HS may, repeat in 1 hour if needed VITAMIN C 500 mg tablet Generic drug:  ascorbic acid (vitamin C) Take  by mouth. ZyrTEC 10 mg tablet Generic drug:  cetirizine Take  by mouth daily. Prescriptions Printed Refills  
 oxyCODONE-acetaminophen (PERCOCET) 5-325 mg per tablet 0 Si or 2 tablets by mouth every 4 hours as needed Class: Print Prescriptions Sent to Pharmacy Refills  
 letrozole (FEMARA) 2.5 mg tablet 3 Sig: Take 1 Tab by mouth daily. Class: Normal  
 [facility] [address] #: [phone] Route: Oral  
 ibuprofen (MOTRIN) 600 mg tablet 0 Sig: Take 1 Tab by mouth every six (6) hours as needed for Pain.   
 Class: Normal  
 Pharmacy: [facility] [address] #: [phone] Route: Oral  
  
We Performed the Following CA 27.29 [02836 CPT(R)] COMPLETE CBC & AUTO DIFF WBC [45643 CPT(R)] METABOLIC PANEL, COMPREHENSIVE [50075 CPT(R)] Follow-up Instructions Return in about 6 months (around 10/10/2018). To-Do List   
 04/10/2018 Lab:  CBC WITH 3 PART DIFF Patient Instructions Breast Cancer: Care Instructions Your Care Instructions Breast cancer occurs when abnormal cells grow out of control in the breast. These cancer cells can spread within the breast, to nearby lymph nodes and other tissues, and to other parts of the body. Being treated for cancer can weaken your body, and you may feel very tired. Get the rest your body needs so you can feel better. Finding out that you have cancer is scary. You may feel many emotions and may need some help coping. Seek out family, friends, and counselors for support. You also can do things at home to make yourself feel better while you go through treatment. Call the [employer] (5-853.170.3852) or visit its website at [address] for more information. Follow-up care is a key part of your treatment and safety. Be sure to make and go to all appointments, and call your doctor if you are having problems. It's also a good idea to know your test results and keep a list of the medicines you take. How can you care for yourself at home? · Take your medicines exactly as prescribed. Call your doctor if you think you are having a problem with your medicine. You may get medicine for nausea and vomiting if you have these side effects. · Follow your doctor's instructions to relieve pain. Pain from cancer and surgery can almost always be controlled. Use pain medicine when you first notice pain, before it becomes severe. · Eat healthy food.  If you do not feel like eating, try to eat food that has protein and extra calories to keep up your strength and prevent weight loss. Drink liquid meal replacements for extra calories and protein. Try to eat your main meal early. · Get some physical activity every day, but do not get too tired. Keep doing the hobbies you enjoy as your energy allows. · Do not smoke. Smoking can make your cancer worse. If you need help quitting, talk to your doctor about stop-smoking programs and medicines. These can increase your chances of quitting for good. · Take steps to control your stress and workload. Learn relaxation techniques. ¨ Share your feelings. Stress and tension affect our emotions. By expressing your feelings to others, you may be able to understand and cope with them. ¨ Consider joining a support group. Talking about a problem with your spouse, a good friend, or other people with similar problems is a good way to reduce tension and stress. ¨ Express yourself through art. Try writing, crafts, dance, or art to relieve stress. Some dance, writing, or art groups may be available just for people who have cancer. ¨ Be kind to your body and mind. Getting enough sleep, eating a healthy diet, and taking time to do things you enjoy can contribute to an overall feeling of balance in your life and can help reduce stress. ¨ Get help if you need it. Discuss your concerns with your doctor or counselor. · If you are vomiting or have diarrhea: ¨ Drink plenty of fluids (enough so that your urine is light yellow or clear like water) to prevent dehydration. Choose water and other caffeine-free clear liquids. If you have kidney, heart, or liver disease and have to limit fluids, talk with your doctor before you increase the amount of fluids you drink. ¨ When you are able to eat, try clear soups, mild foods, and liquids until all symptoms are gone for 12 to 48 hours. Other good choices include dry toast, crackers, cooked cereal, and gelatin dessert, such as Jell-O. · If you have not already done so, prepare a list of advance directives. Advance directives are instructions to your doctor and family members about what kind of care you want if you become unable to speak or express yourself. When should you call for help? Call 911 anytime you think you may need emergency care. For example, call if: 
? · You passed out (lost consciousness). ?Call your doctor now or seek immediate medical care if: 
? · You have a fever. ? · You have abnormal bleeding. ? · You think you have an infection. ? · You have new or worse pain. ? · You have new symptoms, such as a cough, belly pain, vomiting, diarrhea, or a rash. ? Watch closely for changes in your health, and be sure to contact your doctor if: 
? · You are much more tired than usual.  
? · You have swollen glands in your armpits, groin, or neck. ? · You do not get better as expected. Where can you learn more? Go to [email or web address]/. Enter V321 in the search box to learn more about \"Breast Cancer: Care Instructions. \" Current as of: May 12, 2017 Content Version: 11.4 © 1941-0380 [employer]. Care instructions adapted under license by [employer] (which disclaims liability or warranty for this information). If you have questions about a medical condition or this instruction, always ask your healthcare professional. [place] [address] any warranty or liability for your use of this information. Introducing hospitals & HEALTH SERVICES! Lillie Ulloa introduces [employer] patient portal. Now you can access parts of your medical record, email your doctor's office, and request medication refills online. 1. In your internet browser, go to [email or web address] 2. Click on the First Time User? Click Here link in the Sign In box. You will see the New Member Sign Up page. 3. Enter your [employer] Access Code exactly as it appears below.  You will not need to use this code after youve completed the sign-up process. If you do not sign up before the expiration date, you must request a new code. · [email or web address] Access Code: [email or web address] Expires: 7/9/2018 11:03 AM 
 
4. Enter the last four digits of your Social Security Number (xxxx) and Date of Birth (mm/dd/yyyy) as indicated and click Submit. You will be taken to the next sign-up page. 5. Create a [email or web address] ID. This will be your [email or web address] login ID and cannot be changed, so think of one that is secure and easy to remember. 6. Create a [email or web address] password. You can change your password at any time. 7. Enter your Password Reset Question and Answer. This can be used at a later time if you forget your password. 8. Enter your e-mail address. You will receive e-mail notification when new information is available in [address]. 9. Click Sign Up. You can now view and download portions of your medical record. 10. Click the Download Summary menu link to download a portable copy of your medical information. If you have questions, please visit the Frequently Asked Questions section of the [email or web address] website. Remember, [email or web address] is NOT to be used for urgent needs. For medical emergencies, dial 911. Now available from your iPhone and Android! Please provide this summary of care documentation to your next provider. Your primary care clinician is listed as Margaux Cagle. If you have any questions after today's visit, please call [phone].

## 2018-04-11 LAB
ALBUMIN SERPL-MCNC: 3.7 G/DL (ref 3.6–4.8)
ALBUMIN/GLOB SERPL: 1 {RATIO} (ref 1.2–2.2)
ALP SERPL-CCNC: 127 IU/L (ref 39–117)
ALT SERPL-CCNC: 10 IU/L (ref 0–32)
AST SERPL-CCNC: 10 IU/L (ref 0–40)
BILIRUB SERPL-MCNC: 0.5 MG/DL (ref 0–1.2)
BUN SERPL-MCNC: 24 MG/DL (ref 8–27)
BUN/CREAT SERPL: 14 (ref 12–28)
CALCIUM SERPL-MCNC: 9.1 MG/DL (ref 8.7–10.3)
CANCER AG27-29 SERPL-ACNC: 34.8 U/ML (ref 0–38.6)
CHLORIDE SERPL-SCNC: 100 MMOL/L (ref 96–106)
CO2 SERPL-SCNC: 20 MMOL/L (ref 18–29)
CREAT SERPL-MCNC: 1.72 MG/DL (ref 0.57–1)
GFR SERPLBLD CREATININE-BSD FMLA CKD-EPI: 31 ML/MIN/1.73
GFR SERPLBLD CREATININE-BSD FMLA CKD-EPI: 36 ML/MIN/1.73
GLOBULIN SER CALC-MCNC: 3.6 G/DL (ref 1.5–4.5)
GLUCOSE SERPL-MCNC: 327 MG/DL (ref 65–99)
POTASSIUM SERPL-SCNC: 5 MMOL/L (ref 3.5–5.2)
PROT SERPL-MCNC: 7.3 G/DL (ref 6–8.5)
SODIUM SERPL-SCNC: 136 MMOL/L (ref 134–144)

## 2018-05-04 ENCOUNTER — HOSPITAL ENCOUNTER (OUTPATIENT)
Dept: MAMMOGRAPHY | Age: 63
Discharge: HOME | End: 2018-05-04
Attending: INTERNAL MEDICINE
Payer: COMMERCIAL

## 2018-05-04 DIAGNOSIS — Z12.39 BREAST CANCER SCREENING: ICD-10-CM

## 2018-05-04 PROCEDURE — 77063 BREAST TOMOSYNTHESIS BI: CPT

## 2018-09-20 ENCOUNTER — OFFICE VISIT (OUTPATIENT)
Dept: VASCULAR SURGERY | Age: 63
End: 2018-09-20

## 2018-09-20 VITALS
HEART RATE: 86 BPM | BODY MASS INDEX: 47.78 KG/M2 | SYSTOLIC BLOOD PRESSURE: 130 MMHG | HEIGHT: 59 IN | DIASTOLIC BLOOD PRESSURE: 88 MMHG | RESPIRATION RATE: 16 BRPM | WEIGHT: 237 LBS

## 2018-09-20 DIAGNOSIS — I65.23 CAROTID STENOSIS, ASYMPTOMATIC, BILATERAL: Primary | ICD-10-CM

## 2018-09-20 PROBLEM — N12 PYELONEPHRITIS: Status: ACTIVE | Noted: 2018-07-10

## 2018-09-20 PROBLEM — N20.1 URETERAL CALCULUS, RIGHT: Status: ACTIVE | Noted: 2018-07-10

## 2018-09-20 PROBLEM — N13.9 OBSTRUCTIVE UROPATHY: Status: ACTIVE | Noted: 2018-07-10

## 2018-09-20 PROBLEM — T83.098A MALFUNCTION OF NEPHROSTOMY TUBE (HCC): Status: ACTIVE | Noted: 2018-08-03

## 2018-09-20 PROBLEM — E11.21 TYPE 2 DIABETES WITH NEPHROPATHY (HCC): Status: ACTIVE | Noted: 2018-09-20

## 2018-09-20 NOTE — MR AVS SNAPSHOT
[address] [facility] [address] 
[phone] Patient: Sarah Balderas MRN: [id number] PJQ:[birth year] Visit Information Date & Time Provider Department Dept. Phone Encounter #  
 9/20/2018 10:00 AM [facility] [phone] Follow-up Instructions Return in about 2 years (around 9/20/2020). Your Appointments 9/20/2018  3:20 PM  
ESTABLISHED PATIENT with Fortunato Fonseca, ROSCOE [facility] [address] ([address] [place] [address]) Appt Note: 2 week post op JSL  
 [address] [address]  
  
    
 9/27/2018  1:40 PM  
ESTABLISHED PATIENT with Fortunato Fonseca NP [facility] [address] ([address] [place] [address]) [address] [place]  
[phone]  
  
    
 10/9/2018 10:15 AM  
Office Visit with Checo Taylor MD  
[address] [place] [address]) Appt Note: OV  
 [address] [place] [address]  
[address] [facility] [address]  
  
    
 9/21/2020 10:00 AM  
PROCEDURE with [facility] 1 [facility] ([address] [place] [address]) Appt Note: cv knaak 2 years [address] [facility] [address] [facility]  
[phone] [address] [facility] [address] [facility]  
  
    
 10/6/2020 10:00 AM  
Follow Up with [facility] ([address] [place] [address]) [address] [facility] [phone] [facility]  
[phone] 2300 [facility] 200 [facility] Upcoming Health Maintenance Date Due Hepatitis C Screening 1955 FOOT EXAM Q1 5/2/1963 MICROALBUMIN Q1 5/2/1963 EYE EXAM RETINAL OR DILATED Q1 5/2/1963 DTaP/Tdap/Td series (1 - Tdap) 5/2/1974 FOBT Q 1 YEAR AGE 50-75 5/2/2003 HEMOGLOBIN A1C Q6M 3/25/2011 LIPID PANEL Q1 9/25/2011 ZOSTER VACCINE AGE 60> 3/2/2013 GLAUCOMA SCREENING Q2Y 5/2/2018 Bone Densitometry (Dexa) Screening 5/2/2018 Pneumococcal 65+ High/Highest Risk (1 of 2 - PCV13) 5/2/2018 Influenza Age 5 to Adult 8/1/2018 BREAST CANCER SCRN MAMMOGRAM 5/4/2020 Allergies as of 9/20/2018  Review Complete On: 9/20/2018 By: Ran Omer LPN Severity Noted Reaction Type Reactions Ampicillin  02/16/2012    Hives Current Immunizations  Reviewed on 8/29/2018 Name Date Influenza Vaccine 10/1/2017 Not reviewed this visit You Were Diagnosed With   
  
 Codes Comments Carotid stenosis, asymptomatic, bilateral    -  Primary ICD-10-CM: S99.15 ICD-9-CM: 433.10, 433.30 Vitals BP Pulse Resp Height(growth percentile) Weight(growth percentile) BMI  
 130/88 (BP 1 Location: Left arm, BP Patient Position: Sitting) 86 16 4' 11\" (1.499 m) 237 lb (107.5 kg) 47.87 kg/m2 OB Status Smoking Status Hysterectomy Never Smoker Vitals History BMI and BSA Data Body Mass Index Body Surface Area  
 47.87 kg/m 2 2.12 m 2 Preferred Pharmacy Pharmacy Name [facility] [place] [address],[facility] [address] [place] [address] [phone] Your Updated Medication List  
  
   
This list is accurate as of 9/20/18 10:49 AM.  Always use your most recent med list.  
  
  
  
  
 * cefdinir 300 mg capsule Commonly known as:  OMNICEF Take 1 Cap by mouth two (2) times a day. * cefdinir 300 mg capsule Commonly known as:  OMNICEF Take 1 Cap by mouth two (2) times a day. HumaLOG U-100 Insulin 100 unit/mL injection Generic drug:  insulin lispro  
100 Units by SubCUTAneous route daily. Pt has insulin pump  
  
 ibuprofen 600 mg tablet Commonly known as:  MOTRIN  
 Take 1 Tab by mouth every six (6) hours as needed for Pain. INTRAROSA 6.5 mg Inst  
Generic drug:  prasterone (dhea) 1 Dose Once a Day. letrozole 2.5 mg tablet Commonly known as:  FEMARA  
2.5 mg.  
  
 levothyroxine 88 mcg tablet Commonly known as:  SYNTHROID Take 88 mcg by mouth daily. losartan 100 mg tablet Commonly known as:  COZAAR Take 100 mg by mouth daily. Indications: takes in the evening  
  
 multivitamin tablet Commonly known as:  ONE A DAY Take 1 Tab by mouth daily. * oxyCODONE-acetaminophen 5-325 mg per tablet Commonly known as:  PERCOCET Take 1 Tab by mouth every four (4) hours as needed for Pain. Max Daily Amount: 6 Tabs. Indications: Pain * oxyCODONE-acetaminophen 5-325 mg per tablet Commonly known as:  PERCOCET Take 1 Tab by mouth every six (6) hours as needed for Pain. Max Daily Amount: 4 Tabs. Indications: Pain * Notice: This list has 4 medication(s) that are the same as other medications prescribed for you. Read the directions carefully, and ask your doctor or other care provider to review them with you. Follow-up Instructions Return in about 2 years (around 9/20/2020). To-Do List   
 09/17/2020 Vascular/US:  DUPLEX CAROTID BILATERAL Introducing [facility]! [facility] introduces [employer] patient portal. Now you can access parts of your medical record, email your doctor's office, and request medication refills online. 1. In your internet browser, go to [email or web address] 2. Click on the First Time User? Click Here link in the Sign In box. You will see the New Member Sign Up page. 3. Enter your [employer] Access Code exactly as it appears below. You will not need to use this code after youve completed the sign-up process. If you do not sign up before the expiration date, you must request a new code. · [employer] Access Code: [email or web address] Expires: 10/17/2018  5:23 AM 
 
 4. Enter the last four digits of your Social Security Number (xxxx) and Date of Birth (mm/dd/yyyy) as indicated and click Submit. You will be taken to the next sign-up page. 5. Create a VisConPro ID. This will be your [employer] login ID and cannot be changed, so think of one that is secure and easy to remember. 6. Create a VisConPro password. You can change your password at any time. 7. Enter your Password Reset Question and Answer. This can be used at a later time if you forget your password. 8. Enter your e-mail address. You will receive e-mail notification when new information is available in [address] 9. Click Sign Up. You can now view and download portions of your medical record. 10. Click the Download Summary menu link to download a portable copy of your medical information. If you have questions, please visit the Frequently Asked Questions section of the [employer] website. Remember, [employer] is NOT to be used for urgent needs. For medical emergencies, dial 911. Now available from your iPhone and Android! Please provide this summary of care documentation to your next provider. Your primary care clinician is listed as Efrain Levin. If you have any questions after today's visit, please call [phone].

## 2018-09-20 NOTE — PROGRESS NOTES
Marco A Burgess    Chief Complaint   Patient presents with    Carotid Artery Stenosis       History and Physical    Ms Arelis Chavez had been referred early 2017 by her OB/GYN, concerned for a carotid bruit auscultated on a physical exam  She was referred here for evaluation and Dr Lyn Gave ordered carotid duplex   She had no significant stenosis noted so only needed surveillance between every 1-2 years, and she is here for that follow up today   She has well controlled diabetes. She also takes daily baby aspirin    Past Medical History:   Diagnosis Date    Arthritis     Arthropathy, unspecified, site unspecified     Asthma     Mild.  Breast CA ([address] 2/28/2014    Right Breast    Chemotherapy convalescence or palliative care     resolved    Diabetes mellitus     Family history of breast cancer     GERD (gastroesophageal reflux disease)     Hematuria     Hiatus hernia syndrome     resolved, per pt.  8-6-18    Hypertension     Kidney stone     Lymphedema     right arm    Morbid obesity (HCC)     Nausea with vomiting     Postop N/V    Radiation therapy complication     Recurrent UTI     Renal colic     S/P breast biopsy - right breast calcifications 10/18/10     Shortness of breath     With exertion.  8-6-18  pt denies     Staghorn calculus     Struvite kidney stones     Thyroid dysfunction     GOITER    Urethral diverticulum     Urine leukocytes     UTI (lower urinary tract infection)      Patient Active Problem List   Diagnosis Code    Family history of breast cancer Z80.3    S/P breast biopsy - right breast calcifications 10/18/10 Z98.890    DM (diabetes mellitus) ([address] E11.9    Breast CA ([address] C50.919    Skin infection L08.9    Lymphedema of upper extremity following lymphadenectomy E89.89, I89.0    Insomnia G47.00    Arthritis M19.90    Personal history of malignant neoplasm of breast Z85.3    Malignant neoplasm of upper-outer quadrant of right female breast ([address] C50.411    Muscle spasm M62.838    Obesity, morbid (HCC) E66.01    Type 2 diabetes mellitus with nephropathy ([facility] E11.21     Past Surgical History:   Procedure Laterality Date    HX BREAST BIOPSY  10/18/10    stereotactic - right breast calcifications    HX BREAST BIOPSY  2/14/2014    RIGHT BREAST BIOPSY performed by Arslan Ray MD at [address] [place] [address] CYST REMOVAL  1997    Bilateral breast.     HX GYN      urethra reconstruction x3    HX HYSTERECTOMY      HX MODIFIED RADICAL MASTECTOMY  3/21/2014    RIGHT MODIFIED RADICAL MASTECTOMY SENTINAL LYMPH NODE BIOPSY performed by Arslan Ray MD at [facility] MAIN OR    HX TUBAL LIGATION      HX UROLOGICAL      4 diverticuli repaired    HX UROLOGICAL  03/29/2011    Cysto, percutaneous nephrolithotomy, antegrade URS, antegrade pyelogram.  Dr. Uvaldo Bob, [facility].    HX UROLOGICAL  12/11/2009    Cysto, flexible URS, right RPG, lasertripsy, JJ stent placement. Dr. Uvaldo Bob, [facility].    HX UROLOGICAL  11/20/2009    Second look right attempted percutaneous nephrolithotomy. Dr. Uvaldo Bob, [facility].    HX UROLOGICAL  10/23/2009    Cysto, right percutaneous nephrolithotomy. Dr. Uvaldo Bob, [facility].    HX UROLOGICAL  08/07/2018    Cysto, bilat RPG, right URS, HLL, right JJ stent placement, right nephrostomy tube removal, Dr. Breezy Rashid, [facility] VASCULAR ACCESS  2014     Current Outpatient Prescriptions   Medication Sig Dispense Refill    oxyCODONE-acetaminophen (PERCOCET) 5-325 mg per tablet Take 1 Tab by mouth every six (6) hours as needed for Pain. Max Daily Amount: 4 Tabs. Indications: Pain 12 Tab 0    cefdinir (OMNICEF) 300 mg capsule Take 1 Cap by mouth two (2) times a day. 10 Cap 0    cefdinir (OMNICEF) 300 mg capsule Take 1 Cap by mouth two (2) times a day. 14 Cap 0    HUMALOG U-100 INSULIN 100 unit/mL injection 100 Units by SubCUTAneous route daily. Pt has insulin pump      oxyCODONE-acetaminophen (PERCOCET) 5-325 mg per tablet Take 1 Tab by mouth every four (4) hours as needed for Pain.  Max Daily Amount: 6 Tabs. Indications: Pain 20 Tab 0    letrozole (FEMARA) 2.5 mg tablet 2.5 mg.      losartan (COZAAR) 100 mg tablet Take 100 mg by mouth daily. Indications: takes in the evening      levothyroxine (SYNTHROID) 88 mcg tablet Take 88 mcg by mouth daily.  prasterone, dhea, (INTRAROSA) 6.5 mg inst 1 Dose Once a Day.  ibuprofen (MOTRIN) 600 mg tablet Take 1 Tab by mouth every six (6) hours as needed for Pain. 120 Tab 0    multivitamin (ONE A DAY) tablet Take 1 Tab by mouth daily. Allergies   Allergen Reactions    Ampicillin Hives       Review of Systems    Review of Systems - History obtained from the patient  General ROS: negative  Psychological ROS: negative  Ophthalmic ROS: negative  Respiratory ROS: negative  Cardiovascular ROS: negative  Gastrointestinal ROS: negative  Musculoskeletal ROS: negative  Neurological ROS: negative  Dermatological ROS: negative  Vascular ROS: right arm edema     Physical   Visit Vitals    /88 (BP 1 Location: Left arm, BP Patient Position: Sitting)    Pulse 86    Resp 16    Ht 4' 11\" (1.499 m)    Wt 237 lb (107.5 kg)    BMI 47.87 kg/m2     General:  Alert, cooperative, no distress. Head:  Normocephalic, without obvious abnormality, atraumatic. Eyes:    Conjunctivae/corneas clear. Pupils equal, round, reactive to light. Extraocular movements intact. Neck:         No obvious  Bruit noted today    Lungs:   Clear to auscultation bilaterally. Heart:  Regular rate and rhythm, S1, S2 normal   Extremities: Extremities normal, atraumatic, no cyanosis, right arm lymphedema   Pulses: 2+ and symmetric all extremities. Skin: Skin color, texture, turgor normal. No rashes or lesions. Vascular studies:  Mild stenosis of bilateral carotid arteries. Normal bilateral vertebral arteries. The exam was compared to the study performed on 2/28/2017. No significant changes. Impression/Plan:     ICD-10-CM ICD-9-CM    1.  Carotid stenosis, asymptomatic, bilateral I65.23 433.10 DUPLEX CAROTID BILATERAL     433.30      No orders of the defined types were placed in this encounter. Reassured of the mild nature of her results and that with good risk factor control, ok to monitor every 2 years    Follow-up Disposition:  Return in about 2 years (around 9/20/2020). Nevin Lennox, PA    Portions of this note have been entered using voice recognition software.

## 2018-09-20 NOTE — PROGRESS NOTES
1. Have you been to an emergency room or urgent care clinic since your last visit? No  Hospitalized since your last visit? If yes, where, when, and reason for visit? No  2. Have you seen or consulted any other health care providers outside of the [facility] since your last visit including any procedures, health maintenance items. If yes, where, when and reason for visit?

## 2018-10-09 ENCOUNTER — OFFICE VISIT (OUTPATIENT)
Dept: ONCOLOGY | Age: 63
End: 2018-10-09

## 2018-10-09 ENCOUNTER — HOSPITAL ENCOUNTER (OUTPATIENT)
Dept: ONCOLOGY | Age: 63
Discharge: HOME | End: 2018-10-09

## 2018-10-09 VITALS
HEIGHT: 59 IN | RESPIRATION RATE: 16 BRPM | DIASTOLIC BLOOD PRESSURE: 65 MMHG | TEMPERATURE: 98.2 F | BODY MASS INDEX: 48.79 KG/M2 | OXYGEN SATURATION: 100 % | WEIGHT: 242 LBS | SYSTOLIC BLOOD PRESSURE: 121 MMHG | HEART RATE: 81 BPM

## 2018-10-09 DIAGNOSIS — C50.411 MALIGNANT NEOPLASM OF UPPER-OUTER QUADRANT OF RIGHT FEMALE BREAST, UNSPECIFIED ESTROGEN RECEPTOR STATUS (HCC): ICD-10-CM

## 2018-10-09 DIAGNOSIS — F51.01 PRIMARY INSOMNIA: ICD-10-CM

## 2018-10-09 DIAGNOSIS — M62.838 MUSCLE SPASM: ICD-10-CM

## 2018-10-09 DIAGNOSIS — M19.90 ARTHRITIS: ICD-10-CM

## 2018-10-09 DIAGNOSIS — C50.411 MALIGNANT NEOPLASM OF UPPER-OUTER QUADRANT OF RIGHT FEMALE BREAST, UNSPECIFIED ESTROGEN RECEPTOR STATUS (HCC): Primary | ICD-10-CM

## 2018-10-09 DIAGNOSIS — I89.0 LYMPHEDEMA OF UPPER EXTREMITY FOLLOWING LYMPHADENECTOMY: ICD-10-CM

## 2018-10-09 DIAGNOSIS — E89.89 LYMPHEDEMA OF UPPER EXTREMITY FOLLOWING LYMPHADENECTOMY: ICD-10-CM

## 2018-10-09 LAB
BASO+EOS+MONOS # BLD AUTO: 0.6 K/UL (ref 0–2.3)
BASO+EOS+MONOS # BLD AUTO: 7 % (ref 0.1–17)
DIFFERENTIAL METHOD BLD: ABNORMAL
ERYTHROCYTE [DISTWIDTH] IN BLOOD BY AUTOMATED COUNT: 14 % (ref 11.5–14.5)
HCT VFR BLD AUTO: 38.3 % (ref 36–48)
HGB BLD-MCNC: 12.3 G/DL (ref 12–16)
LYMPHOCYTES # BLD: 2.2 K/UL (ref 1.1–5.9)
LYMPHOCYTES NFR BLD: 27 % (ref 14–44)
MCH RBC QN AUTO: 24.8 PG (ref 25–35)
MCHC RBC AUTO-ENTMCNC: 32.1 G/DL (ref 31–37)
MCV RBC AUTO: 77.2 FL (ref 78–102)
NEUTS SEG # BLD: 5.5 K/UL (ref 1.8–9.5)
NEUTS SEG NFR BLD: 66 % (ref 40–70)
PLATELET # BLD AUTO: 337 K/UL (ref 140–440)
RBC # BLD AUTO: 4.96 M/UL (ref 4.1–5.1)
WBC # BLD AUTO: 8.3 K/UL (ref 4.5–13)

## 2018-10-09 RX ORDER — DENOSUMAB 60 MG/ML
60 INJECTION SUBCUTANEOUS
COMMUNITY
Start: 2018-09-26

## 2018-10-09 NOTE — PROGRESS NOTES
Hematology/Oncology  Progress Note    Name: Ramy Wynn  Date: 10/9/2018  : 1955      Ms. Alina Tran is a 70year old female who was seen for management of her invasive ductal adenocarcinoma right and anemia. Current therapy: Femara 2.5 mg daily and oral iron supplementation daily. Subjective:     Ms. Alina Tran is a 68-year-old woman who is currently taking Femara 2.5 mg daily as long-term management for her invasive ductal carcinoma of the breast.  She continues to tolerate the medication well. The patient reports that she is doing her breast self-exam on a monthly basis. Additionally, she continues to take the iron supplement once daily. She reports she is currently undergoing therapy for lymphedema to the [facility] and has some improvement in pain and swelling. She is wearing her new sleeve and compression garment. She has no complaints of pain at this time. She denies CP or SOB. The patient reports her appetite is good. Her most recent mammogram was done in 2017 and there was no detectable abnormality. Past medical history, family history, and social history were reviewed and remain unchanged. Past Medical History:   Diagnosis Date    Arthritis     Arthropathy, unspecified, site unspecified     Asthma     Mild.  Breast CA ([address] 2014    Right Breast    Chemotherapy convalescence or palliative care     resolved    Diabetes mellitus     Family history of breast cancer     GERD (gastroesophageal reflux disease)     Hematuria     Hiatus hernia syndrome     resolved, per pt.  18    Hypertension     Kidney stone     Lymphedema     right arm    Microhematuria     Morbid obesity (HCC)     Nausea with vomiting     Postop N/V    Radiation therapy complication     Recurrent UTI     Renal colic     S/P breast biopsy - right breast calcifications 10/18/10     Shortness of breath     With exertion.  18  pt denies     Staghorn calculus     Struvite kidney stones     Thyroid dysfunction     GOITER    Urethral diverticulum     Urine leukocytes     UTI (lower urinary tract infection)      Past Surgical History:   Procedure Laterality Date    HX BREAST BIOPSY  10/18/10    stereotactic - right breast calcifications    HX BREAST BIOPSY  2/14/2014    RIGHT BREAST BIOPSY performed by Ratna Persaud MD at [address] [place] [address] HX CYST REMOVAL  1997    Bilateral breast.     HX GYN      urethra reconstruction x3    HX HYSTERECTOMY      HX MODIFIED RADICAL MASTECTOMY  3/21/2014    RIGHT MODIFIED RADICAL MASTECTOMY SENTINAL LYMPH NODE BIOPSY performed by Ratna Persaud MD at [facility] MAIN OR    HX TUBAL LIGATION      HX UROLOGICAL      4 diverticuli repaired    HX UROLOGICAL  03/29/2011    Cysto, percutaneous nephrolithotomy, antegrade URS, antegrade pyelogram.  Dr. Pma Wright, [facility].    HX UROLOGICAL  12/11/2009    Cysto, flexible URS, right RPG, lasertripsy, JJ stent placement[facility] Pam [facility].    HX UROLOGICAL  11/20/2009    Second look right attempted percutaneous nephrolithotomy. Dr. Pam Wright, [facility].    HX UROLOGICAL  10/23/2009    Cysto, right percutaneous nephrolithotomy. Dr. Pam Wright, [facility].    HX UROLOGICAL  08/07/2018    Cysto, bilat RPG, right URS, HLL, right JJ stent placement, right nephrostomy tube removal, Dr[facility] J[facility]intron[facility]  2014     Social History     Social History    Marital status: SINGLE     Spouse name: N/A    Number of children: N/A    Years of education: N/A     Occupational History    Not on file.      Social History Main Topics    Smoking status: Never Smoker    Smokeless tobacco: Never Used    Alcohol use 0.5 oz/week     1 Glasses of wine per week      Comment: occasionally    Drug use: No    Sexual activity: Not on file     Other Topics Concern    Not on file     Social History Narrative     Family History   Problem Relation Age of Onset    Breast Cancer Sister 79    Diabetes Mother     Hypertension Mother     Breast Cancer Sister 39    Hypertension Other      Parent    Diabetes Other      parent    Stroke Other      parent    Hypertension Other      sibling    Diabetes Other      sibling    Osteoporosis Other      sibling     Current Outpatient Prescriptions   Medication Sig Dispense Refill    PROLIA 60 mg/mL injection       cefdinir (OMNICEF) 300 mg capsule Take 1 Cap by mouth two (2) times a day. 14 Cap 0    oxyCODONE-acetaminophen (PERCOCET) 5-325 mg per tablet Take 1 Tab by mouth every six (6) hours as needed for Pain. Max Daily Amount: 4 Tabs. Indications: Pain 12 Tab 0    HUMALOG U-100 INSULIN 100 unit/mL injection 100 Units by SubCUTAneous route daily. Pt has insulin pump      letrozole (FEMARA) 2.5 mg tablet 2.5 mg.      losartan (COZAAR) 100 mg tablet Take 100 mg by mouth daily. Indications: takes in the evening      levothyroxine (SYNTHROID) 88 mcg tablet Take 88 mcg by mouth daily.  prasterone, dhea, (INTRAROSA) 6.5 mg inst 1 Dose Once a Day.  ibuprofen (MOTRIN) 600 mg tablet Take 1 Tab by mouth every six (6) hours as needed for Pain. 120 Tab 0    multivitamin (ONE A DAY) tablet Take 1 Tab by mouth daily. Review of Systems  Constitutional: The patient denies acute distress or discomfort  HEENT: The patient denies recent head trauma, eye pain, blurred vision,  hearing deficit, oropharyngeal mucosal pain or lesions, and the patient denies throat pain or discomfort. Lymphatics: The patient denies palpable peripheral lymphadenopathy. Hematologic: The patient denies having bruising, bleeding, or progressive fatigue. Respiratory: Patient denies having shortness of breath, cough, sputum production, fever, or dyspnea on exertion. Cardiovascular: The patient denies having leg pain, leg swelling, heart palpitations, chest permit, chest pain, or lightheadedness. The patient denies having dyspnea on exertion. Gastrointestinal: The patient denies having nausea, emesis, or diarrhea.  The patient denies having any hematemesis or blood in the stool. Genitourinary: Patient denies having urinary urgency, frequency, or dysuria. The patient denies having blood in the urine. Psychological: The patient denies having symptoms of nervousness, anxiety, depression, or thoughts of harming himself some of this. Skin: Patient denies having skin rashes, skin, ulcerations, or unexplained itching or pruritus. Musculoskeletal: The patient has swelling in the right arm due to progressive lymphedema. She is now wearing a lymphedema sleeve. Anterior chest wall and breasts: She denies muscle or joint aches or pains    Objective:     Visit Vitals    /65    Pulse 81    Temp 98.2 °F (36.8 °C) (Oral)    Resp 16    Ht 4' 11\" (1.499 m)    Wt 109.8 kg (242 lb)    SpO2 100%    BMI 48.88 kg/m2     ECOG PS=0 Pain score 0/10     Physical Exam:   Gen. Appearance: The patient is in no acute distress. Skin: There is no bruise or rash. HEENT: The exam is unremarkable. Neck: Supple without lymphadenopathy or thyromegaly. Lungs: Clear to auscultation and percussion; there are no wheezes or rhonchi. Heart: Regular rate and rhythm; there are no murmurs, gallops, or rubs. Anterior chest wall. Breasts: There is no evidence of local recurrence of disease. The axilla reveals no palpable axillary lymphadenopathy. Abdomen: Bowel sounds are present and normal.  There is no guarding, tenderness, or hepatosplenomegaly. Extremities: There is no clubbing, cyanosis. 2+ edema to RUE, patient is wearing a compression sleeve. Neurologic: There are no focal neurologic deficits. Lymphatics: There is no palpable peripheral lymphadenopathy. Musculoskeletal: The patient has full range of motion at all joints. There is no evidence of joint deformity or effusions. The patient has  right arm lymphedema extending from the fingers up to the shoulder area on the right.  Psychological/psychiatric: There is no clinical evidence of anxiety, depression, or melancholy. Lab data:      Results for orders placed or performed during the hospital encounter of 10/09/18   CBC WITH 3 PART DIFF     Status: Abnormal   Result Value Ref Range Status    WBC 8.3 4.5 - 13.0 K/uL Final    RBC 4.96 4.10 - 5.10 M/uL Final    HGB 12.3 12.0 - 16.0 g/dL Final    HCT 38.3 36 - 48 % Final    MCV 77.2 (L) 78 - 102 FL Final    MCH 24.8 (L) 25.0 - 35.0 PG Final    MCHC 32.1 31 - 37 g/dL Final    RDW 14.0 11.5 - 14.5 % Final    PLATELET 658 619 - 452 K/uL Final    NEUTROPHILS 66 40 - 70 % Final    MIXED CELLS 7 0.1 - 17 % Final    LYMPHOCYTES 27 14 - 44 % Final    ABS. NEUTROPHILS 5.5 1.8 - 9.5 K/UL Final    ABS. MIXED CELLS 0.6 0.0 - 2.3 K/uL Final    ABS. LYMPHOCYTES 2.2 1.1 - 5.9 K/UL Final     Comment: Test performed at [place] [address] Location. Results Reviewed by Medical Director. DF AUTOMATED   Final           Assessment:     1. Malignant neoplasm of upper-outer quadrant of right female breast, unspecified estrogen receptor status ([facility]    2. Arthritis    3. Lymphedema of upper extremity following lymphadenectomy    4. Muscle spasm    5. Primary insomnia        Plan:   Right arm and hand lymphedema:She is currently undergoing lymphedema therapy and continues to wear her new sleeve, glove, and chest garment. I advised her to to avoid lifting  more than 10 pounds using the right arm and hand. She reports that the lymphedema has significantly improved since her last clinic visit. Breast cancer: I have encouraged patient to continue to her monthly breast self-examinations. At this time I will order a CA 27-29 level, and a comprehensive metabolic panel. The most recent CA 27-29 level was 34.8 units/mL from 4/10/2018. The most recent mammogram in April 2018 was negative for malignancy. Arthritis: Motrin 600 mg every 6 hours will be ordered and she was instructed to use Percocet for severe pain.     Muscle Spasms: Rx for Flexeril 5mg was ordered with instructions to take 1 TID as needed. I will have the patient return to the clinic again in 4 months.   Orders Placed This Encounter    COMPLETE CBC & AUTO DIFF WBC    InHouse CBC ([facility])     Standing Status:   Future     Number of Occurrences:   1     Standing Expiration Date:   43/27/3927    METABOLIC PANEL, COMPREHENSIVE     Standing Status:   Future     Number of Occurrences:   1     Standing Expiration Date:   10/10/2019    CA 27.29     Standing Status:   Future     Number of Occurrences:   1     Standing Expiration Date:   10/10/2019    PROLIA 60 mg/mL injection       Ann-Marie Gordon MD  10/9/2018

## 2018-10-09 NOTE — MR AVS SNAPSHOT
[address] [facility] [address] [facility] [address] 
[phone] Patient: Amanda Osman MRN: [id number] YEY:[birth year] Visit Information Date & Time Provider Department Dept. Phone Encounter #  
 10/9/2018 10:15 AM Christine Basurto MD [address] [facility]  [phone] [id number] Follow-up Instructions Return in about 4 months (around 2/9/2019). Your Appointments 11/12/2018  1:45 PM  
ESTABLISHED PATIENT with Jes Clark MD  
[facility] Appt Note: pre op appt prior to surgery with Dr. Juanpablo Smith on 11-20-18 BMP, CBC and urine culture [address] [place] [address] [phone]  
  
    
 2/5/2019 10:30 AM  
Office Visit with MD Priscila Oseguera [facility] Appt Note: OV  
 [facility] [address] [facility] [address]  
[address] [place] [address]  
  
    
 9/21/2020 10:00 AM  
PROCEDURE with [facility] 1 [facility] ([facility] Appt Note: cv knaak 2 years 12[address] [facility] [address] [facility] [address]  
[phone] 1212 [facility] 47 [facility]  
  
    
 10/6/2020 10:00 AM  
Follow Up with BRIDGET [place] [facility] ([facility] [address] [facility] [address] 200 [facility]  
[phone] 1212 [facility] 200 [facility] Upcoming Health Maintenance Date Due Hepatitis C Screening 1955 FOOT EXAM Q1 5/2/1963 MICROALBUMIN Q1 5/2/1963 EYE EXAM RETINAL OR DILATED Q1 5/2/1963 DTaP/Tdap/Td series (1 - Tdap) 5/2/1974 Shingrix Vaccine Age 50> (1 of 2) 5/2/2003 FOBT Q 1 YEAR AGE 50-75 5/2/2003 HEMOGLOBIN A1C Q6M 3/25/2011 LIPID PANEL Q1 9/25/2011 GLAUCOMA SCREENING Q2Y 5/2/2018 Bone Densitometry (Dexa) Screening 5/2/2018 Pneumococcal 65+ High/Highest Risk (1 of 2 - PCV13) 5/2/2018 Influenza Age 5 to Adult 8/1/2018 BREAST CANCER SCRN MAMMOGRAM 5/4/2020 Allergies as of 10/9/2018  Review Complete On: 10/9/2018 By: Brian Loo MD  
  
 Severity Noted Reaction Type Reactions Ampicillin  02/16/2012    Hives Current Immunizations  Reviewed on 8/29/2018 Name Date Influenza Vaccine 10/1/2017 Not reviewed this visit You Were Diagnosed With   
  
 Codes Comments Malignant neoplasm of upper-outer quadrant of right female breast, unspecified estrogen receptor status ([facility] [address]    -  Primary ICD-10-CM: C50.411 ICD-9-CM: 174.4 Arthritis     ICD-10-CM: M19.90 ICD-9-CM: 716.90 Lymphedema of upper extremity following lymphadenectomy     ICD-10-CM: E89.89, I89.0 ICD-9-CM: 997.99, 383.9 Muscle spasm     ICD-10-CM: S76.108 ICD-9-CM: 728.85 Primary insomnia     ICD-10-CM: F51.01 
ICD-9-CM: 307.42 Vitals BP Pulse Temp Resp Height(growth percentile) Weight(growth percentile) 121/65 81 98.2 °F (36.8 °C) (Oral) 16 4' 11\" (1.499 m) 242 lb (109.8 kg) SpO2 BMI OB Status Smoking Status 100% 48.88 kg/m2 Hysterectomy Never Smoker Vitals History BMI and BSA Data Body Mass Index Body Surface Area  
 48.88 kg/m 2 2.14 m 2 Preferred Pharmacy Pharmacy Name [facility] ([place], [place]) [facility] [address] [place] [address] Ave [phone] Your Updated Medication List  
  
   
This list is accurate as of 10/9/18 11:18 AM.  Always use your most recent med list.  
  
  
  
  
 cefdinir 300 mg capsule Commonly known as:  OMNICEF Take 1 Cap by mouth two (2) times a day. HumaLOG U-100 Insulin 100 unit/mL injection Generic drug:  insulin lispro 100 Units by SubCUTAneous route daily. Pt has insulin pump  
  
 ibuprofen 600 mg tablet Commonly known as:  MOTRIN Take 1 Tab by mouth every six (6) hours as needed for Pain. INTRAROSA 6.5 mg Inst  
Generic drug:  prasterone (dhea) 1 Dose Once a Day. letrozole 2.5 mg tablet Commonly known as:  FEMARA  
2.5 mg.  
  
 levothyroxine 88 mcg tablet Commonly known as:  SYNTHROID Take 88 mcg by mouth daily. losartan 100 mg tablet Commonly known as:  COZAAR Take 100 mg by mouth daily. Indications: takes in the evening  
  
 multivitamin tablet Commonly known as:  ONE A DAY Take 1 Tab by mouth daily. oxyCODONE-acetaminophen 5-325 mg per tablet Commonly known as:  PERCOCET Take 1 Tab by mouth every six (6) hours as needed for Pain. Max Daily Amount: 4 Tabs. Indications: Pain PROLIA 60 mg/mL injection Generic drug:  denosumab We Performed the Following CA 27.29 [36044 CPT(R)] COMPLETE CBC & AUTO DIFF WBC [99518 CPT(R)] METABOLIC PANEL, COMPREHENSIVE [44757 CPT(R)] Follow-up Instructions Return in about 4 months (around 2/9/2019). To-Do List   
 10/09/2018 Lab:  CBC WITH 3 PART DIFF Patient Instructions Complete Blood Count (CBC): About This Test 
What is it? A complete blood count (CBC) is a blood test that gives important information about your blood cells, especially red blood cells, white blood cells, and platelets. Why is this test done? A CBC may be done as part of a regular physical exam. There are many other reasons that a doctor may want this blood test, including to: · Find the cause of symptoms such as fatigue, weakness, fever, bruising, or weight loss. · Find anemia or an infection. · See how much blood has been lost if there is bleeding. · Diagnose diseases of the blood, such as leukemia or polycythemia.  
How can you prepare for the test? 
 You do not need to do anything before having this test. 
What happens during the test? 
The health professional taking a sample of your blood will: · Wrap an elastic band around your upper arm. This makes the veins below the band larger so it is easier to put a needle into the vein. · Clean the needle site with alcohol. · Put the needle into the vein. · Attach a tube to the needle to fill it with blood. · Remove the band from your arm when enough blood is collected. · Put a gauze pad or cotton ball over the needle site as the needle is removed. · Put pressure on the site and then put on a bandage. If this blood test is done on a baby, a heel stick may be done instead of a blood draw from a vein. What happens after the test? 
· You will probably be able to go home right away. · You can go back to your usual activities right away. Follow-up care is a key part of your treatment and safety. Be sure to make and go to all appointments, and call your doctor if you are having problems. It's also a good idea to keep a list of the medicines you take. Ask your doctor when you can expect to have your test results. Where can you learn more? Go to http://alexsandra-[email or web address].info/. Enter L672 in the search box to learn more about \"Complete Blood Count (CBC): About This Test.\" Current as of: June 26, 2018 Content Version: 11.8 © 3084-3569 Healthwise, Incorporated. Care instructions adapted under license by [employer] (which disclaims liability or warranty for this information). If you have questions about a medical condition or this instruction, always ask your healthcare professional. [place] [address] any warranty or liability for your use of this information. Introducing [address]! [facility] [employer] introduces [employer] patient portal. Now you can access parts of your medical record, email your doctor's office, and request medication refills online. 1. In your internet browser, go to [email or web address] 2. Click on the First Time User? Click Here link in the Sign In box. You will see the New Member Sign Up page. 3. Enter your Quandora Access Code exactly as it appears below. You will not need to use this code after youve completed the sign-up process. If you do not sign up before the expiration date, you must request a new code. · Quandora Access Code: [email or web address] Expires: 10/17/2018  5:23 AM 
 
4. Enter the last four digits of your Social Security Number (xxxx) and Date of Birth (mm/dd/yyyy) as indicated and click Submit. You will be taken to the next sign-up page. 5. Create a Immure Recordst ID. This will be your Quandora login ID and cannot be changed, so think of one that is secure and easy to remember. 6. Create a Quandora password. You can change your password at any time. 7. Enter your Password Reset Question and Answer. This can be used at a later time if you forget your password. 8. Enter your e-mail address. You will receive e-mail notification when new information is available in [address] 9. Click Sign Up. You can now view and download portions of your medical record. 10. Click the Download Summary menu link to download a portable copy of your medical information. If you have questions, please visit the Frequently Asked Questions section of the Quandora website. Remember, Quandora is NOT to be used for urgent needs. For medical emergencies, dial 911. Now available from your iPhone and Android! Please provide this summary of care documentation to your next provider. Your primary care clinician is listed as Barbara Zaidi. If you have any questions after today's visit, please call [phone].

## 2018-10-09 NOTE — PATIENT INSTRUCTIONS
Complete Blood Count (CBC): About This Test  What is it? A complete blood count (CBC) is a blood test that gives important information about your blood cells, especially red blood cells, white blood cells, and platelets. Why is this test done? A CBC may be done as part of a regular physical exam. There are many other reasons that a doctor may want this blood test, including to:  · Find the cause of symptoms such as fatigue, weakness, fever, bruising, or weight loss. · Find anemia or an infection. · See how much blood has been lost if there is bleeding. · Diagnose diseases of the blood, such as leukemia or polycythemia. How can you prepare for the test?  You do not need to do anything before having this test.  What happens during the test?  The health professional taking a sample of your blood will:  · Wrap an elastic band around your upper arm. This makes the veins below the band larger so it is easier to put a needle into the vein. · Clean the needle site with alcohol. · Put the needle into the vein. · Attach a tube to the needle to fill it with blood. · Remove the band from your arm when enough blood is collected. · Put a gauze pad or cotton ball over the needle site as the needle is removed. · Put pressure on the site and then put on a bandage. If this blood test is done on a baby, a heel stick may be done instead of a blood draw from a vein. What happens after the test?  · You will probably be able to go home right away. · You can go back to your usual activities right away. Follow-up care is a key part of your treatment and safety. Be sure to make and go to all appointments, and call your doctor if you are having problems. It's also a good idea to keep a list of the medicines you take. Ask your doctor when you can expect to have your test results. Where can you learn more? Go to http://alexsandra-rachel.info/.   Enter E816 in the search box to learn more about \"Complete Blood Count (CBC): About This Test.\"  Current as of: June 26, 2018  Content Version: 11.8  © 7820-2071 Healthwise, Incorporated. Care instructions adapted under license by [employer] (which disclaims liability or warranty for this information). If you have questions about a medical condition or this instruction, always ask your healthcare professional. [address] any warranty or liability for your use of this information.

## 2018-10-10 LAB
ALBUMIN SERPL-MCNC: 3.8 G/DL (ref 3.6–4.8)
ALBUMIN/GLOB SERPL: 1.2 {RATIO} (ref 1.2–2.2)
ALP SERPL-CCNC: 114 IU/L (ref 39–117)
ALT SERPL-CCNC: 10 IU/L (ref 0–32)
AST SERPL-CCNC: 10 IU/L (ref 0–40)
BILIRUB SERPL-MCNC: 0.2 MG/DL (ref 0–1.2)
BUN SERPL-MCNC: 18 MG/DL (ref 8–27)
BUN/CREAT SERPL: 15 (ref 12–28)
CALCIUM SERPL-MCNC: 8.4 MG/DL (ref 8.7–10.3)
CANCER AG27-29 SERPL-ACNC: 27.7 U/ML (ref 0–38.6)
CHLORIDE SERPL-SCNC: 108 MMOL/L (ref 96–106)
CO2 SERPL-SCNC: 18 MMOL/L (ref 20–29)
CREAT SERPL-MCNC: 1.17 MG/DL (ref 0.57–1)
GLOBULIN SER CALC-MCNC: 3.1 G/DL (ref 1.5–4.5)
GLUCOSE SERPL-MCNC: 137 MG/DL (ref 65–99)
POTASSIUM SERPL-SCNC: 5.1 MMOL/L (ref 3.5–5.2)
PROT SERPL-MCNC: 6.9 G/DL (ref 6–8.5)
SODIUM SERPL-SCNC: 140 MMOL/L (ref 134–144)

## 2019-01-25 PROBLEM — N20.0 STAGHORN KIDNEY STONES: Status: ACTIVE | Noted: 2019-01-25

## 2019-01-25 PROBLEM — N20.0 KIDNEY STONES: Status: ACTIVE | Noted: 2019-01-25

## 2019-02-05 ENCOUNTER — HOSPITAL ENCOUNTER (OUTPATIENT)
Dept: ONCOLOGY | Age: 64
Discharge: HOME | End: 2019-02-05

## 2019-02-05 ENCOUNTER — HOSPITAL ENCOUNTER (OUTPATIENT)
Dept: LAB | Age: 64
Discharge: HOME | End: 2019-02-05

## 2019-02-05 ENCOUNTER — OFFICE VISIT (OUTPATIENT)
Dept: ONCOLOGY | Age: 64
End: 2019-02-05

## 2019-02-05 ENCOUNTER — HOSPITAL ENCOUNTER (OUTPATIENT)
Dept: LAB | Age: 64
Discharge: HOME | End: 2019-02-05
Payer: COMMERCIAL

## 2019-02-05 VITALS
BODY MASS INDEX: 48.26 KG/M2 | HEIGHT: 59 IN | DIASTOLIC BLOOD PRESSURE: 72 MMHG | HEART RATE: 100 BPM | OXYGEN SATURATION: 97 % | WEIGHT: 239.4 LBS | TEMPERATURE: 98.3 F | SYSTOLIC BLOOD PRESSURE: 132 MMHG

## 2019-02-05 DIAGNOSIS — M19.90 ARTHRITIS: ICD-10-CM

## 2019-02-05 DIAGNOSIS — I89.0 LYMPHEDEMA OF UPPER EXTREMITY FOLLOWING LYMPHADENECTOMY: ICD-10-CM

## 2019-02-05 DIAGNOSIS — M62.838 MUSCLE SPASM: ICD-10-CM

## 2019-02-05 DIAGNOSIS — C50.411 MALIGNANT NEOPLASM OF UPPER-OUTER QUADRANT OF RIGHT FEMALE BREAST, UNSPECIFIED ESTROGEN RECEPTOR STATUS (HCC): ICD-10-CM

## 2019-02-05 DIAGNOSIS — C50.411 MALIGNANT NEOPLASM OF UPPER-OUTER QUADRANT OF RIGHT FEMALE BREAST, UNSPECIFIED ESTROGEN RECEPTOR STATUS (HCC): Primary | ICD-10-CM

## 2019-02-05 DIAGNOSIS — E89.89 LYMPHEDEMA OF UPPER EXTREMITY FOLLOWING LYMPHADENECTOMY: ICD-10-CM

## 2019-02-05 LAB
ALBUMIN SERPL-MCNC: 3.3 G/DL (ref 3.4–5)
ALBUMIN/GLOB SERPL: 0.8 {RATIO} (ref 0.8–1.7)
ALP SERPL-CCNC: 83 U/L (ref 45–117)
ALT SERPL-CCNC: 19 U/L (ref 13–56)
ANION GAP SERPL CALC-SCNC: 7 MMOL/L (ref 3–18)
AST SERPL-CCNC: 10 U/L (ref 15–37)
BASO+EOS+MONOS # BLD AUTO: 0.6 K/UL (ref 0–2.3)
BASO+EOS+MONOS NFR BLD AUTO: 7 % (ref 0.1–17)
BILIRUB SERPL-MCNC: 0.3 MG/DL (ref 0.2–1)
BUN SERPL-MCNC: 27 MG/DL (ref 7–18)
BUN/CREAT SERPL: 15 (ref 12–20)
CALCIUM SERPL-MCNC: 8.4 MG/DL (ref 8.5–10.1)
CHLORIDE SERPL-SCNC: 108 MMOL/L (ref 100–108)
CO2 SERPL-SCNC: 24 MMOL/L (ref 21–32)
CREAT SERPL-MCNC: 1.75 MG/DL (ref 0.6–1.3)
DIFFERENTIAL METHOD BLD: NORMAL
ERYTHROCYTE [DISTWIDTH] IN BLOOD BY AUTOMATED COUNT: 14.5 % (ref 11.5–14.5)
GLOBULIN SER CALC-MCNC: 4 G/DL (ref 2–4)
GLUCOSE SERPL-MCNC: 129 MG/DL (ref 74–99)
HCT VFR BLD AUTO: 37.5 % (ref 36–48)
HGB BLD-MCNC: 12.1 G/DL (ref 12–16)
LYMPHOCYTES # BLD: 2.2 K/UL (ref 1.1–5.9)
LYMPHOCYTES NFR BLD: 26 % (ref 14–44)
MCH RBC QN AUTO: 25.6 PG (ref 25–35)
MCHC RBC AUTO-ENTMCNC: 32.3 G/DL (ref 31–37)
MCV RBC AUTO: 79.3 FL (ref 78–102)
NEUTS SEG # BLD: 5.6 K/UL (ref 1.8–9.5)
NEUTS SEG NFR BLD: 66 % (ref 40–70)
PLATELET # BLD AUTO: 385 K/UL (ref 140–440)
POTASSIUM SERPL-SCNC: 4.4 MMOL/L (ref 3.5–5.5)
PROT SERPL-MCNC: 7.3 G/DL (ref 6.4–8.2)
RBC # BLD AUTO: 4.73 M/UL (ref 4.1–5.1)
SODIUM SERPL-SCNC: 139 MMOL/L (ref 136–145)
WBC # BLD AUTO: 8.4 K/UL (ref 4.5–13)

## 2019-02-05 PROCEDURE — 80053 COMPREHEN METABOLIC PANEL: CPT

## 2019-02-05 PROCEDURE — 86300 IMMUNOASSAY TUMOR CA 15-3: CPT

## 2019-02-05 PROCEDURE — 36415 COLL VENOUS BLD VENIPUNCTURE: CPT

## 2019-02-05 PROCEDURE — 99001 SPECIMEN HANDLING PT-LAB: CPT

## 2019-02-05 RX ORDER — IBUPROFEN 600 MG/1
600 TABLET ORAL
Qty: 120 TAB | Refills: 6 | Status: SHIPPED | OUTPATIENT
Start: 2019-02-05 | End: 2019-10-30 | Stop reason: SDUPTHER

## 2019-02-05 RX ORDER — LETROZOLE 2.5 MG/1
2.5 TABLET, FILM COATED ORAL EVERY EVENING
Qty: 90 TAB | Refills: 3 | Status: SHIPPED | OUTPATIENT
Start: 2019-02-05 | End: 2019-10-11 | Stop reason: SDUPTHER

## 2019-02-05 NOTE — PROGRESS NOTES
Hematology/Oncology  Progress Note    Name: Mick Cheema  Date: 2019  : 1955      Ms. Kelli Garcia is a 70year old female who was seen for management of her invasive ductal adenocarcinoma right and anemia. Current therapy: Femara 2.5 mg daily and oral iron supplementation daily. Subjective:     Ms. Kelli Garcia is a 68-year-old woman who is currently taking Femara 2.5 mg daily as long-term management for her invasive ductal carcinoma of the breast.  She continues to tolerate the medication well. The patient reports that she is doing her breast self-exam on a monthly basis. Additionally, she continues to take the iron supplement once daily. She reports she is currently undergoing therapy for lymphedema to the [facility] and has some improvement in pain and swelling. She is wearing her new sleeve and compression garment. She has no complaints of pain at this time. She denies CP or SOB. The patient reports her appetite is good. Her most recent mammogram was done in 2017 and there was no detectable abnormality. Past medical history, family history, and social history were reviewed and remain unchanged.   Past Medical History:   Diagnosis Date    Arthritis     Arthropathy, unspecified, site unspecified     Asthma 2018    Resolved, per patient    Breast CA ([facility] [address] 2014    Right Breast  19  resolved with surgery    Chemotherapy convalescence or palliative care     resolved    Diabetes mellitus     Family history of breast cancer     GERD (gastroesophageal reflux disease) 2018    Resolved, per patient    Hematuria     Hiatus hernia syndrome     resolved, per pt.  8-6-18    Hypertension     Insulin pump in place     Kidney stone     Lymphedema     right arm    Microhematuria     Morbid obesity ([address] 2018    BMI = 47.4    Nausea with vomiting     Postop N/V    Osteopenia     Radiation therapy complication     32 resolved, per patient    Recurrent UTI     Renal colic     S/P breast biopsy - right breast calcifications 10/18/10     Shortness of breath     With exertion. 8-6-18  pt denies     Staghorn calculus     Struvite kidney stones     Thyroid dysfunction     GOITER    Urethral diverticulum     Urine leukocytes     UTI (lower urinary tract infection)      Past Surgical History:   Procedure Laterality Date    HX BREAST BIOPSY  10/18/10    stereotactic - right breast calcifications    HX BREAST BIOPSY  2/14/2014    RIGHT BREAST BIOPSY performed by Juan Reyez MD at [address] HX CYST REMOVAL  1997    Bilateral breast.     HX GI      colonoscopy    HX GYN      urethra reconstruction x3    HX HYSTERECTOMY      HX MODIFIED RADICAL MASTECTOMY  3/21/2014    RIGHT MODIFIED RADICAL MASTECTOMY SENTINAL LYMPH NODE BIOPSY performed by Juan Reyez MD at [facility] MAIN OR    HX TUBAL LIGATION      HX UROLOGICAL      4 diverticuli repaired    HX UROLOGICAL  03/29/2011    Cysto, percutaneous nephrolithotomy, antegrade URS, antegrade pyelogram.  Dr. Jorge Bear, [facility].    HX UROLOGICAL  12/11/2009    Cysto, flexible URS, right RPG, lasertripsy, JJ stent placement. Dr. Jorge Bear, [facility] [place].    HX UROLOGICAL  11/20/2009    Second look right attempted percutaneous nephrolithotomy. Dr. Jorge Bear, [facility].    HX UROLOGICAL  10/23/2009    Cysto, right percutaneous nephrolithotomy. Dr. Jorge Bear, [facility].    HX UROLOGICAL  08/07/2018    Cysto, bilat RPG, right URS, HLL, right JJ stent placement, right nephrostomy tube removal, Dr. Jefry Latif, [address] [place] [address]    HX UROLOGICAL  09/04/2018    cysto, right JJ stent removal, bilat RPG. Dr Jefry Latif. [facility].   UROLOGICAL  11/20/2018    cysto, bilat RPG, left URS, laser lithotripsy, left JJ stent place. Dr. Jefry Latif. [facility].     HX VASCULAR ACCESS  2014    IR GUIDE DIL URET TRACT EXIST INCL NEW ACCESS W TUBE PLACMENT LT  1/25/2019     Social History     Socioeconomic History    Marital status: SINGLE     Spouse name: Not on file    Number of children: Not on file    Years of education: Not on file    Highest education level: Not on file   Social Needs    Financial resource strain: Not on file    Food insecurity - worry: Not on file    Food insecurity - inability: Not on file    Transportation needs - medical: Not on file   [employer] needs - non-medical: Not on file   Occupational History    Not on file   Tobacco Use    Smoking status: Never Smoker    Smokeless tobacco: Never Used   Substance and Sexual Activity    Alcohol use: No     Frequency: Never     Comment: occasionally    Drug use: No    Sexual activity: Not on file   Other Topics Concern    Not on file   Social History Narrative    Not on file     Family History   Problem Relation Age of Onset    Breast Cancer Sister 79    Diabetes Mother     Hypertension Mother     Breast Cancer Sister 39    Hypertension Other         Parent    Diabetes Other         parent    Stroke Other         parent    Hypertension Other         sibling    Diabetes Other         sibling    Osteoporosis Other         sibling    Breast Cancer Sister     Diabetes Sister     Diabetes Sister     Diabetes Sister      Current Outpatient Medications   Medication Sig Dispense Refill    letrozole (FEMARA) 2.5 mg tablet Take 1 Tab by mouth every evening. 90 Tab 3    ibuprofen (MOTRIN) 600 mg tablet Take 1 Tab by mouth every six (6) hours as needed for Pain. 120 Tab 6    oxyCODONE-acetaminophen (PERCOCET) 5-325 mg per tablet Take 1 Tab by mouth every four (4) hours as needed for Pain. Max Daily Amount: 6 Tabs. 20 Tab 0    docusate sodium (COLACE) 100 mg capsule Take 1 Cap by mouth two (2) times a day for 90 days. 40 Cap 0    trimethoprim-sulfamethoxazole (BACTRIM DS) 160-800 mg per tablet Take 1 Tab by mouth two (2) times a day. 10 Tab 0    oxyCODONE-acetaminophen (PERCOCET) 5-325 mg per tablet Take 1 Tab by mouth every four (4) hours as needed for Pain. Max Daily Amount: 6 Tabs.  20 Tab 0    cholecalciferol, vitamin D3, (VITAMIN D3) 2,000 unit tab Take 1 Tab by mouth daily.  PROLIA 60 mg/mL injection 60 mg every 6 months.  HUMALOG U-100 INSULIN 100 unit/mL injection 100 Units by SubCUTAneous route daily. Pt has insulin pump      losartan (COZAAR) 100 mg tablet Take 100 mg by mouth daily. Indications: takes in the evening      levothyroxine (SYNTHROID) 88 mcg tablet Take 88 mcg by mouth daily.  prasterone, dhea, (INTRAROSA) 6.5 mg inst 1 Dose Once a Day.  ibuprofen (MOTRIN) 600 mg tablet Take 1 Tab by mouth every six (6) hours as needed for Pain. 120 Tab 0    multivitamin (ONE A DAY) tablet Take 1 Tab by mouth daily. Review of Systems  Constitutional: The patient denies acute distress or discomfort  HEENT: The patient denies recent head trauma, eye pain, blurred vision,  hearing deficit, oropharyngeal mucosal pain or lesions, and the patient denies throat pain or discomfort. Lymphatics: The patient denies palpable peripheral lymphadenopathy. Hematologic: The patient denies having bruising, bleeding, or progressive fatigue. Respiratory: Patient denies having shortness of breath, cough, sputum production, fever, or dyspnea on exertion. Cardiovascular: The patient denies having leg pain, leg swelling, heart palpitations, chest permit, chest pain, or lightheadedness. The patient denies having dyspnea on exertion. Gastrointestinal: The patient denies having nausea, emesis, or diarrhea. The patient denies having any hematemesis or blood in the stool. Genitourinary: Patient denies having urinary urgency, frequency, or dysuria. The patient denies having blood in the urine. Psychological: The patient denies having symptoms of nervousness, anxiety, depression, or thoughts of harming himself some of this. Skin: Patient denies having skin rashes, skin, ulcerations, or unexplained itching or pruritus. Musculoskeletal: The patient has swelling in the right arm due to progressive lymphedema.  She is now wearing a lymphedema sleeve. Anterior chest wall and breasts: She denies muscle or joint aches or pains    Objective:     Visit Vitals  /72   Pulse 100   Temp 98.3 °F (36.8 °C)   Ht 4' 11\" (1.499 m)   Wt 108.6 kg (239 lb 6.4 oz)   SpO2 97%   BMI 48.35 kg/m²     ECOG PS=0 Pain score 0/10     Physical Exam:   Gen. Appearance: The patient is in no acute distress. Skin: There is no bruise or rash. HEENT: The exam is unremarkable. Neck: Supple without lymphadenopathy or thyromegaly. Lungs: Clear to auscultation and percussion; there are no wheezes or rhonchi. Heart: Regular rate and rhythm; there are no murmurs, gallops, or rubs. Anterior chest wall. Breasts: There is no evidence of local recurrence of disease. The axilla reveals no palpable axillary lymphadenopathy. Abdomen: Bowel sounds are present and normal.  There is no guarding, tenderness, or hepatosplenomegaly. Extremities: There is no clubbing, cyanosis. 2+ edema to RUE, patient is wearing a compression sleeve. Neurologic: There are no focal neurologic deficits. Lymphatics: There is no palpable peripheral lymphadenopathy. Musculoskeletal: The patient has full range of motion at all joints. There is no evidence of joint deformity or effusions. The patient has  right arm lymphedema extending from the fingers up to the shoulder area on the right. Psychological/psychiatric: There is no clinical evidence of anxiety, depression, or melancholy.     Lab data:      Results for orders placed or performed during the hospital encounter of 02/05/19   CBC WITH 3 PART DIFF     Status: None   Result Value Ref Range Status    WBC 8.4 4.5 - 13.0 K/uL Final    RBC 4.73 4.10 - 5.10 M/uL Final    HGB 12.1 12.0 - 16.0 g/dL Final    HCT 37.5 36 - 48 % Final    MCV 79.3 78 - 102 FL Final    MCH 25.6 25.0 - 35.0 PG Final    MCHC 32.3 31 - 37 g/dL Final    RDW 14.5 11.5 - 14.5 % Final    PLATELET 484 924 - 620 K/uL Final    NEUTROPHILS 66 40 - 70 % Final    MIXED CELLS 7 0.1 - 17 % Final    LYMPHOCYTES 26 14 - 44 % Final    ABS. NEUTROPHILS 5.6 1.8 - 9.5 K/UL Final    ABS. MIXED CELLS 0.6 0.0 - 2.3 K/uL Final    ABS. LYMPHOCYTES 2.2 1.1 - 5.9 K/UL Final     Comment: Test performed at [place] [address] Location. Results Reviewed by Medical Director. DF AUTOMATED   Final           Assessment:     1. Malignant neoplasm of upper-outer quadrant of right female breast, unspecified estrogen receptor status ([facility] [address]    2. Lymphedema of upper extremity following lymphadenectomy    3. Arthritis    4. Muscle spasm        Plan:   Right arm and hand lymphedema:She is currently undergoing lymphedema therapy and continues to wear her new sleeve, glove, and chest garment. I advised her to to avoid lifting  more than 10 pounds using the right arm and hand. She reports that the lymphedema has significantly improved since her last clinic visit. Breast cancer: I have encouraged patient to continue to her monthly breast self-examinations. At this time I will order a CA 27-29 level, and a comprehensive metabolic panel. The most recent CA 27-29 level was 27.7 units/mL from 10/10/2018. The most recent mammogram in April 2018 was negative for malignancy. The next mammogram is scheduled for April 2019    Arthritis: Motrin 600 mg every 6 hours will be ordered and she was instructed to use Percocet for severe pain. Muscle Spasms: Rx for Flexeril 5mg was ordered with instructions to take 1 TID as needed. I will have the patient return to the clinic again in 4 months.   Orders Placed This Encounter    COMPLETE CBC & AUTO DIFF WBC    InHouse CBC ([email or web address])     Standing Status:   Future     Number of Occurrences:   1     Standing Expiration Date:   6/80/9162    METABOLIC PANEL, COMPREHENSIVE     Standing Status:   Future     Standing Expiration Date:   2/6/2020    CA 27.29     Standing Status:   Future     Standing Expiration Date:   2/6/2020    letrozole [facility]) 2.5 mg tablet     Sig: Take 1 Tab by mouth every evening. Dispense:  90 Tab     Refill:  3    ibuprofen (MOTRIN) 600 mg tablet     Sig: Take 1 Tab by mouth every six (6) hours as needed for Pain.      Dispense:  120 Tab     Refill:  6       Rene Clark MD  2/5/2019

## 2019-02-06 LAB — CANCER AG27-29 SERPL-ACNC: 25.4 U/ML (ref 0–38.6)

## 2019-05-28 DIAGNOSIS — C50.411 MALIGNANT NEOPLASM OF UPPER-OUTER QUADRANT OF RIGHT FEMALE BREAST, UNSPECIFIED ESTROGEN RECEPTOR STATUS (HCC): ICD-10-CM

## 2019-05-28 RX ORDER — LETROZOLE 2.5 MG/1
TABLET, FILM COATED ORAL
Qty: 90 TAB | Refills: 2 | Status: SHIPPED | OUTPATIENT
Start: 2019-05-28 | End: 2019-10-30 | Stop reason: SDUPTHER

## 2019-06-07 ENCOUNTER — OFFICE VISIT (OUTPATIENT)
Dept: ONCOLOGY | Age: 64
End: 2019-06-07

## 2019-06-07 ENCOUNTER — HOSPITAL ENCOUNTER (OUTPATIENT)
Dept: ONCOLOGY | Age: 64
Discharge: HOME | End: 2019-06-07

## 2019-06-07 ENCOUNTER — HOSPITAL ENCOUNTER (OUTPATIENT)
Dept: LAB | Age: 64
Discharge: HOME | End: 2019-06-07
Payer: COMMERCIAL

## 2019-06-07 VITALS
BODY MASS INDEX: 48.87 KG/M2 | HEART RATE: 84 BPM | DIASTOLIC BLOOD PRESSURE: 72 MMHG | TEMPERATURE: 98.3 F | SYSTOLIC BLOOD PRESSURE: 136 MMHG | WEIGHT: 242.4 LBS | HEIGHT: 59 IN | OXYGEN SATURATION: 98 %

## 2019-06-07 DIAGNOSIS — C50.411 MALIGNANT NEOPLASM OF UPPER-OUTER QUADRANT OF RIGHT FEMALE BREAST, UNSPECIFIED ESTROGEN RECEPTOR STATUS (HCC): ICD-10-CM

## 2019-06-07 DIAGNOSIS — E89.89 LYMPHEDEMA OF UPPER EXTREMITY FOLLOWING LYMPHADENECTOMY: ICD-10-CM

## 2019-06-07 DIAGNOSIS — I89.0 LYMPHEDEMA OF UPPER EXTREMITY FOLLOWING LYMPHADENECTOMY: ICD-10-CM

## 2019-06-07 DIAGNOSIS — Z17.0 MALIGNANT NEOPLASM OF UPPER-OUTER QUADRANT OF RIGHT BREAST IN FEMALE, ESTROGEN RECEPTOR POSITIVE (HCC): Primary | ICD-10-CM

## 2019-06-07 DIAGNOSIS — M19.90 ARTHRITIS: ICD-10-CM

## 2019-06-07 DIAGNOSIS — C50.411 MALIGNANT NEOPLASM OF UPPER-OUTER QUADRANT OF RIGHT BREAST IN FEMALE, ESTROGEN RECEPTOR POSITIVE (HCC): Primary | ICD-10-CM

## 2019-06-07 DIAGNOSIS — C50.411 MALIGNANT NEOPLASM OF UPPER-OUTER QUADRANT OF RIGHT BREAST IN FEMALE, ESTROGEN RECEPTOR POSITIVE (HCC): ICD-10-CM

## 2019-06-07 DIAGNOSIS — Z17.0 MALIGNANT NEOPLASM OF UPPER-OUTER QUADRANT OF RIGHT BREAST IN FEMALE, ESTROGEN RECEPTOR POSITIVE (HCC): ICD-10-CM

## 2019-06-07 DIAGNOSIS — M62.838 MUSCLE SPASM: ICD-10-CM

## 2019-06-07 LAB
ALBUMIN SERPL-MCNC: 3.7 G/DL (ref 3.4–5)
ALBUMIN/GLOB SERPL: 0.9 {RATIO} (ref 0.8–1.7)
ALP SERPL-CCNC: 108 U/L (ref 45–117)
ALT SERPL-CCNC: 18 U/L (ref 13–56)
ANION GAP SERPL CALC-SCNC: 9 MMOL/L (ref 3–18)
AST SERPL-CCNC: 12 U/L (ref 15–37)
BASO+EOS+MONOS # BLD AUTO: 0.7 K/UL (ref 0–2.3)
BASO+EOS+MONOS NFR BLD AUTO: 6 % (ref 0.1–17)
BILIRUB SERPL-MCNC: 0.2 MG/DL (ref 0.2–1)
BUN SERPL-MCNC: 33 MG/DL (ref 7–18)
BUN/CREAT SERPL: 19 (ref 12–20)
CALCIUM SERPL-MCNC: 9.2 MG/DL (ref 8.5–10.1)
CHLORIDE SERPL-SCNC: 108 MMOL/L (ref 100–108)
CO2 SERPL-SCNC: 24 MMOL/L (ref 21–32)
CREAT SERPL-MCNC: 1.74 MG/DL (ref 0.6–1.3)
DIFFERENTIAL METHOD BLD: NORMAL
ERYTHROCYTE [DISTWIDTH] IN BLOOD BY AUTOMATED COUNT: 13 % (ref 11.5–14.5)
GLOBULIN SER CALC-MCNC: 4 G/DL (ref 2–4)
GLUCOSE SERPL-MCNC: 90 MG/DL (ref 74–99)
HCT VFR BLD AUTO: 39.6 % (ref 36–48)
HGB BLD-MCNC: 12.6 G/DL (ref 12–16)
LYMPHOCYTES # BLD: 3 K/UL (ref 1.1–5.9)
LYMPHOCYTES NFR BLD: 28 % (ref 14–44)
MCH RBC QN AUTO: 25.8 PG (ref 25–35)
MCHC RBC AUTO-ENTMCNC: 31.8 G/DL (ref 31–37)
MCV RBC AUTO: 81.1 FL (ref 78–102)
NEUTS SEG # BLD: 6.9 K/UL (ref 1.8–9.5)
NEUTS SEG NFR BLD: 66 % (ref 40–70)
PLATELET # BLD AUTO: 312 K/UL (ref 140–440)
POTASSIUM SERPL-SCNC: 4.3 MMOL/L (ref 3.5–5.5)
PROT SERPL-MCNC: 7.7 G/DL (ref 6.4–8.2)
RBC # BLD AUTO: 4.88 M/UL (ref 4.1–5.1)
SODIUM SERPL-SCNC: 141 MMOL/L (ref 136–145)
WBC # BLD AUTO: 10.6 K/UL (ref 4.5–13)

## 2019-06-07 PROCEDURE — 80053 COMPREHEN METABOLIC PANEL: CPT

## 2019-06-07 PROCEDURE — 86300 IMMUNOASSAY TUMOR CA 15-3: CPT

## 2019-06-07 RX ORDER — OXYCODONE AND ACETAMINOPHEN 5; 325 MG/1; MG/1
1 TABLET ORAL
Qty: 90 TAB | Refills: 0 | Status: SHIPPED | OUTPATIENT
Start: 2019-06-07 | End: 2019-07-07

## 2019-06-07 NOTE — PROGRESS NOTES
Hematology/Oncology  Progress Note    Name: Lilliana Navarrete  Date: 2019  : 1955      Ms. Vonnie Suazo is a 70year old female who was seen for management of her invasive ductal adenocarcinoma right and anemia. Current therapy: Femara 2.5mg PO daily and oral iron supplementation daily. Subjective:     Ms. Vonnie Suazo is a 68-year-old woman who is currently taking Femara 2.5mg PO daily as long-term management for her invasive ductal carcinoma of the breast.  She states she continues to tolerate the medication well. The patient reports that she is doing her breast self-exam on a monthly basis. Additionally, she continues to take the iron supplement once daily. She reports she is currently undergoing therapy for lymphedema to the [facility] and has some improvement in pain and swelling. She is wearing her new sleeve and compression garment. She has no complaints of pain at this time. She denies chest pain or shortness of breath. The patient reports her appetite is good. She is requesting for a new Rx for her Percocet. She does not have any concerns or complaints to report at this time. Past medical history, family history, and social history were reviewed and remain unchanged.   Past Medical History:   Diagnosis Date    Arthritis     Arthropathy, unspecified, site unspecified     Asthma 2018    Resolved, per patient    Breast CA ([facility] [address] 2014    Right Breast  19  resolved with surgery    Chemotherapy convalescence or palliative care     resolved    Diabetes mellitus     Family history of breast cancer     GERD (gastroesophageal reflux disease) 2018    Resolved, per patient    Hematuria     Hiatus hernia syndrome     resolved, per pt.  8-6-18    Hypertension     Insulin pump in place     Kidney stone     Lymphedema     right arm    Microhematuria     Morbid obesity ([facility] [address] 2018    BMI = 47.4    Nausea with vomiting     Postop N/V    Osteopenia     Radiation therapy complication     9-96-51 resolved, per patient    Recurrent UTI     Renal colic     S/P breast biopsy - right breast calcifications 10/18/10     Shortness of breath     With exertion. 8-6-18  pt denies     Staghorn calculus     Struvite kidney stones     Thyroid dysfunction     GOITER    Urethral diverticulum     Urine leukocytes     UTI (lower urinary tract infection)      Past Surgical History:   Procedure Laterality Date    HX BREAST BIOPSY  10/18/10    stereotactic - right breast calcifications    HX BREAST BIOPSY  2/14/2014    RIGHT BREAST BIOPSY performed by Fallon Rg MD at [address] [place] [address] [facility] HX CYST REMOVAL  1997    Bilateral breast.     HX GI      colonoscopy    HX GYN      urethra reconstruction x3    HX HYSTERECTOMY      HX MODIFIED RADICAL MASTECTOMY  3/21/2014    RIGHT MODIFIED RADICAL MASTECTOMY SENTINAL LYMPH NODE BIOPSY performed by Fallon gR MD at [facility] MAIN OR    HX TUBAL LIGATION      HX UROLOGICAL      4 diverticuli repaired    HX UROLOGICAL  03/29/2011    Cysto, percutaneous nephrolithotomy, antegrade URS, antegrade pyelogram.  Dr. Elizabeth Franz, [facility].    HX UROLOGICAL  12/11/2009    Cysto, flexible URS, right RPG, lasertripsy, JJ stent placement. Dr. Elizabeth Franz, [facility].    HX UROLOGICAL  11/20/2009    Second look right attempted percutaneous nephrolithotomy. Dr. Elizabeth Franz, [facility].    HX UROLOGICAL  10/23/2009    Cysto, right percutaneous nephrolithotomy. Dr. Elizabeth Franz, [facility].    HX UROLOGICAL  08/07/2018    Cysto, bilat RPG, right URS, HLL, right JJ stent placement, right nephrostomy tube removal, Dr. Adriane Hdez, [facility]    HX UROLOGICAL  09/04/2018    cysto, right JJ stent removal, bilat RPG. Dr Forrest Stager. [address]  HX UROLOGICAL  11/20/2018    cysto, bilat RPG, left URS, laser lithotripsy, left JJ stent place. Dr. Forrest Stager. [id number]  HX UROLOGICAL  01/25/2019    Left PCNL and removal of left JJ stent. Dr. Forrest Stageayo at [address]     HX VASCULAR ACCESS  2014    IR GUIDE DIL URET TRACT EXIST INCL NEW ACCESS W TUBE PLACMENT LT  1/25/2019     Social History     Socioeconomic History    Marital status: SINGLE     Spouse name: Not on file    Number of children: Not on file    Years of education: Not on file    Highest education level: Not on file   Occupational History    Not on file   Social Needs    Financial resource strain: Not on file    Food insecurity:     Worry: Not on file     Inability: Not on file    Transportation needs:     Medical: Not on file     Non-medical: Not on file   Tobacco Use    Smoking status: Never Smoker    Smokeless tobacco: Never Used   Substance and Sexual Activity    Alcohol use: Yes     Frequency: Never     Comment: occasionally    Drug use: No    Sexual activity: Not on file   Lifestyle    Physical activity:     Days per week: Not on file     Minutes per session: Not on file    Stress: Not on file   Relationships    Social connections:     Talks on phone: Not on file     Gets together: Not on file     Attends [employer] service: Not on file     Active member of club or organization: Not on file     Attends meetings of clubs or organizations: Not on file     Relationship status: Not on file    Intimate partner violence:     Fear of current or ex partner: Not on file     Emotionally abused: Not on file     Physically abused: Not on file     Forced sexual activity: Not on file   Other Topics Concern    Not on file   Social History Narrative    Not on file     Family History   Problem Relation Age of Onset    Breast Cancer Sister 79    Diabetes Mother     Hypertension Mother     Breast Cancer Sister 39    Hypertension Other         Parent    Diabetes Other         parent    Stroke Other         parent    Hypertension Other         sibling    Diabetes Other         sibling    Osteoporosis Other         sibling    Breast Cancer Sister     Diabetes Sister     Diabetes Sister     Diabetes Sister      Current Outpatient Medications   Medication Sig Dispense Refill    oxyCODONE-acetaminophen (PERCOCET) 5-325 mg per tablet Take 1 Tab by mouth every eight (8) hours as needed for Pain for up to 30 days. Max Daily Amount: 3 Tabs. 90 Tab 0    letrozole (FEMARA) 2.5 mg tablet TAKE 1 TABLET BY MOUTH ONCE DAILY 90 Tab 2    potassium citrate (UROCIT-K) 15 mEq TbER tablet Take 1 Tab by mouth three (3) times daily (with meals). 180 Tab 6    letrozole (FEMARA) 2.5 mg tablet Take 1 Tab by mouth every evening. 90 Tab 3    ibuprofen (MOTRIN) 600 mg tablet Take 1 Tab by mouth every six (6) hours as needed for Pain. 120 Tab 6    oxyCODONE-acetaminophen (PERCOCET) 5-325 mg per tablet Take 1 Tab by mouth every four (4) hours as needed for Pain. Max Daily Amount: 6 Tabs. 20 Tab 0    PROLIA 60 mg/mL injection 60 mg every 6 months.  HUMALOG U-100 INSULIN 100 unit/mL injection 100 Units by SubCUTAneous route daily. Pt has insulin pump      losartan (COZAAR) 100 mg tablet Take 100 mg by mouth daily. Indications: takes in the evening      levothyroxine (SYNTHROID) 88 mcg tablet Take 88 mcg by mouth daily.  ibuprofen (MOTRIN) 600 mg tablet Take 1 Tab by mouth every six (6) hours as needed for Pain. 120 Tab 0    multivitamin (ONE A DAY) tablet Take 1 Tab by mouth daily. Review of Systems  Constitutional: The patient denies acute distress or discomfort  HEENT: The patient denies recent head trauma, eye pain, blurred vision,  hearing deficit, oropharyngeal mucosal pain or lesions, and the patient denies throat pain or discomfort. Lymphatics: The patient denies palpable peripheral lymphadenopathy. Hematologic: The patient denies having bruising, bleeding, or progressive fatigue. Respiratory: Patient denies having shortness of breath, cough, sputum production, fever, or dyspnea on exertion. Cardiovascular: The patient denies having leg pain, leg swelling, heart palpitations, chest permit, chest pain, or lightheadedness.   The patient denies having dyspnea on exertion. Gastrointestinal: The patient denies having nausea, emesis, or diarrhea. The patient denies having any hematemesis or blood in the stool. Genitourinary: Patient denies having urinary urgency, frequency, or dysuria. The patient denies having blood in the urine. Psychological: The patient denies having symptoms of nervousness, anxiety, depression, or thoughts of harming himself some of this. Skin: Patient denies having skin rashes, skin, ulcerations, or unexplained itching or pruritus. Musculoskeletal: The patient has swelling in the right arm due to progressive lymphedema. She is now wearing a lymphedema sleeve. Anterior chest wall and breasts: She denies muscle or joint aches or pains    Objective:     Visit Vitals  /72 (BP 1 Location: Left arm, BP Patient Position: Sitting)   Pulse 84   Temp 98.3 °F (36.8 °C) (Oral)   Ht 4' 11\" (1.499 m)   Wt 110 kg (242 lb 6.4 oz)   SpO2 98%   BMI 48.96 kg/m²     ECOG PS=0 Pain score 0/10     Physical Exam:   Gen. Appearance: The patient is in no acute distress. Skin: There is no bruise or rash. HEENT: The exam is unremarkable. Neck: Supple without lymphadenopathy or thyromegaly. Lungs: Clear to auscultation and percussion; there are no wheezes or rhonchi. Heart: Regular rate and rhythm; there are no murmurs, gallops, or rubs. Anterior chest wall. Breasts: There is no evidence of local recurrence of disease. The axilla reveals no palpable axillary lymphadenopathy. Abdomen: Bowel sounds are present and normal.  There is no guarding, tenderness, or hepatosplenomegaly. Extremities: There is no clubbing, cyanosis. 2+ edema to RUE, patient is wearing a compression sleeve. Neurologic: There are no focal neurologic deficits. Lymphatics: There is no palpable peripheral lymphadenopathy. Musculoskeletal: The patient has full range of motion at all joints. There is no evidence of joint deformity or effusions.   The patient has  right arm lymphedema extending from the fingers up to the shoulder area on the right. Psychological/psychiatric: There is no clinical evidence of anxiety, depression, or melancholy. Lab data:      Results for orders placed or performed during the hospital encounter of 06/07/19   CBC WITH 3 PART DIFF     Status: None   Result Value Ref Range Status    WBC 10.6 4.5 - 13.0 K/uL Final    RBC 4.88 4.10 - 5.10 M/uL Final    HGB 12.6 12.0 - 16.0 g/dL Final    HCT 39.6 36 - 48 % Final    MCV 81.1 78 - 102 FL Final    MCH 25.8 25.0 - 35.0 PG Final    MCHC 31.8 31 - 37 g/dL Final    RDW 13.0 11.5 - 14.5 % Final    PLATELET 979 382 - 194 K/uL Final    NEUTROPHILS 66 40 - 70 % Final    MIXED CELLS 6 0.1 - 17 % Final    LYMPHOCYTES 28 14 - 44 % Final    ABS. NEUTROPHILS 6.9 1.8 - 9.5 K/UL Final    ABS. MIXED CELLS 0.7 0.0 - 2.3 K/uL Final    ABS. LYMPHOCYTES 3.0 1.1 - 5.9 K/UL Final     Comment: Test performed at [address] [place] [address] or Outpatient Infusion Center Location. Reviewed by Medical Director. DF AUTOMATED   Final         Assessment:     1. Malignant neoplasm of upper-outer quadrant of right breast in female, estrogen receptor positive ([facility] [address]    2. Arthritis    3. Lymphedema of upper extremity following lymphadenectomy    4. Muscle spasm        Plan:   Breast cancer: I have encouraged the patient to continue with her monthly breast self-examinations. At this time I will order a CA 27-29 level, and a comprehensive metabolic panel. The most recent CA 27-29 level was 25.4  units/mL from 02/06/2019. The most recent mammogram in April 2018 was negative for malignancy. She was reminded today for her mammogram follow up. Arthritis: She is currently using Motrin 600mg PO every 6 hours for moderate pain and she was instructed to use Percocet for severe pain. Right arm and hand lymphedema:She is currently undergoing lymphedema therapy and continues to wear her new sleeve, glove, and chest garment.  I advised her to to avoid lifting more than 10 pounds using the right arm and hand. She reports that the lymphedema has significantly improved since her last clinic visit. Muscle Spasms: She is currently using Flexeril 5mg with instructions to take 1 tab TID as needed. She did not request for a new Rx today. I will have the patient return to the clinic again in 4 months or sooner if indicated. Orders Placed This Encounter    COMPLETE CBC & AUTO DIFF WBC    InHouse CBC ([employer])     Standing Status:   Future     Number of Occurrences:   1     Standing Expiration Date:   6/57/6691    METABOLIC PANEL, COMPREHENSIVE     Standing Status:   Future     Standing Expiration Date:   6/7/2020    CA 27.29     Standing Status:   Future     Standing Expiration Date:   6/7/2020       Carlos A Martínez NP  6/7/2019       I have assessed the patient independently and  agree with the full assessment as outlined.   Mere Boss MD, [facility]

## 2019-06-07 NOTE — PATIENT INSTRUCTIONS
Arthritis: Care Instructions  Your Care Instructions  Arthritis, also called osteoarthritis, is a breakdown of the cartilage that cushions your joints. When the cartilage wears down, your bones rub against each other. This causes pain and stiffness. Many people have some arthritis as they age. Arthritis most often affects the joints of the spine, hands, hips, knees, or feet. You can take simple measures to protect your joints, ease your pain, and help you stay active. Follow-up care is a key part of your treatment and safety. Be sure to make and go to all appointments, and call your doctor if you are having problems. It's also a good idea to know your test results and keep a list of the medicines you take. How can you care for yourself at home? · Stay at a healthy weight. Being overweight puts extra strain on your joints. · Talk to your doctor or physical therapist about exercises that will help ease joint pain. ? Stretch. You may enjoy gentle forms of yoga to help keep your joints and muscles flexible. ? Walk instead of jog. Other types of exercise that are less stressful on the joints include riding a bicycle, swimming, diann chi, or water exercise. ? Lift weights. Strong muscles help reduce stress on your joints. Stronger thigh muscles, for example, take some of the stress off of the knees and hips. Learn the right way to lift weights so you do not make joint pain worse. · Take your medicines exactly as prescribed. Call your doctor if you think you are having a problem with your medicine. · Take pain medicines exactly as directed. ? If the doctor gave you a prescription medicine for pain, take it as prescribed. ? If you are not taking a prescription pain medicine, ask your doctor if you can take an over-the-counter medicine. · Use a cane, crutch, walker, or another device if you need help to get around. These can help rest your joints.  You also can use other things to make life easier, such as a higher toilet seat and padded handles on kitchen utensils. · Do not sit in low chairs, which can make it hard to get up. · Put heat or cold on your sore joints as needed. Use whichever helps you most. You also can take turns with hot and cold packs. ? Apply heat 2 or 3 times a day for 20 to 30 minutes--using a heating pad, hot shower, or hot pack--to relieve pain and stiffness. ? Put ice or a cold pack on your sore joint for 10 to 20 minutes at a time. Put a thin cloth between the ice and your skin. When should you call for help? Call your doctor now or seek immediate medical care if:    · You have sudden swelling, warmth, or pain in any joint.     · You have joint pain and a fever or rash.     · You have such bad pain that you cannot use a joint.    Watch closely for changes in your health, and be sure to contact your doctor if:    · You have mild joint symptoms that continue even with more than 6 weeks of care at home.     · You have stomach pain or other problems with your medicine. Where can you learn more? Go to http://alexsandra-rachel.info/. Enter G364 in the search box to learn more about \"Arthritis: Care Instructions. \"  Current as of: Aline 10, 2018  Content Version: 11.9  © 9066-7445 [employer]. Care instructions adapted under license by [employer] (which disclaims liability or warranty for this information). If you have questions about a medical condition or this instruction, always ask your healthcare professional. Kaela Yadav any warranty or liability for your use of this information.

## 2019-06-09 LAB — CANCER AG27-29 SERPL-ACNC: 34.6 U/ML (ref 0–38.6)

## 2019-10-11 ENCOUNTER — OFFICE VISIT (OUTPATIENT)
Dept: ONCOLOGY | Age: 64
End: 2019-10-11

## 2019-10-11 VITALS
SYSTOLIC BLOOD PRESSURE: 141 MMHG | OXYGEN SATURATION: 99 % | HEIGHT: 59 IN | WEIGHT: 241 LBS | DIASTOLIC BLOOD PRESSURE: 78 MMHG | RESPIRATION RATE: 16 BRPM | TEMPERATURE: 98.7 F | BODY MASS INDEX: 48.59 KG/M2 | HEART RATE: 99 BPM

## 2019-10-11 DIAGNOSIS — N20.0 STAGHORN CALCULUS: ICD-10-CM

## 2019-10-11 DIAGNOSIS — Z17.0 MALIGNANT NEOPLASM OF UPPER-OUTER QUADRANT OF RIGHT BREAST IN FEMALE, ESTROGEN RECEPTOR POSITIVE (HCC): Primary | ICD-10-CM

## 2019-10-11 DIAGNOSIS — M19.90 ARTHRITIS: ICD-10-CM

## 2019-10-11 DIAGNOSIS — I89.0 LYMPHEDEMA OF UPPER EXTREMITY FOLLOWING LYMPHADENECTOMY: ICD-10-CM

## 2019-10-11 DIAGNOSIS — E89.89 LYMPHEDEMA OF UPPER EXTREMITY FOLLOWING LYMPHADENECTOMY: ICD-10-CM

## 2019-10-11 DIAGNOSIS — M62.838 MUSCLE SPASM: ICD-10-CM

## 2019-10-11 DIAGNOSIS — C50.411 MALIGNANT NEOPLASM OF UPPER-OUTER QUADRANT OF RIGHT BREAST IN FEMALE, ESTROGEN RECEPTOR POSITIVE (HCC): Primary | ICD-10-CM

## 2019-10-11 RX ORDER — OXYCODONE AND ACETAMINOPHEN 5; 325 MG/1; MG/1
1 TABLET ORAL
Qty: 30 TAB | Refills: 0 | Status: SHIPPED | OUTPATIENT
Start: 2019-10-11 | End: 2020-05-29 | Stop reason: SDUPTHER

## 2019-10-11 RX ORDER — IBUPROFEN 600 MG/1
600 TABLET ORAL
Qty: 120 TAB | Refills: 0 | Status: SHIPPED | OUTPATIENT
Start: 2019-10-11 | End: 2020-01-19

## 2019-10-11 RX ORDER — LETROZOLE 2.5 MG/1
2.5 TABLET, FILM COATED ORAL EVERY EVENING
Qty: 90 TAB | Refills: 3 | Status: SHIPPED | OUTPATIENT
Start: 2019-10-11 | End: 2020-05-29 | Stop reason: SDUPTHER

## 2019-10-11 NOTE — PROGRESS NOTES
Hematology/Oncology  Progress Note    Name: Zurdo Martinez  Date: 10/11/2019  : 1955    Ms. Jacob Maldonado is a 75year old female who was seen for management of her invasive ductal adenocarcinoma right and anemia. Current therapy: Femara 2.5mg PO daily and oral iron supplementation daily. Subjective:     Ms. Jacob Maldonado is a 52-year-old woman who is currently taking Femara 2.5mg PO daily as long-term management for her invasive ductal carcinoma of the breast.  She states she continues to tolerate the medication well. The patient reports that she is doing her breast self-exam on a monthly basis. Additionally, she continues to take the iron supplement once daily. She reports she is currently undergoing therapy for lymphedema to the [facility] and has some improvement in pain and swelling. She is wearing her new sleeve and compression garment. She has no complaints of pain at this time. She denies chest pain or shortness of breath. The patient reports her appetite is good. She is requesting for a new Rx for her Percocet, Ibuprofen, and Femara. She does not have any concerns or complaints to report at this time. Past medical history, family history, and social history were reviewed and remain unchanged.   Past Medical History:   Diagnosis Date    Arthritis     Arthropathy, unspecified, site unspecified     Asthma 2018    Resolved, per patient    Breast CA ([facility] [address] 2014    Right Breast  19  resolved with surgery    Chemotherapy convalescence or palliative care     resolved    Diabetes mellitus     Family history of breast cancer     GERD (gastroesophageal reflux disease) 2018    Resolved, per patient    Hematuria     Hiatus hernia syndrome     resolved, per pt.  8-6-18    Hypertension     Insulin pump in place     Kidney stone     Lymphedema     right arm    Microhematuria     Morbid obesity ([facility] [address] 2018    BMI = 47.4    Nausea with vomiting     Postop N/V    Osteopenia     Radiation therapy complication     0-34-65 resolved, per patient    Recurrent UTI     Renal colic     S/P breast biopsy - right breast calcifications 10/18/10     Shortness of breath     With exertion. 8-6-18  pt denies     Staghorn calculus     Struvite kidney stones     Thyroid dysfunction     GOITER    Urethral diverticulum     Urine leukocytes     UTI (lower urinary tract infection)      Past Surgical History:   Procedure Laterality Date    HX BREAST BIOPSY  10/18/10    stereotactic - right breast calcifications    HX BREAST BIOPSY  2/14/2014    RIGHT BREAST BIOPSY performed by Ulises Green MD at [address] [place] [address] HX CYST REMOVAL  1997    Bilateral breast.     HX GI      colonoscopy    HX GYN      urethra reconstruction x3    HX HYSTERECTOMY      HX MODIFIED RADICAL MASTECTOMY  3/21/2014    RIGHT MODIFIED RADICAL MASTECTOMY SENTINAL LYMPH NODE BIOPSY performed by Ulises Green MD at [facility] MAIN OR    HX TUBAL LIGATION      HX UROLOGICAL      4 diverticuli repaired    HX UROLOGICAL  03/29/2011    Cysto, percutaneous nephrolithotomy, antegrade URS, antegrade pyelogram.  Dr. Padmini Puente, [facility].    HX UROLOGICAL  12/11/2009    Cysto, flexible URS, right RPG, lasertripsy, JJ stent placement. Dr. Padmini Puente, [address] [place] [address] [place].     UROLOGICAL  11/20/2009    Second look right attempted percutaneous nephrolithotomy. Dr. Padmini Puente, [facility].     UROLOGICAL  10/23/2009    Cysto, right percutaneous nephrolithotomy. Dr. Padmini Puente, [facility].    HX UROLOGICAL  08/07/2018    Cysto, bilat RPG, right URS, HLL, right JJ stent placement, right nephrostomy tube removal, Dr. Barbara Rogers, [facility]    HX UROLOGICAL  09/04/2018    cysto, right JJ stent removal, bilat RPG. Dr Barbara Rogers. [address]  HX UROLOGICAL  11/20/2018    cysto, bilat RPG, left URS, laser lithotripsy, left JJ stent place. Dr. Barbara Rogers. [address]  HX UROLOGICAL  01/25/2019    Left PCNL and removal of left JJ stent. Dr. Barbara Rogers at [address]     HX VASCULAR ACCESS  2014    IR GUIDE DIL URET TRACT EXIST INCL NEW ACCESS W TUBE PLACMENT LT  1/25/2019     Social History     Socioeconomic History    Marital status: SINGLE     Spouse name: Not on file    Number of children: Not on file    Years of education: Not on file    Highest education level: Not on file   Occupational History    Not on file   Social Needs    Financial resource strain: Not on file    Food insecurity:     Worry: Not on file     Inability: Not on file    Transportation needs:     Medical: Not on file     Non-medical: Not on file   Tobacco Use    Smoking status: Never Smoker    Smokeless tobacco: Never Used   Substance and Sexual Activity    Alcohol use: Yes     Frequency: Never     Comment: occasionally    Drug use: No    Sexual activity: Not on file   Lifestyle    Physical activity:     Days per week: Not on file     Minutes per session: Not on file    Stress: Not on file   Relationships    Social connections:     Talks on phone: Not on file     Gets together: Not on file     Attends Yazdanism service: Not on file     Active member of club or organization: Not on file     Attends meetings of clubs or organizations: Not on file     Relationship status: Not on file    Intimate partner violence:     Fear of current or ex partner: Not on file     Emotionally abused: Not on file     Physically abused: Not on file     Forced sexual activity: Not on file   Other Topics Concern    Not on file   Social History Narrative    Not on file     Family History   Problem Relation Age of Onset    Breast Cancer Sister 79    Diabetes Mother     Hypertension Mother     Breast Cancer Sister 39    Hypertension Other         Parent    Diabetes Other         parent    Stroke Other         parent    Hypertension Other         sibling    Diabetes Other         sibling    Osteoporosis Other         sibling    Breast Cancer Sister     Diabetes Sister     Diabetes Sister     Diabetes Sister      Current Outpatient Medications   Medication Sig Dispense Refill    ibuprofen (MOTRIN) 600 mg tablet Take 1 Tab by mouth every six (6) hours as needed for Pain. 120 Tab 0    letrozole (FEMARA) 2.5 mg tablet Take 1 Tab by mouth every evening. 90 Tab 3    oxyCODONE-acetaminophen (PERCOCET) 5-325 mg per tablet Take 1 Tab by mouth every four (4) hours as needed for Pain for up to 5 days. Max Daily Amount: 6 Tabs. Indications: pain 30 Tab 0    letrozole (FEMARA) 2.5 mg tablet TAKE 1 TABLET BY MOUTH ONCE DAILY 90 Tab 2    potassium citrate (UROCIT-K) 15 mEq TbER tablet Take 1 Tab by mouth three (3) times daily (with meals). 180 Tab 6    ibuprofen (MOTRIN) 600 mg tablet Take 1 Tab by mouth every six (6) hours as needed for Pain. 120 Tab 6    PROLIA 60 mg/mL injection 60 mg every 6 months.  HUMALOG U-100 INSULIN 100 unit/mL injection 100 Units by SubCUTAneous route daily. Pt has insulin pump      losartan (COZAAR) 100 mg tablet Take 100 mg by mouth daily. Indications: takes in the evening      levothyroxine (SYNTHROID) 88 mcg tablet Take 88 mcg by mouth daily.  multivitamin (ONE A DAY) tablet Take 1 Tab by mouth daily. Review of Systems  Constitutional: The patient denies acute distress or discomfort  HEENT: The patient denies recent head trauma, eye pain, blurred vision,  hearing deficit, oropharyngeal mucosal pain or lesions, and the patient denies throat pain or discomfort. Lymphatics: The patient denies palpable peripheral lymphadenopathy. Hematologic: The patient denies having bruising, bleeding, or progressive fatigue. Respiratory: Patient denies having shortness of breath, cough, sputum production, fever, or dyspnea on exertion. Cardiovascular: The patient denies having leg pain, leg swelling, heart palpitations, chest permit, chest pain, or lightheadedness. The patient denies having dyspnea on exertion. Gastrointestinal: The patient denies having nausea, emesis, or diarrhea.  The patient denies having any hematemesis or blood in the stool.  Genitourinary: Patient denies having urinary urgency, frequency, or dysuria. The patient denies having blood in the urine. Psychological: The patient denies having symptoms of nervousness, anxiety, depression, or thoughts of harming himself some of this. Skin: Patient denies having skin rashes, skin, ulcerations, or unexplained itching or pruritus. Musculoskeletal: The patient has swelling in the right arm due to progressive lymphedema. She is now wearing a lymphedema sleeve. Anterior chest wall and breasts: She denies muscle or joint aches or pains    Objective:     Visit Vitals  /78   Pulse 99   Temp 98.7 °F (37.1 °C) (Oral)   Resp 16   Ht 4' 11\" (1.499 m)   Wt 109.3 kg (241 lb)   SpO2 99%   BMI 48.68 kg/m²     ECOG PS=0 Pain score 0/10     Physical Exam:   Gen. Appearance: The patient is in no acute distress. Skin: There is no bruise or rash. HEENT: The exam is unremarkable. Neck: Supple without lymphadenopathy or thyromegaly. Lungs: Clear to auscultation and percussion; there are no wheezes or rhonchi. Heart: Regular rate and rhythm; there are no murmurs, gallops, or rubs. Anterior chest wall. Breasts: There is no evidence of local recurrence of disease. The axilla reveals no palpable axillary lymphadenopathy. Abdomen: Bowel sounds are present and normal.  There is no guarding, tenderness, or hepatosplenomegaly. Extremities: There is no clubbing, cyanosis. 2+ edema to RUE, patient is wearing a compression sleeve. Neurologic: There are no focal neurologic deficits. Lymphatics: There is no palpable peripheral lymphadenopathy. Musculoskeletal: The patient has full range of motion at all joints. There is no evidence of joint deformity or effusions. The patient has  right arm lymphedema extending from the fingers up to the shoulder area on the right. Psychological/psychiatric: There is no clinical evidence of anxiety, depression, or melancholy.     Lab data:      Results for orders placed or performed during the hospital encounter of 06/07/19   CBC WITH 3 PART DIFF     Status: None   Result Value Ref Range Status    WBC 10.6 4.5 - 13.0 K/uL Final    RBC 4.88 4.10 - 5.10 M/uL Final    HGB 12.6 12.0 - 16.0 g/dL Final    HCT 39.6 36 - 48 % Final    MCV 81.1 78 - 102 FL Final    MCH 25.8 25.0 - 35.0 PG Final    MCHC 31.8 31 - 37 g/dL Final    RDW 13.0 11.5 - 14.5 % Final    PLATELET 681 151 - 881 K/uL Final    NEUTROPHILS 66 40 - 70 % Final    MIXED CELLS 6 0.1 - 17 % Final    LYMPHOCYTES 28 14 - 44 % Final    ABS. NEUTROPHILS 6.9 1.8 - 9.5 K/UL Final    ABS. MIXED CELLS 0.7 0.0 - 2.3 K/uL Final    ABS. LYMPHOCYTES 3.0 1.1 - 5.9 K/UL Final     Comment: Test performed at [address] [place] [address] or Outpatient Infusion Center Location. Reviewed by Medical Director. DF AUTOMATED   Final         Assessment:     1. Malignant neoplasm of upper-outer quadrant of right breast in female, estrogen receptor positive ([facility] [address]    2. Arthritis    3. Lymphedema of upper extremity following lymphadenectomy    4. Muscle spasm        Plan:   Breast cancer: I have encouraged the patient to continue with her monthly breast self-examinations. At this time I will order a CA 27-29 level, and a comprehensive metabolic panel. The most recent CA 27-29 level was 34.6units/mL from 06/2019. The most recent mammogram in April 2018 was negative for malignancy. She was reminded today for her mammogram follow up. Arthritis: She is currently using Motrin 600mg PO every 6 hours for moderate pain and she was instructed to use Percocet for severe pain. Right arm and hand lymphedema:She is currently undergoing lymphedema therapy and continues to wear her new sleeve, glove, and chest garment. I advised her to to avoid lifting more than 10 pounds using the right arm and hand. She reports that the lymphedema has significantly improved since her last clinic visit.     Muscle Spasms: She is currently using Flexeril 5mg with instructions to take 1 tab TID as needed. She did not request for a new Rx today. I will have the patient return to the clinic again in 4 months or sooner if indicated. Orders Placed This Encounter    CBC WITH AUTOMATED DIFF     Standing Status:   Future     Number of Occurrences:   1     Standing Expiration Date:   10/11/2020    CA 27.29     Standing Status:   Future     Number of Occurrences:   1     Standing Expiration Date:   13/79/2558    METABOLIC PANEL, COMPREHENSIVE     Standing Status:   Future     Number of Occurrences:   1     Standing Expiration Date:   10/11/2020         Silvia Zuñiga NP  10/11/2019     I have assessed the patient independently and  agree with the full assessment as outlined.   Hannah Hill MD, Elysia Castelan

## 2019-10-11 NOTE — PATIENT INSTRUCTIONS
Preventing Lymphedema After Treatment for Breast Cancer: Care Instructions  Your Care Instructions    Lymphedema is a buildup of fluid in the soft tissues of the body. It can happen in the arm after breast cancer surgery to remove lymph nodes. If there are few or no lymph nodes, fluid can build up in the arm. It can also happen if the lymph system in an arm has been damaged. Infection, tumors, and scar tissue from radiation therapy to the armpit area also can cause fluid to build up. You may be able to avoid lymphedema or keep it under control by following the tips below. Make sure that you take good care of the skin on your arm and hand. Your skin acts as a barrier to keep out bacteria and prevent infection. It is also important not to overuse the muscles in the arm. And don't expose your arm to very hot or cold temperatures. Lymphedema can happen soon after breast cancer treatment. Or it may happen many years later. It may affect only part of your arm or hand. In some cases, it affects all of the arm. Make sure to follow these precautions even after you finish treatment. Do not ignore tightness or swelling in or around your arm or hand. You are less likely to have long-term problems if you get these symptoms treated right away. Follow-up care is a key part of your treatment and safety. Be sure to make and go to all appointments, and call your doctor if you are having problems. It's also a good idea to know your test results and keep a list of the medicines you take. How can you care for yourself at home?  [facility] care    · Keep your arm, hand, and armpit clean. Use a mild soap that does not dry out your skin.     · Moisturize your skin often.     · Take good care of the skin around your fingernails.  Do not bite or cut your cuticles.     · Ask your doctor how to handle any cuts, scratches, insect bites, or other injuries you may get.     · Use sunscreen and insect repellent outdoors to protect your skin from sunburn and insect bites.     · Use an electric razor to shave your armpit. It's less likely to cut or irritate your skin. Activity    · Don't wear clothing or jewelry that is tight on your arm or hand. Your doctor may advise you not to wear a watch or rings on the affected hand.     · Wear gloves when you do activities that could hurt the skin on your fingers or hand. Wear them when you garden, do yard work, wash dishes, and clean with chemicals. Use oven mitts when you handle hot food.     · Do not have blood drawn from the arm on the side of the lymph node surgery. Do not get injections (shots) or have an IV put in the affected arm.     · Do not allow a blood pressure cuff to be placed on that arm. If you are in the hospital, make sure you tell your nurse and other hospital staff about your condition.     · Do not expose your arm to very hot or very cold temperatures. For example, do not use hot tubs, saunas, or steam rooms. Do not use a heating pad or cold pack on that arm or shoulder.     · Rest your arm often when you do repeated movements, such as vacuum, scrub, or mop.     · Try to use your other arm to carry heavy things, such as grocery bags. Avoid shoulder straps when you carry a briefcase or purse. Carry your purse on your good arm.     · Ask your doctor about wearing a compression sleeve and glove (gauntlet). Your doctor may want you to wear these when you exercise or when you fly in an airplane. They can help keep fluid from pooling in your arm and hand. Exercise    · Ask your doctor about exercises for your arm and hand. Your doctor may recommend that you see a physical therapist. This person can teach you how to do self-massage to move fluid out of your arm.     · Check with your doctor before you start exercises that use the arm. This includes tennis, rowing, or weight lifting. Your doctor can help you find an activity level that is right for you. When should you call for help?   Call your doctor now or seek immediate medical care if:    · You have signs of infection, such as:  ? Increased pain, swelling, warmth, or redness. ? Red streaks leading from the area. ? Pus draining from the area. ? A fever.    Watch closely for changes in your health, and be sure to contact your doctor if:    · You have new or worse symptoms from lymphedema.     · You do not get better as expected. Where can you learn more? Go to http://alexsandra-rachel.info/. Enter G546 in the search box to learn more about \"Preventing Lymphedema After Treatment for Breast Cancer: Care Instructions. \"  Current as of: December 19, 2018  Content Version: 12.2  © 0292-0566 [employer]. Care instructions adapted under license by [employer] (which disclaims liability or warranty for this information). If you have questions about a medical condition or this instruction, always ask your healthcare professional. [address] any warranty or liability for your use of this information.

## 2019-10-12 LAB
ALBUMIN SERPL-MCNC: 4.1 G/DL (ref 3.5–4.8)
ALBUMIN/GLOB SERPL: 1.3 {RATIO} (ref 1.2–2.2)
ALP SERPL-CCNC: 133 IU/L (ref 39–117)
ALT SERPL-CCNC: 13 IU/L (ref 0–32)
AST SERPL-CCNC: 10 IU/L (ref 0–40)
BASOPHILS # BLD AUTO: 0.1 X10E3/UL (ref 0–0.2)
BASOPHILS NFR BLD AUTO: 1 %
BILIRUB SERPL-MCNC: <0.2 MG/DL (ref 0–1.2)
BUN SERPL-MCNC: 21 MG/DL (ref 8–27)
BUN/CREAT SERPL: 17 (ref 12–28)
CALCIUM SERPL-MCNC: 9.2 MG/DL (ref 8.7–10.3)
CANCER AG27-29 SERPL-ACNC: 31.3 U/ML (ref 0–38.6)
CHLORIDE SERPL-SCNC: 103 MMOL/L (ref 96–106)
CO2 SERPL-SCNC: 18 MMOL/L (ref 20–29)
CREAT SERPL-MCNC: 1.26 MG/DL (ref 0.57–1)
EOSINOPHIL # BLD AUTO: 0.3 X10E3/UL (ref 0–0.4)
EOSINOPHIL NFR BLD AUTO: 3 %
ERYTHROCYTE [DISTWIDTH] IN BLOOD BY AUTOMATED COUNT: 14.6 % (ref 12.3–15.4)
GLOBULIN SER CALC-MCNC: 3.2 G/DL (ref 1.5–4.5)
GLUCOSE SERPL-MCNC: 148 MG/DL (ref 65–99)
HCT VFR BLD AUTO: 38.3 % (ref 34–46.6)
HGB BLD-MCNC: 13 G/DL (ref 11.1–15.9)
IMM GRANULOCYTES # BLD AUTO: 0 X10E3/UL (ref 0–0.1)
IMM GRANULOCYTES NFR BLD AUTO: 0 %
LYMPHOCYTES # BLD AUTO: 3.1 X10E3/UL (ref 0.7–3.1)
LYMPHOCYTES NFR BLD AUTO: 32 %
MCH RBC QN AUTO: 25.8 PG (ref 26.6–33)
MCHC RBC AUTO-ENTMCNC: 33.9 G/DL (ref 31.5–35.7)
MCV RBC AUTO: 76 FL (ref 79–97)
MONOCYTES # BLD AUTO: 0.7 X10E3/UL (ref 0.1–0.9)
MONOCYTES NFR BLD AUTO: 7 %
NEUTROPHILS # BLD AUTO: 5.7 X10E3/UL (ref 1.4–7)
NEUTROPHILS NFR BLD AUTO: 57 %
PLATELET # BLD AUTO: 327 X10E3/UL (ref 150–450)
POTASSIUM SERPL-SCNC: 4.5 MMOL/L (ref 3.5–5.2)
PROT SERPL-MCNC: 7.3 G/DL (ref 6–8.5)
RBC # BLD AUTO: 5.04 X10E6/UL (ref 3.77–5.28)
SODIUM SERPL-SCNC: 139 MMOL/L (ref 134–144)
WBC # BLD AUTO: 10 X10E3/UL (ref 3.4–10.8)

## 2019-11-07 ENCOUNTER — HOSPITAL ENCOUNTER (OUTPATIENT)
Dept: MAMMOGRAPHY | Age: 64
Discharge: HOME | End: 2019-11-07
Attending: OBSTETRICS & GYNECOLOGY
Payer: COMMERCIAL

## 2019-11-07 DIAGNOSIS — Z12.31 VISIT FOR SCREENING MAMMOGRAM: ICD-10-CM

## 2019-11-07 PROCEDURE — 77063 BREAST TOMOSYNTHESIS BI: CPT

## 2020-01-18 ENCOUNTER — APPOINTMENT (OUTPATIENT)
Dept: CT IMAGING | Age: 65
DRG: 872 | End: 2020-01-18
Attending: EMERGENCY MEDICINE
Payer: COMMERCIAL

## 2020-01-18 ENCOUNTER — HOSPITAL ENCOUNTER (INPATIENT)
Age: 65
LOS: 4 days | Discharge: HOME | DRG: 872 | End: 2020-01-22
Attending: EMERGENCY MEDICINE | Admitting: HOSPITALIST
Payer: COMMERCIAL

## 2020-01-18 ENCOUNTER — APPOINTMENT (OUTPATIENT)
Dept: GENERAL RADIOLOGY | Age: 65
DRG: 872 | End: 2020-01-18
Attending: EMERGENCY MEDICINE
Payer: COMMERCIAL

## 2020-01-18 DIAGNOSIS — N20.0 STAGHORN RENAL CALCULUS: ICD-10-CM

## 2020-01-18 DIAGNOSIS — N12 PYELONEPHRITIS: ICD-10-CM

## 2020-01-18 DIAGNOSIS — A41.9 SEPSIS WITHOUT ACUTE ORGAN DYSFUNCTION, DUE TO UNSPECIFIED ORGANISM (HCC): Primary | ICD-10-CM

## 2020-01-18 LAB
ALBUMIN SERPL-MCNC: 3.3 G/DL (ref 3.4–5)
ALBUMIN/GLOB SERPL: 0.8 {RATIO} (ref 0.8–1.7)
ALP SERPL-CCNC: 87 U/L (ref 45–117)
ALT SERPL-CCNC: 25 U/L (ref 13–56)
ANION GAP SERPL CALC-SCNC: 5 MMOL/L (ref 3–18)
APPEARANCE UR: ABNORMAL
AST SERPL-CCNC: 22 U/L (ref 10–38)
BACTERIA URNS QL MICRO: ABNORMAL /HPF
BASOPHILS # BLD: 0 K/UL (ref 0–0.1)
BASOPHILS NFR BLD: 0 % (ref 0–2)
BILIRUB SERPL-MCNC: 0.3 MG/DL (ref 0.2–1)
BILIRUB UR QL: NEGATIVE
BUN SERPL-MCNC: 33 MG/DL (ref 7–18)
BUN/CREAT SERPL: 18 (ref 12–20)
CALCIUM SERPL-MCNC: 9.3 MG/DL (ref 8.5–10.1)
CHLORIDE SERPL-SCNC: 111 MMOL/L (ref 100–111)
CO2 SERPL-SCNC: 27 MMOL/L (ref 21–32)
COLOR UR: YELLOW
CREAT SERPL-MCNC: 1.85 MG/DL (ref 0.6–1.3)
DIFFERENTIAL METHOD BLD: ABNORMAL
EOSINOPHIL # BLD: 0.2 K/UL (ref 0–0.4)
EOSINOPHIL NFR BLD: 2 % (ref 0–5)
EPITH CASTS URNS QL MICRO: ABNORMAL /LPF (ref 0–5)
ERYTHROCYTE [DISTWIDTH] IN BLOOD BY AUTOMATED COUNT: 14.5 % (ref 11.6–14.5)
GLOBULIN SER CALC-MCNC: 4.3 G/DL (ref 2–4)
GLUCOSE SERPL-MCNC: 218 MG/DL (ref 74–99)
GLUCOSE UR STRIP.AUTO-MCNC: NEGATIVE MG/DL
HCT VFR BLD AUTO: 40.4 % (ref 35–45)
HGB BLD-MCNC: 13.2 G/DL (ref 12–16)
HGB UR QL STRIP: ABNORMAL
KETONES UR QL STRIP.AUTO: ABNORMAL MG/DL
LACTATE BLD-SCNC: 1.08 MMOL/L (ref 0.4–2)
LACTATE BLD-SCNC: 2.45 MMOL/L (ref 0.4–2)
LEUKOCYTE ESTERASE UR QL STRIP.AUTO: ABNORMAL
LYMPHOCYTES # BLD: 0.8 K/UL (ref 0.9–3.6)
LYMPHOCYTES NFR BLD: 6 % (ref 21–52)
MCH RBC QN AUTO: 25.7 PG (ref 24–34)
MCHC RBC AUTO-ENTMCNC: 32.7 G/DL (ref 31–37)
MCV RBC AUTO: 78.8 FL (ref 74–97)
MONOCYTES # BLD: 0.2 K/UL (ref 0.05–1.2)
MONOCYTES NFR BLD: 2 % (ref 3–10)
NEUTS SEG # BLD: 11.3 K/UL (ref 1.8–8)
NEUTS SEG NFR BLD: 90 % (ref 40–73)
NITRITE UR QL STRIP.AUTO: NEGATIVE
PH UR STRIP: 5 [PH] (ref 5–8)
PLATELET # BLD AUTO: 269 K/UL (ref 135–420)
PMV BLD AUTO: 9.8 FL (ref 9.2–11.8)
POTASSIUM SERPL-SCNC: 4.2 MMOL/L (ref 3.5–5.5)
PROT SERPL-MCNC: 7.6 G/DL (ref 6.4–8.2)
PROT UR STRIP-MCNC: 100 MG/DL
RBC # BLD AUTO: 5.13 M/UL (ref 4.2–5.3)
RBC #/AREA URNS HPF: ABNORMAL /HPF (ref 0–5)
SODIUM SERPL-SCNC: 143 MMOL/L (ref 136–145)
SP GR UR REFRACTOMETRY: 1.01 (ref 1–1.03)
UROBILINOGEN UR QL STRIP.AUTO: 0.2 EU/DL (ref 0.2–1)
WBC # BLD AUTO: 12.5 K/UL (ref 4.6–13.2)
WBC URNS QL MICRO: ABNORMAL /HPF (ref 0–4)

## 2020-01-18 PROCEDURE — 74011000250 HC RX REV CODE- 250: Performed by: STUDENT IN AN ORGANIZED HEALTH CARE EDUCATION/TRAINING PROGRAM

## 2020-01-18 PROCEDURE — 83605 ASSAY OF LACTIC ACID: CPT

## 2020-01-18 PROCEDURE — 96375 TX/PRO/DX INJ NEW DRUG ADDON: CPT

## 2020-01-18 PROCEDURE — 93005 ELECTROCARDIOGRAM TRACING: CPT

## 2020-01-18 PROCEDURE — 87040 BLOOD CULTURE FOR BACTERIA: CPT

## 2020-01-18 PROCEDURE — 74011250637 HC RX REV CODE- 250/637: Performed by: EMERGENCY MEDICINE

## 2020-01-18 PROCEDURE — 65660000000 HC RM CCU STEPDOWN

## 2020-01-18 PROCEDURE — 81001 URINALYSIS AUTO W/SCOPE: CPT

## 2020-01-18 PROCEDURE — 87077 CULTURE AEROBIC IDENTIFY: CPT

## 2020-01-18 PROCEDURE — 87086 URINE CULTURE/COLONY COUNT: CPT

## 2020-01-18 PROCEDURE — 99285 EMERGENCY DEPT VISIT HI MDM: CPT

## 2020-01-18 PROCEDURE — 96374 THER/PROPH/DIAG INJ IV PUSH: CPT

## 2020-01-18 PROCEDURE — 74011250636 HC RX REV CODE- 250/636: Performed by: EMERGENCY MEDICINE

## 2020-01-18 PROCEDURE — 74176 CT ABD & PELVIS W/O CONTRAST: CPT

## 2020-01-18 PROCEDURE — 85025 COMPLETE CBC W/AUTO DIFF WBC: CPT

## 2020-01-18 PROCEDURE — 74011250636 HC RX REV CODE- 250/636

## 2020-01-18 PROCEDURE — 87186 SC STD MICRODIL/AGAR DIL: CPT

## 2020-01-18 PROCEDURE — 71045 X-RAY EXAM CHEST 1 VIEW: CPT

## 2020-01-18 PROCEDURE — 74011250636 HC RX REV CODE- 250/636: Performed by: STUDENT IN AN ORGANIZED HEALTH CARE EDUCATION/TRAINING PROGRAM

## 2020-01-18 PROCEDURE — 80053 COMPREHEN METABOLIC PANEL: CPT

## 2020-01-18 PROCEDURE — 74011000250 HC RX REV CODE- 250: Performed by: EMERGENCY MEDICINE

## 2020-01-18 RX ORDER — ONDANSETRON 2 MG/ML
4 INJECTION INTRAMUSCULAR; INTRAVENOUS
Status: COMPLETED | OUTPATIENT
Start: 2020-01-18 | End: 2020-01-18

## 2020-01-18 RX ORDER — ACETAMINOPHEN 325 MG/1
650 TABLET ORAL
Status: COMPLETED | OUTPATIENT
Start: 2020-01-18 | End: 2020-01-18

## 2020-01-18 RX ORDER — SODIUM CHLORIDE 0.9 % (FLUSH) 0.9 %
5-10 SYRINGE (ML) INJECTION AS NEEDED
Status: DISCONTINUED | OUTPATIENT
Start: 2020-01-18 | End: 2020-01-22 | Stop reason: HOSPADM

## 2020-01-18 RX ADMIN — CEFTRIAXONE SODIUM 1 G: 1 INJECTION, POWDER, FOR SOLUTION INTRAMUSCULAR; INTRAVENOUS at 18:49

## 2020-01-18 RX ADMIN — ONDANSETRON 4 MG: 2 INJECTION INTRAMUSCULAR; INTRAVENOUS at 19:43

## 2020-01-18 RX ADMIN — CEFEPIME 2 G: 2 INJECTION, POWDER, FOR SOLUTION INTRAVENOUS at 00:37

## 2020-01-18 RX ADMIN — SODIUM CHLORIDE 3267 ML: 900 INJECTION, SOLUTION INTRAVENOUS at 18:48

## 2020-01-18 RX ADMIN — ACETAMINOPHEN 650 MG: 325 TABLET ORAL at 18:49

## 2020-01-18 NOTE — ED PROVIDER NOTES
EMERGENCY DEPARTMENT HISTORY AND PHYSICAL EXAM    6:29 PM      Date: 1/18/2020  Patient Name: Severo Wang    History of Presenting Illness     Chief Complaint   Patient presents with    Flank Pain    Nausea         History Provided By: Patient  Location/Duration/Severity/Modifying factors   HPI    Ms Daniel Hernandez is a 57-year-old -American female with past medical history significant for UTI, type 2 diabetes, staghorn kidney stones, obstructive uropathy, and ureteral calculus who presents to the ED with a chief complaint of bilateral flank pain, onset 12 hours ago. Patient states she has been experiencing increased urinary frequency for the previous 48 hours. She states that over the past 24 hours she has developed fever and chills. Patient describes earlier today she developed bilateral flank pain, burning on urination, and nausea with 2 episodes of vomiting. Patient states the symptoms are similar to her previous episodes of infected kidney stones. Patient denies any chest pain, shortness of breath, altered mental status, confusion, or headache. PCP: Carlos Younger DO    Current Facility-Administered Medications   Medication Dose Route Frequency Provider Last Rate Last Dose    sodium chloride (NS) flush 5-10 mL  5-10 mL IntraVENous PRN Luis Fernando Obrien MD        ondansetron [facility]) injection 4 mg  4 mg IntraVENous NOW Luis Fernando Obrien MD         Current Outpatient Medications   Medication Sig Dispense Refill    cefdinir (OMNICEF) 300 mg capsule Take 1 Cap by mouth two (2) times a day. 14 Cap 0    atorvastatin (LIPITOR) 40 mg tablet       ibuprofen (MOTRIN) 600 mg tablet Take 1 Tab by mouth every six (6) hours as needed for Pain. 120 Tab 0    letrozole (FEMARA) 2.5 mg tablet Take 1 Tab by mouth every evening. 90 Tab 3    potassium citrate (UROCIT-K) 15 mEq TbER tablet Take 1 Tab by mouth three (3) times daily (with meals). 180 Tab 6    PROLIA 60 mg/mL injection 60 mg every 6 months.       HUMALOG U-100 INSULIN 100 unit/mL injection 100 Units by SubCUTAneous route daily. Pt has insulin pump      losartan (COZAAR) 100 mg tablet Take 100 mg by mouth daily. Indications: takes in the evening      levothyroxine (SYNTHROID) 88 mcg tablet Take 88 mcg by mouth daily.  multivitamin (ONE A DAY) tablet Take 1 Tab by mouth daily. Past History     Past Medical History:  Past Medical History:   Diagnosis Date    Arthritis     Arthropathy, unspecified, site unspecified     Asthma 11/16/2018    Resolved, per patient    Breast CA ([facility] [address] 2/28/2014    Right Breast  1-14-19  resolved with surgery    Chemotherapy convalescence or palliative care     resolved    CKD (chronic kidney disease)     Diabetes mellitus     Family history of breast cancer     GERD (gastroesophageal reflux disease) 11/16/2018    Resolved, per patient    Hematuria     Hiatus hernia syndrome     resolved, per pt.  8-6-18    Hypertension     Insulin pump in place     Kidney stone     Lymphedema     right arm    Microhematuria     Morbid obesity ([facility] [address] 11/16/2018    BMI = 47.4    Nausea with vomiting     Postop N/V    Osteopenia     Radiation therapy complication     6-22-29 resolved, per patient    Recurrent UTI     Renal colic     S/P breast biopsy - right breast calcifications 10/18/10     Shortness of breath     With exertion.  8-6-18  pt denies     Staghorn calculus     Struvite kidney stones     Thyroid dysfunction     GOITER    Urethral diverticulum     Urine leukocytes     UTI (lower urinary tract infection)        Past Surgical History:  Past Surgical History:   Procedure Laterality Date    HX BREAST BIOPSY  10/18/10    stereotactic - right breast calcifications    HX BREAST BIOPSY  2/14/2014    RIGHT BREAST BIOPSY performed by Ismael Waterman MD at [address] [place] [address] HX CYST REMOVAL  1997    Bilateral breast.     HX GI      colonoscopy    HX GYN      urethra reconstruction x3    HX HYSTERECTOMY  HX MODIFIED RADICAL MASTECTOMY  3/21/2014    RIGHT MODIFIED RADICAL MASTECTOMY SENTINAL LYMPH NODE BIOPSY performed by Buzz Mcmanus MD at [facility] MAIN OR    HX TUBAL LIGATION      HX UROLOGICAL      4 diverticuli repaired    HX UROLOGICAL  03/29/2011    Cysto, percutaneous nephrolithotomy, antegrade URS, antegrade pyelogram.  Dr. Kwesi Alvarez, [facility].    HX UROLOGICAL  12/11/2009    Cysto, flexible URS, right RPG, lasertripsy, JJ stent placement. Dr. Kwesi Alvarez, [facility].    HX UROLOGICAL  11/20/2009    Second look right attempted percutaneous nephrolithotomy. Dr. Kwesi Alvarez, [facility].    HX UROLOGICAL  10/23/2009    Cysto, right percutaneous nephrolithotomy. Dr. Kwesi Alvarez, [facility].    HX UROLOGICAL  08/07/2018    Cysto, bilat RPG, right URS, HLL, right JJ stent placement, right nephrostomy tube removal, Dr. Edwardo Mullins, [facility]    HX UROLOGICAL  09/04/2018    cysto, right JJ stent removal, bilat RPG. Dr Edwardo Mullins. [id number].  HX UROLOGICAL  11/20/2018    cysto, bilat RPG, left URS, laser lithotripsy, left JJ stent place. Dr. Edwardo Mullins. [id number].   UROLOGICAL  01/25/2019    Left PCNL and removal of left JJ stent. Dr. Edwardo Mullins at [address].  HX VASCULAR ACCESS  2014    IR GUIDE DIL URET TRACT EXIST INCL NEW ACCESS W TUBE PLACMENT LT  1/25/2019       Family History:  Family History   Problem Relation Age of Onset    Breast Cancer Sister 79    Diabetes Mother     Hypertension Mother     Breast Cancer Sister 39    Hypertension Other         Parent    Diabetes Other         parent    Stroke Other         parent    Hypertension Other         sibling    Diabetes Other         sibling    Osteoporosis Other         sibling    Breast Cancer Sister     Diabetes Sister     Diabetes Sister     Diabetes Sister        Social History:  Social History     Tobacco Use    Smoking status: Never Smoker    Smokeless tobacco: Never Used   Substance Use Topics    Alcohol use: Yes     Frequency: Never     Comment: occasionally    Drug use: No       Allergies:   Allergies Allergen Reactions    Ampicillin Hives         Review of Systems     Review of Systems   Constitutional: Positive for chills and fever. Negative for diaphoresis and fatigue. HENT: Negative for congestion and rhinorrhea. Eyes: Negative for pain and redness. Respiratory: Negative for cough, chest tightness, shortness of breath and wheezing. Cardiovascular: Negative for chest pain, palpitations and leg swelling. Gastrointestinal: Positive for abdominal pain, nausea and vomiting. Negative for blood in stool and diarrhea. Genitourinary: Positive for dysuria, flank pain (Bilateral), frequency and urgency. Negative for hematuria. Musculoskeletal: Negative for myalgias. Skin: Negative for pallor and rash. Neurological: Negative for dizziness, light-headedness and headaches. Psychiatric/Behavioral: Negative for confusion. Physical Exam     Visit Vitals  /70 (BP 1 Location: Left arm, BP Patient Position: At rest;Sitting)   Pulse (!) 142   Temp (!) 104.1 °F (40.1 °C)   Resp 22   Ht 4' 11\" (1.499 m)   Wt 108.9 kg (240 lb)   SpO2 95%   BMI 48.47 kg/m²       Physical Exam  Vitals signs and nursing note reviewed. Constitutional:       Appearance: She is ill-appearing. She is not diaphoretic. HENT:      Head: Normocephalic and atraumatic. Mouth/Throat:      Mouth: Mucous membranes are dry. Eyes:      Conjunctiva/sclera: Conjunctivae normal.   Neck:      Musculoskeletal: Normal range of motion and neck supple. Cardiovascular:      Rate and Rhythm: Regular rhythm. Tachycardia present. Pulses: Normal pulses. Heart sounds: Normal heart sounds. Pulmonary:      Effort: Pulmonary effort is normal.      Breath sounds: Normal breath sounds. Abdominal:      General: There is no distension. Palpations: There is no mass. Tenderness: There is tenderness (Moderate suprapubic abdominal tenderness to palpation). There is right CVA tenderness and left CVA tenderness. Musculoskeletal:      Right lower leg: No edema. Left lower leg: No edema. Skin:     General: Skin is warm and dry. Capillary Refill: Capillary refill takes less than 2 seconds. Neurological:      Mental Status: She is alert and oriented to person, place, and time. Psychiatric:         Mood and Affect: Mood normal.         Behavior: Behavior normal.           Diagnostic Study Results     Labs -  Recent Results (from the past 12 hour(s))   CBC WITH AUTOMATED DIFF    Collection Time: 01/18/20  6:00 PM   Result Value Ref Range    WBC 12.5 4.6 - 13.2 K/uL    RBC 5.13 4.20 - 5.30 M/uL    HGB 13.2 12.0 - 16.0 g/dL    HCT 40.4 35.0 - 45.0 %    MCV 78.8 74.0 - 97.0 FL    MCH 25.7 24.0 - 34.0 PG    MCHC 32.7 31.0 - 37.0 g/dL    RDW 14.5 11.6 - 14.5 %    PLATELET 878 193 - 081 K/uL    MPV 9.8 9.2 - 11.8 FL    NEUTROPHILS 90 (H) 40 - 73 %    LYMPHOCYTES 6 (L) 21 - 52 %    MONOCYTES 2 (L) 3 - 10 %    EOSINOPHILS 2 0 - 5 %    BASOPHILS 0 0 - 2 %    ABS. NEUTROPHILS 11.3 (H) 1.8 - 8.0 K/UL    ABS. LYMPHOCYTES 0.8 (L) 0.9 - 3.6 K/UL    ABS. MONOCYTES 0.2 0.05 - 1.2 K/UL    ABS. EOSINOPHILS 0.2 0.0 - 0.4 K/UL    ABS. BASOPHILS 0.0 0.0 - 0.1 K/UL    DF AUTOMATED     EKG, 12 LEAD, INITIAL    Collection Time: 01/18/20  6:02 PM   Result Value Ref Range    Ventricular Rate 141 BPM    Atrial Rate 141 BPM    P-R Interval 122 ms    QRS Duration 76 ms    Q-T Interval 284 ms    QTC Calculation (Bezet) 434 ms    Calculated P Axis 44 degrees    Calculated R Axis 46 degrees    Calculated T Axis 21 degrees    Diagnosis       Sinus tachycardia  Otherwise normal ECG  When compared with ECG of 23-SEP-2015 17:46,  Vent.  rate has increased BY  54 BPM     POC LACTIC ACID    Collection Time: 01/18/20  6:15 PM   Result Value Ref Range    Lactic Acid (POC) 2.45 (HH) 0.40 - 2.00 mmol/L       Radiologic Studies -   XR CHEST PORT    (Results Pending)   CT ABD PELV WO CONT    (Results Pending)         Medical Decision Making   I am the first provider for this patient. I reviewed the vital signs, available nursing notes, past medical history, past surgical history, family history and social history. Vital Signs-Reviewed the patient's vital signs. EKG: Sinus tachycardia. 141 bpm. No evidence of acute ischemia. Records Reviewed: Nursing Notes, Old Medical Records, Previous Radiology Studies and Previous Laboratory Studies (Time of Review: 6:29 PM)    ED Course: Progress Notes, Reevaluation, and Consults:     6:30 PM Patient seen and evaluated. Tachycardic, febrile. Patient has bilateral CVA tenderness, suprapubic tenderness to palpation. Lactate>2  Will begin septic work-up, 30 cc/kg bolus, Rocephin antibiotics, and CT abdomen/pelvis imaging. Patient to be reevaluated. Concern for sepsis due to obstructive stone  7:00 PM patient signed out to Dr. Luca Ochoa pending further work-up. Provider Notes (Medical Decision Making):   [facility]    Ms Sivakumar Mcgill is a 42-year-old -American female with past medical history significant for UTI, type 2 diabetes, staghorn kidney stones, obstructive uropathy, and ureteral calculus who presents to the ED with a chief complaint of bilateral flank pain, onset 12 hours ago. Patient states she has been experiencing increased urinary frequency for the previous 48 hours. She states that over the past 24 hours she has developed fever and chills. Patient describes earlier today she developed bilateral flank pain, burning on urination, and nausea with 2 episodes of vomiting. Patient states the symptoms are similar to her previous episodes of infected kidney stones. Patient febrile and tachycardic on arrival  Concern for sepsis due to infected stone   Patient's EKG shows sinus tachycardia. Her lactic acid is elevated at 2.45. Remainder of work-up is pending. Patient to receive Rocephin antibiotics, 30 cc/kg bolus. Will obtain abdomen/pelvis CT and discuss case with nephrology.   Sepsis bundle ordered. 7PM: Care of patient signed out to Dr. Kacey Cedeno pending work-up. ATTENDING ATTESTATION:  Patient presents septic due to possibly infected stone. Septic workup initiated. I personally saw and examined the patient. I have reviewed and agree with the residents findings, including all diagnostic interpretations, and plans as written. I was present during the key portions of separately billed procedures. Ann Greene MD      Procedures      Diagnosis     Clinical Impression: Bilateral Flank Pain    Disposition: Patient signed out to Dr Kacey Cedeno pending continued workup. Follow-up Information    None          Patient's Medications   Start Taking    No medications on file   Continue Taking    ATORVASTATIN (LIPITOR) 40 MG TABLET        CEFDINIR (OMNICEF) 300 MG CAPSULE    Take 1 Cap by mouth two (2) times a day. HUMALOG U-100 INSULIN 100 UNIT/ML INJECTION    100 Units by SubCUTAneous route daily. Pt has insulin pump    IBUPROFEN (MOTRIN) 600 MG TABLET    Take 1 Tab by mouth every six (6) hours as needed for Pain. LETROZOLE (FEMARA) 2.5 MG TABLET    Take 1 Tab by mouth every evening. LEVOTHYROXINE (SYNTHROID) 88 MCG TABLET    Take 88 mcg by mouth daily. LOSARTAN (COZAAR) 100 MG TABLET    Take 100 mg by mouth daily. Indications: takes in the evening    MULTIVITAMIN (ONE A DAY) TABLET    Take 1 Tab by mouth daily. POTASSIUM CITRATE (UROCIT-K) 15 MEQ TBER TABLET    Take 1 Tab by mouth three (3) times daily (with meals). PROLIA 60 MG/ML INJECTION    60 mg every 6 months. These Medications have changed    No medications on file   Stop Taking    No medications on file     Disclaimer: Sections of this note are dictated using utilizing voice recognition software. Minor typographical errors may be present. If questions arise, please do not hesitate to contact me or call our department.

## 2020-01-18 NOTE — ED TRIAGE NOTES
Patient brought in by EMS from home for evaluation of bilateral flank pain with nausea and vomiting. Patient also complaints of chills and burning with urination. Per patient 'It feels like my past kidney infection. \"

## 2020-01-19 PROBLEM — Z96.41 INSULIN PUMP STATUS: Chronic | Status: ACTIVE | Noted: 2020-01-19

## 2020-01-19 LAB
ANION GAP SERPL CALC-SCNC: 5 MMOL/L (ref 3–18)
ATRIAL RATE: 141 BPM
BASOPHILS # BLD: 0 K/UL (ref 0–0.1)
BASOPHILS NFR BLD: 0 % (ref 0–2)
BUN SERPL-MCNC: 27 MG/DL (ref 7–18)
BUN/CREAT SERPL: 16 (ref 12–20)
CALCIUM SERPL-MCNC: 8.1 MG/DL (ref 8.5–10.1)
CALCULATED P AXIS, ECG09: 44 DEGREES
CALCULATED R AXIS, ECG10: 46 DEGREES
CALCULATED T AXIS, ECG11: 21 DEGREES
CHLORIDE SERPL-SCNC: 112 MMOL/L (ref 100–111)
CO2 SERPL-SCNC: 23 MMOL/L (ref 21–32)
CREAT SERPL-MCNC: 1.65 MG/DL (ref 0.6–1.3)
DIAGNOSIS, 93000: NORMAL
DIFFERENTIAL METHOD BLD: ABNORMAL
EOSINOPHIL # BLD: 0.1 K/UL (ref 0–0.4)
EOSINOPHIL NFR BLD: 0 % (ref 0–5)
ERYTHROCYTE [DISTWIDTH] IN BLOOD BY AUTOMATED COUNT: 14.3 % (ref 11.6–14.5)
GLUCOSE BLD STRIP.AUTO-MCNC: 206 MG/DL (ref 70–110)
GLUCOSE BLD STRIP.AUTO-MCNC: 261 MG/DL (ref 70–110)
GLUCOSE SERPL-MCNC: 323 MG/DL (ref 74–99)
HCT VFR BLD AUTO: 33.1 % (ref 35–45)
HGB BLD-MCNC: 10.9 G/DL (ref 12–16)
LYMPHOCYTES # BLD: 2.3 K/UL (ref 0.9–3.6)
LYMPHOCYTES NFR BLD: 14 % (ref 21–52)
MCH RBC QN AUTO: 25.8 PG (ref 24–34)
MCHC RBC AUTO-ENTMCNC: 32.9 G/DL (ref 31–37)
MCV RBC AUTO: 78.4 FL (ref 74–97)
MONOCYTES # BLD: 1 K/UL (ref 0.05–1.2)
MONOCYTES NFR BLD: 6 % (ref 3–10)
NEUTS SEG # BLD: 12.9 K/UL (ref 1.8–8)
NEUTS SEG NFR BLD: 80 % (ref 40–73)
P-R INTERVAL, ECG05: 122 MS
PLATELET # BLD AUTO: 226 K/UL (ref 135–420)
PMV BLD AUTO: 9.6 FL (ref 9.2–11.8)
POTASSIUM SERPL-SCNC: 4.1 MMOL/L (ref 3.5–5.5)
Q-T INTERVAL, ECG07: 284 MS
QRS DURATION, ECG06: 76 MS
QTC CALCULATION (BEZET), ECG08: 434 MS
RBC # BLD AUTO: 4.22 M/UL (ref 4.2–5.3)
SODIUM SERPL-SCNC: 140 MMOL/L (ref 136–145)
VENTRICULAR RATE, ECG03: 141 BPM
WBC # BLD AUTO: 16.3 K/UL (ref 4.6–13.2)

## 2020-01-19 PROCEDURE — 85025 COMPLETE CBC W/AUTO DIFF WBC: CPT

## 2020-01-19 PROCEDURE — 82962 GLUCOSE BLOOD TEST: CPT

## 2020-01-19 PROCEDURE — 65660000000 HC RM CCU STEPDOWN

## 2020-01-19 PROCEDURE — 80048 BASIC METABOLIC PNL TOTAL CA: CPT

## 2020-01-19 PROCEDURE — 74011000250 HC RX REV CODE- 250: Performed by: HOSPITALIST

## 2020-01-19 PROCEDURE — 74011250637 HC RX REV CODE- 250/637: Performed by: HOSPITALIST

## 2020-01-19 PROCEDURE — 74011250636 HC RX REV CODE- 250/636: Performed by: HOSPITALIST

## 2020-01-19 RX ORDER — HEPARIN SODIUM 5000 [USP'U]/ML
5000 INJECTION, SOLUTION INTRAVENOUS; SUBCUTANEOUS EVERY 8 HOURS
Status: DISCONTINUED | OUTPATIENT
Start: 2020-01-19 | End: 2020-01-22 | Stop reason: HOSPADM

## 2020-01-19 RX ORDER — TRAMADOL HYDROCHLORIDE 50 MG/1
100 TABLET ORAL
Status: DISCONTINUED | OUTPATIENT
Start: 2020-01-19 | End: 2020-01-22 | Stop reason: HOSPADM

## 2020-01-19 RX ORDER — SODIUM CHLORIDE, SODIUM LACTATE, POTASSIUM CHLORIDE, CALCIUM CHLORIDE 600; 310; 30; 20 MG/100ML; MG/100ML; MG/100ML; MG/100ML
125 INJECTION, SOLUTION INTRAVENOUS CONTINUOUS
Status: DISCONTINUED | OUTPATIENT
Start: 2020-01-19 | End: 2020-01-22

## 2020-01-19 RX ORDER — SODIUM CHLORIDE 0.9 % (FLUSH) 0.9 %
5-40 SYRINGE (ML) INJECTION AS NEEDED
Status: DISCONTINUED | OUTPATIENT
Start: 2020-01-19 | End: 2020-01-22 | Stop reason: HOSPADM

## 2020-01-19 RX ORDER — LEVOTHYROXINE SODIUM 88 UG/1
88 TABLET ORAL
Status: DISCONTINUED | OUTPATIENT
Start: 2020-01-20 | End: 2020-01-22 | Stop reason: HOSPADM

## 2020-01-19 RX ORDER — ATORVASTATIN CALCIUM 40 MG/1
40 TABLET, FILM COATED ORAL
Status: DISCONTINUED | OUTPATIENT
Start: 2020-01-19 | End: 2020-01-22 | Stop reason: HOSPADM

## 2020-01-19 RX ORDER — INSULIN LISPRO 100 [IU]/ML
10 INJECTION, SOLUTION INTRAVENOUS; SUBCUTANEOUS
Status: DISCONTINUED | OUTPATIENT
Start: 2020-01-19 | End: 2020-01-20

## 2020-01-19 RX ORDER — TRAMADOL HYDROCHLORIDE 50 MG/1
100 TABLET ORAL
Status: DISCONTINUED | OUTPATIENT
Start: 2020-01-19 | End: 2020-01-19

## 2020-01-19 RX ORDER — PRASTERONE 6.5 MG/1
1 INSERT VAGINAL
COMMUNITY
Start: 2020-01-15 | End: 2020-10-27

## 2020-01-19 RX ORDER — SODIUM CHLORIDE 0.9 % (FLUSH) 0.9 %
5-40 SYRINGE (ML) INJECTION EVERY 8 HOURS
Status: DISCONTINUED | OUTPATIENT
Start: 2020-01-19 | End: 2020-01-22 | Stop reason: HOSPADM

## 2020-01-19 RX ORDER — LOSARTAN POTASSIUM 50 MG/1
50 TABLET ORAL DAILY
Status: DISCONTINUED | OUTPATIENT
Start: 2020-01-20 | End: 2020-01-22 | Stop reason: HOSPADM

## 2020-01-19 RX ORDER — THERA TABS 400 MCG
1 TAB ORAL DAILY
Status: DISCONTINUED | OUTPATIENT
Start: 2020-01-20 | End: 2020-01-22 | Stop reason: HOSPADM

## 2020-01-19 RX ORDER — ACETAMINOPHEN 325 MG/1
650 TABLET ORAL
Status: DISCONTINUED | OUTPATIENT
Start: 2020-01-19 | End: 2020-01-22 | Stop reason: HOSPADM

## 2020-01-19 RX ORDER — INSULIN LISPRO 100 [IU]/ML
10 INJECTION, SOLUTION INTRAVENOUS; SUBCUTANEOUS
Status: DISCONTINUED | OUTPATIENT
Start: 2020-01-19 | End: 2020-01-19

## 2020-01-19 RX ORDER — LOSARTAN POTASSIUM 50 MG/1
1 TABLET ORAL DAILY
COMMUNITY
Start: 2019-10-31 | End: 2022-01-10 | Stop reason: ALTCHOICE

## 2020-01-19 RX ORDER — LETROZOLE 2.5 MG/1
2.5 TABLET, FILM COATED ORAL EVERY EVENING
Status: DISCONTINUED | OUTPATIENT
Start: 2020-01-19 | End: 2020-01-22 | Stop reason: HOSPADM

## 2020-01-19 RX ORDER — INSULIN LISPRO 100 [IU]/ML
INJECTION, SOLUTION INTRAVENOUS; SUBCUTANEOUS
Status: DISCONTINUED | OUTPATIENT
Start: 2020-01-19 | End: 2020-01-19 | Stop reason: ALTCHOICE

## 2020-01-19 RX ADMIN — CEFTRIAXONE SODIUM 1 G: 1 INJECTION, POWDER, FOR SOLUTION INTRAMUSCULAR; INTRAVENOUS at 21:49

## 2020-01-19 RX ADMIN — CEFTRIAXONE SODIUM 1 G: 1 INJECTION, POWDER, FOR SOLUTION INTRAMUSCULAR; INTRAVENOUS at 12:41

## 2020-01-19 RX ADMIN — SODIUM CHLORIDE, SODIUM LACTATE, POTASSIUM CHLORIDE, AND CALCIUM CHLORIDE 125 ML/HR: 600; 310; 30; 20 INJECTION, SOLUTION INTRAVENOUS at 02:21

## 2020-01-19 RX ADMIN — FAMOTIDINE 20 MG: 10 INJECTION, SOLUTION INTRAVENOUS at 02:20

## 2020-01-19 RX ADMIN — Medication 10 ML: at 21:51

## 2020-01-19 RX ADMIN — ATORVASTATIN CALCIUM 40 MG: 40 TABLET, FILM COATED ORAL at 21:50

## 2020-01-19 RX ADMIN — SODIUM CHLORIDE, SODIUM LACTATE, POTASSIUM CHLORIDE, AND CALCIUM CHLORIDE 125 ML/HR: 600; 310; 30; 20 INJECTION, SOLUTION INTRAVENOUS at 19:59

## 2020-01-19 RX ADMIN — HEPARIN SODIUM 5000 UNITS: 5000 INJECTION INTRAVENOUS; SUBCUTANEOUS at 21:50

## 2020-01-19 NOTE — PROGRESS NOTES
Patient has been seen and evaluated in the emergency room   I have accepted the patient and placed essential orders.   Full H&P note to follow            \

## 2020-01-19 NOTE — H&P
History & Physical    Patient: Arcelia Marrero MRN: [id number]  [facility]: [id number]    YOB: 1955  Age: 75 y.o. Sex: female      DOA: 1/18/2020       HPI:     Arcelia Marrero is a 75 y.o. female with a past medical history of breast cancer 1 year ago with surgery followed by chemotherapy with chronic lymphedema right upper extremity wears a sleeve. She has been using Motrin 600 mg every 6 hours for lymphedema and arthritis pain does have Percocet as needed for severe pain from her oncologist.  Additionally she has diabetes mellitus type 2 and has been using an insulin pump and has CKD 3. Ms Batsheva Jc follows with Dr. Sonja Chacon, Urology, for over 4 years of recurrent struvite kidney stones and right staghorn, multiple procedures and stents, trial on Lithostat 250 mg BID in 2017 but unable to tolerate GI side effects. On 1/18/2020 the patient developed urinary frequency and bilateral flank pain with chills, fever, nausea and vomiting and presented by EMS to [facility] emergency room. In the emergency room the patient was febrile at 104.1, tachycardic 142 reg, /70, respirations were 22 and pulse ox 95%. WBC 12.5, hemoglobin 13.2, hematocrit 40.4, platelets 173, lactic acid elevated at 2.45, BMP glucose 2 8 BUN 33 with a creatinine of 1.85 GFR 33. Urine was turbid with moderate blood, large leukocyte esterase and WBCs too numerous to count. Sepsis bundle was initiated in the emergency room. CT of the abdomen pelvis demonstrated mild hydro-uretero-nephrosis bilaterally, no distinct obstructing process appreciated. Bilateral renal stones with staghorn type in the lower pole of the left kidney. ER discussed the case with Dr. Jacobo García, Urology,   who agreed with current plan, I will see the patient in the morning. Patient was admitted to telemetry. Due to numerous bed holds for unusually high inpatient census, patient remains in the ER room 18, pending bed assignment.     Past Medical History:   Diagnosis Date    Arthritis     Asthma 11/16/2018    Resolved, per patient    Breast CA ([facility] 02/28/2014    Right Breast  1-14-19  resolved with surgery    Controlled diabetes mellitus with chronic kidney disease ([facility] [address]     stage 3    GERD (gastroesophageal reflux disease) 11/16/2018    Resolved, per patient    Hiatus hernia syndrome     resolved, per pt.  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     HX GI      colonoscopy    HX GYN      urethra reconstruction x3    HX HYSTERECTOMY      HX MODIFIED RADICAL MASTECTOMY  3/21/2014    RIGHT MODIFIED RADICAL MASTECTOMY SENTINAL LYMPH NODE BIOPSY performed by Gladys Bingham MD at [facility] MAIN OR    HX TUBAL LIGATION      HX UROLOGICAL      4 diverticuli repaired    HX UROLOGICAL  03/29/2011    Cysto, percutaneous nephrolithotomy, antegrade URS, antegrade pyelogram.  Dr. Colleen Hensley, [facility].    HX UROLOGICAL  12/11/2009    Cysto, flexible URS, right RPG, lasertripsy, JJ stent placement. Dr. Colleen Hensley, [facility] [place].    HX UROLOGICAL  11/20/2009    Second look right attempted percutaneous nephrolithotomy. Dr. Colleen Hensley, [facility].    HX UROLOGICAL  10/23/2009    Cysto, right percutaneous nephrolithotomy. Dr. Colleen Hensley, [facility].    HX UROLOGICAL  08/07/2018    Cysto, bilat RPG, right URS, HLL, right JJ stent placement, right nephrostomy tube removal, Dr. Daija Zee, [facility]    HX UROLOGICAL  09/04/2018    cysto, right JJ stent removal, bilat RPG. Dr Daija Zee. [facility].  HX UROLOGICAL  11/20/2018    cysto, bilat RPG, left URS, laser lithotripsy, left JJ stent place. Dr. Jose D Gonzalez. [facility].  HX UROLOGICAL  01/25/2019    Left PCNL and removal of left JJ stent. Dr. Jose D Gonzalez at [facility].  HX VASCULAR ACCESS  2014    IR GUIDE DIL URET TRACT EXIST INCL NEW ACCESS W TUBE PLACMENT LT  1/25/2019       Family History   Problem Relation Age of Onset    Breast Cancer Sister 79    Diabetes Mother     Hypertension Mother     Breast Cancer Sister 39    Hypertension Other         Parent    Diabetes Other         parent    Stroke Other         parent    Hypertension Other         sibling    Diabetes Other         sibling    Osteoporosis Other         sibling    Breast Cancer Sister     Diabetes Sister     Diabetes Sister     Diabetes Sister      Social History     Socioeconomic History    Marital status: SINGLE     Spouse name: Not on file    Number of children: Not on file    Years of education: Not on file    Highest education level: Not on file   Occupational History    Occupation:      Employer: [employer]   Tobacco Use    Smoking status: Never Smoker    Smokeless tobacco: Never Used   Substance and Sexual Activity    Alcohol use: Never     Frequency: Never     Comment: occasionally    Drug use: No       Prior to Admission medications    Medication Sig Start Date End Date Taking? Authorizing Provider   cefdinir (OMNICEF) 300 mg capsule Take 1 Cap by mouth two (2) times a day. 12/11/19   Chato Duvall MD   atorvastatin (LIPITOR) 40 mg tablet  9/16/19   Provider, Shahida   ibuprofen (MOTRIN) 600 mg tablet Take 1 Tab by mouth every six (6) hours as needed for Pain. 10/11/19   Tasha Nj NP   letrozole [facility]) 2.5 mg tablet Take 1 Tab by mouth every evening. 10/11/19   Tasha Nj NP   potassium citrate (UROCIT-K) 15 mEq TbER tablet Take 1 Tab by mouth three (3) times daily (with meals).  5/7/19   Chato Duvall MD   PROLIA 60 mg/mL injection 60 mg every 6 months. 9/26/18   Provider, Historical   HUMALOG U-100 INSULIN 100 unit/mL injection 100 Units by SubCUTAneous route daily. Pt has insulin pump 8/4/18   Provider, Historical   losartan (COZAAR) 100 mg tablet Take 100 mg by mouth daily. Indications: takes in the evening 5/8/18   Provider, Historical   levothyroxine (SYNTHROID) 88 mcg tablet Take 88 mcg by mouth daily. 5/8/18   Provider, Historical   multivitamin (ONE A DAY) tablet Take 1 Tab by mouth daily. Provider, Historical       Allergies   Allergen Reactions    Ampicillin Hives     Review of systems  A 12 point review of systems was performed and unremarkable except as stated in the HPI, specifically the patient denied any confusion or altered mental status, headache, chest pain shortness of breath, or focal extremity pain. She did report nausea with several episodes of vomiting while having urgency and flank pain at home. Physical Exam:      Visit Vitals  /64   Pulse (!) 118   Temp 99.4 °F (37.4 °C)   Resp 21   Ht 4' 11\" (1.499 m)   Wt 108.9 kg (240 lb)   SpO2 95%   BMI 48.47 kg/m²       Physical Exam:  GENERAL: fatigued, cooperative, mild distress  HEENT head is atraumatic, conjunctiva clear, anicteric, facial symmetry, speech is clear and goal-directed, pharynx is clear, mucous membranes moist, no perioral lesions, neck is supple  Chest s/p right mastectomy, good airflow anteriorly, lungs are clear, heart rate is regular, tachy 118  Abdomen soft, BS + hyperactive, no abdominal tenderness, mild suprapubic tenderness, + CVA tenderness bilaterally. No ecchymosis or erythema over torso or back.   Extremities right upper extremity with +2 edema and compressive lymphedema sleeve, right hand with good cap refill, + pulses  Left upper extremity with full ROM, no edema, no leg edema,  Calves are soft, nontender, feet are warm, positive pulses    Lab/Data Review:  Labs: Results:       Chemistry Recent Labs     01/18/20  1800 *      K 4.2      CO2 27   BUN 33*   CREA 1.85*   CA 9.3   AGAP 5   BUCR 18   AP 87   TP 7.6   ALB 3.3*   GLOB 4.3*   AGRAT 0.8      CBC w/Diff Recent Labs     01/18/20  1800   WBC 12.5   RBC 5.13   HGB 13.2   HCT 40.4      GRANS 90*   LYMPH 6*   EOS 2      Coagulation No results for input(s): PTP, INR, APTT, INREXT in the last 72 hours. Iron/Ferritin No results for input(s): IRON in the last 72 hours. No lab exists for component: TIBCCALC   BNP No results for input(s): BNPP in the last 72 hours. Cardiac Enzymes No results for input(s): CPK, CKND1, ELICEO in the last 72 hours. No lab exists for component: CKRMB, TROIP   Liver Enzymes Recent Labs     01/18/20  1800   TP 7.6   ALB 3.3*   AP 87   SGOT 22      Thyroid Studies Lab Results   Component Value Date/Time    TSH 2.81 03/30/2014 07:13 AM          All Micro Results     Procedure Component Value Units Date/Time    CULTURE, URINE [[id number]] Collected:  01/18/20 2150    Order Status:  Completed Specimen:  Urine from Clean catch Updated:  01/19/20 0004    CULTURE, BLOOD [[id number]] Collected:  01/18/20 1910    Order Status:  Completed Specimen:  Blood Updated:  01/18/20 1944    CULTURE, BLOOD [[id number]] Collected:  01/18/20 1810    Order Status:  Completed Specimen:  Blood Updated:  01/18/20 1849          Imaging Reviewed:  CT Results (most recent):  Results from Hospital Encounter encounter on 01/18/20   CT ABD PELV WO CONT    Narrative EXAM: CT of the Abdomen and Pelvis without IV contrast    CLINICAL INDICATION:  hx of kidney stones, septic    TECHNIQUE: CT of the abdomen and pelvis.  Sagittal and coronal reformations    All CT scans at this facility are performed using dose optimization technique as  appropriate to a performed exam, to include automated exposure control,  adjustment of the mA and/or kV according to patient size (including appropriate  matching for site specific examination) or use of iterative reconstruction  technique. IV CONTRAST: None    ENTERIC CONTRAST: None    COMPARISON: CT dated 12/11/2014    FINDINGS:   Limitations: The evaluation of the solid organs is limited due to the lack of IV  contrast.    Lower Chest: Mild scarring in the right middle lobe is noted. This is unchanged  from prior CT. No effusion appreciated. No consolidation identified. The heart  and pericardium are unremarkable. Peritoneum: No free air appreciated. No free fluid present. No fluid collections  present. Liver: No focal lesion appreciated. Biliary/Gallbladder: No intrahepatic or extrahepatic biliary ductal dilatation  appreciated. The gallbladder is unremarkable. No pericholecystic fluid or  inflammation. Spleen: Unremarkable. Pancreas: No focal lesion appreciated. The pancreatic duct is unremarkable. No  peripancreatic inflammation or adenopathy. : The adrenal glands are unremarkable. Within the superior pole of the right  kidney, there is a 4 mm stone. In the interpolar region, there is a 6 cm stone. In the inferior pole the left kidney, there is a irregular likely staghorn  calculus measuring 1.6 x 0.7 cm. Slightly inferiorly, there were 3 small stones  2 of which are 3 mm and the other is 2 mm. Mild perinephric fat changes are  present bilaterally. There is lobulation of both kidneys with areas of likely  volume loss. There is minimal prominence of the collecting system bilaterally. This was likely present previously. There is mild prominence of the ureter  bilaterally without evidence of obstructing stone. The bladder is grossly  unremarkable. A few pelvic fluid ligaments are noted. The uterus is surgically  absent. GI: The stomach is unremarkable. The small bowel is without evidence of  obstruction. No small bowel wall thickening. The mesentery is without  inflammation or adenopathy. The appendix is unremarkable. Diverticula are  present within the descending and sigmoid colon.  No significant diverticulitis  appreciated. Aorta and retroperitoneum: No aortic aneurysm seen. . Nonspecific periaortic  lymph nodes are noted. The IVC is grossly unremarkable. No fluid collections in  the retroperitoneum appreciated. Abdominal wall/Inguinal: Unremarkable     Musculoskeletal: Mild multilevel degenerative disc disease and facet arthropathy  are noted. Mild degenerative changes of the hips are present. Impression IMPRESSION:   1. Mild hydroureteronephrosis bilaterally which may be acute or chronic. No  distinct obstructing process appreciated. Mild bilateral perinephric fat  changes. The possibility of infection should be considered. Correlation with  urinalysis and laboratory values is recommended. Bilateral renal stones with staghorn type in the lower pole of the left kidney. XR Results (most recent):  Results from Hospital Encounter encounter on 01/18/20   XR CHEST PORT    Narrative EXAM: CHEST ONE VIEW  portable 1749 hours    CLINICAL HISTORY/INDICATION: Sepsis    COMPARISON: Chest x-ray April 30, 2014. TECHNIQUE: One view obtained. FINDINGS:     The cardiac and mediastinal silhouette is normal. The lungs are clear. Pulmonary  vascularity is normal. The costophrenic angles are sharply defined. Mild disc  space narrowing with marginal spurring involves the mid and lower thoracic  spine. .      Impression IMPRESSION:    No acute finding.          Assessment:   Principal Problem:    Sepsis ([address] (1/18/2020)    Active Problems:    Personal history of malignant neoplasm of breast (3/1/2014)      Overview: Stage IIB (T3, N0, cM0)  invasive ductal carcinoma of the right breast,       surgery and palliative chemotherapy, oncologist is Dr. 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 (1/18/2020)      Controlled diabetes mellitus with chronic kidney disease ([facility] ()      Overview: stage 3      Staghorn renal calculus (1/18/2020)      Acute renal failure superimposed on stage 3 chronic kidney disease ([facility] (1/18/2020)        Plan:   Admit to telemetry when bed available  Dr Ton Elizabeth, has been consulted from the emergency room  Continue empiric antibiotics  Continue Femara  Patient would like to control her own insulin pump,            Discussed with pharmacy, nurses will perform point-of-care                  glucose, patient can administer insulin by pump  Continue Synthroid    Discussed with patient that ibuprofen is not a preferred drug with chronic kidney disease stage III, she stated she has used tramadol in the past with good relief, labeled chart adverse effects NSAIDs for CKD    Full code      Dl Das DO  1/18/2020, 23:56 PM

## 2020-01-19 NOTE — ED NOTES
Ms Cleve Rodrigues is a 76-year-old -American female with past medical history significant for UTI, type 2 diabetes, staghorn kidney stones, obstructive uropathy, and ureteral calculus who presents to the ED with a chief complaint of bilateral flank pain, onset 12 hours ago. Patient states she has been experiencing increased urinary frequency for the previous 48 hours. She states that over the past 24 hours she has developed fever and chills. Patient describes earlier today she developed bilateral flank pain, burning on urination, and nausea with 2 episodes of vomiting. Patient states the symptoms are similar to her previous episodes of infected kidney stones. Patient's EKG shows sinus tachycardia. Her lactic acid is elevated at 2.45. Remainder of work-up is pending. Patient to receive Rocephin antibiotics, 30 cc/kg bolus. Will obtain abdomen/pelvis CT and discuss case with nephrology. Sepsis bundle ordered. Care of patient signed out to Dr. Shelli Groves pending work-up. Urinalysis shows large leukocyte esterase. CT shows mild hydroureteronephrosis bilaterally. There is no appreciable obstructing process. Bilateral renal stones of staghorn appearance in lower pole of the left kidney. 7:09 PM care assumed after signout from Dr. Dilcia Garcia. 10:51 PM Discussed case with Dr Clay Romo, urology, who is in agreement to consultation. No further recommendations. He states he will see the patient in the morning. 11:20 PM Discussed case with Dr Billy Conrad who accepts admission to medicine. She requests cefepime Abx coverage which has been ordered. Patient re-evaluated. Resting comfortably, blood pressure stable.       Impression:  1) Sepsis  2) Hydroureteronephrosis  3) Staghorn Calculi  4) NURIA    Disposition: Admission to telemetry      Butch Lara MD

## 2020-01-19 NOTE — CONSULTS
ASSESSMENT:   Acute Febrile Urinary tract Infection with SIRS/impending sepsis  Recurrent Proteus UTI with recurrent Struvite stones and partial staghorn calculi  SP multiple percutaneous procedures(PCNL) and ureteroscopies especially over the last 10 years. PLAN:    There is no overt obstruction on CT scan. Agree with Parenteral antibiotics and already feels better this morning. Check bladder scans for PVR. She will need FU with my partner Dr. Winter Moyre to initiate discussion on stone clearance as this is paramount to keeping UTIs under control. Stone prevention and UTI prevention will have to be concomitant. Follow up arranged? NO       David Rodriguez MD    [phone]    January 19, 2020 3:58 PM        Chief Complaint   Patient presents with    Flank Pain    Nausea       HISTORY OF PRESENT ILLNESS:  Kp Waller is a 75 y.o. female who is seen in consultation as referred by Dr. Nathan Mendez  for Sepsis and bilateral renal calculi. .  The patient states she was normal earlier on the day prior to admission then suddenly developed shaking chills,fever and bladder discomfort. She denies flank pain on either side. She had some hypotension and tachycardia on admission to ED. She was also febrile. CT revealed bilateral renal calculi left side greater than Right. On the left there was a 1.6 cm cluster of stones in the lower pole. There was mild bilateral hydronephrosis on CT as well but no obstructing stone or transition point on CT was noted. This may be due to inflammation or chronic changes. Again she has no flank pain.       Past Medical History:   Diagnosis Date    Arthritis     Arthropathy, unspecified, site unspecified     Asthma 11/16/2018    Resolved, per patient    Breast CA ([facility] [address] 2/28/2014    Right Breast  1-14-19  resolved with surgery    Chemotherapy convalescence or palliative care     resolved    CKD (chronic kidney disease)     Diabetes mellitus     Family history of breast cancer     GERD (gastroesophageal reflux disease) 11/16/2018    Resolved, per patient    Hematuria     Hiatus hernia syndrome     resolved, per pt.  8-6-18    Hypertension     Insulin pump in place     Kidney stone     Lymphedema     right arm    Microhematuria     Morbid obesity ([address] 11/16/2018    BMI = 47.4    Nausea with vomiting     Postop N/V    Osteopenia     Radiation therapy complication     6-99-60 resolved, per patient    Recurrent UTI     Renal colic     S/P breast biopsy - right breast calcifications 10/18/10     Shortness of breath     With exertion. 8-6-18  pt denies     Staghorn calculus     Struvite kidney stones     Thyroid dysfunction     GOITER    Urethral diverticulum     Urine leukocytes     UTI (lower urinary tract infection)        Past Surgical History:   Procedure Laterality Date    HX BREAST BIOPSY  10/18/10    stereotactic - right breast calcifications    HX BREAST BIOPSY  2/14/2014    RIGHT BREAST BIOPSY performed by Creola Ormond, MD at [address] [place] [address] CYST REMOVAL  1997    Bilateral breast.     HX GI      colonoscopy    HX GYN      urethra reconstruction x3    HX HYSTERECTOMY      HX MODIFIED RADICAL MASTECTOMY  3/21/2014    RIGHT MODIFIED RADICAL MASTECTOMY SENTINAL LYMPH NODE BIOPSY performed by Creola Ormond, MD at [facility] MAIN OR    HX TUBAL LIGATION      HX UROLOGICAL      4 diverticuli repaired    HX UROLOGICAL  03/29/2011    Cysto, percutaneous nephrolithotomy, antegrade URS, antegrade pyelogram.  Dr. Demetria Pradhan, [facility].    HX UROLOGICAL  12/11/2009    Cysto, flexible URS, right RPG, lasertripsy, JJ stent placement. Dr. Demetria Pradhan, [facility] [place].    HX UROLOGICAL  11/20/2009    Second look right attempted percutaneous nephrolithotomy. Dr. Demetria Pradhan, [facility].    HX UROLOGICAL  10/23/2009    Cysto, right percutaneous nephrolithotomy.   Dr. Demetria Pradhan, [facility].    HX UROLOGICAL  08/07/2018    Cysto, bilat RPG, right URS, HLL, right JJ stent placement, right nephrostomy tube removal, Dr. Adriane Hdez, [facility]    HX UROLOGICAL  09/04/2018    cysto, right JJ stent removal, bilat RPG. Dr Adriane Foxr. [address]  HX UROLOGICAL  11/20/2018    cysto, bilat RPG, left URS, laser lithotripsy, left JJ stent place. Dr. Adriane Foxr. [address]  HX UROLOGICAL  01/25/2019    Left PCNL and removal of left JJ stent. Dr. Forrest Stager at [address]     HX VASCULAR ACCESS  2014    IR GUIDE DIL URET TRACT EXIST INCL NEW ACCESS W TUBE PLACMENT LT  1/25/2019       Social History     Tobacco Use    Smoking status: Never Smoker    Smokeless tobacco: Never Used   Substance Use Topics    Alcohol use: Yes     Frequency: Never     Comment: occasionally    Drug use: No       Allergies   Allergen Reactions    Ampicillin Hives       Family History   Problem Relation Age of Onset    Breast Cancer Sister 79    Diabetes Mother     Hypertension Mother     Breast Cancer Sister 39    Hypertension Other         Parent    Diabetes Other         parent    Stroke Other         parent    Hypertension Other         sibling    Diabetes Other         sibling    Osteoporosis Other         sibling    Breast Cancer Sister     Diabetes Sister     Diabetes Sister     Diabetes Sister        Current Facility-Administered Medications   Medication Dose Route Frequency Provider Last Rate Last Dose    sodium chloride (NS) flush 5-40 mL  5-40 mL IntraVENous Q8H Fco Gar A, DO        sodium chloride (NS) flush 5-40 mL  5-40 mL IntraVENous PRN Fco MENESES, DO        lactated Ringers infusion  125 mL/hr IntraVENous CONTINUOUS Fco MENESES  mL/hr at 01/19/20 0221 125 mL/hr at 01/19/20 0221    acetaminophen (TYLENOL) tablet 650 mg  650 mg Oral Q4H PRN Bekah De La Cruz,         heparin (porcine) injection 5,000 Units  5,000 Units SubCUTAneous Q8H Fco MENESES, DO        cefTRIAXone (ROCEPHIN) 1 g in sterile water (preservative free) 10 mL IV syringe  1 g IntraVENous Q12H Fco Gar A, DO   1 g at 01/19/20 1241    traMADol (ULTRAM) tablet 100 mg  100 mg Oral Q6H PRN Dagoberto Coad, DO        [START ON 1/20/2020] famotidine (PF) (PEPCID) 20 mg in 0.9% sodium chloride 10 mL injection  20 mg IntraVENous DAILY Alla Osgood A, DO        insulin lispro (HUMALOG) injection 10 Units  10 Units Other TIDAC Elodia Quiñones MD        sodium chloride (NS) flush 5-10 mL  5-10 mL IntraVENous PRN Alvin Mccullough MD         Current Outpatient Medications   Medication Sig Dispense Refill    cefdinir (OMNICEF) 300 mg capsule Take 1 Cap by mouth two (2) times a day. 14 Cap 0    atorvastatin (LIPITOR) 40 mg tablet       ibuprofen (MOTRIN) 600 mg tablet Take 1 Tab by mouth every six (6) hours as needed for Pain. 120 Tab 0    letrozole (FEMARA) 2.5 mg tablet Take 1 Tab by mouth every evening. 90 Tab 3    potassium citrate (UROCIT-K) 15 mEq TbER tablet Take 1 Tab by mouth three (3) times daily (with meals). 180 Tab 6    PROLIA 60 mg/mL injection 60 mg every 6 months.  HUMALOG U-100 INSULIN 100 unit/mL injection 100 Units by SubCUTAneous route daily. Pt has insulin pump      losartan (COZAAR) 100 mg tablet Take 50 mg by mouth daily. Indications: high blood pressure, takes in the evening      levothyroxine (SYNTHROID) 88 mcg tablet Take 88 mcg by mouth daily.  multivitamin (ONE A DAY) tablet Take 1 Tab by mouth daily. Review of Systems:  Constitutional: Positive for chills and fever. Negative for diaphoresis and fatigue. HENT: Negative for congestion and rhinorrhea. Eyes: Negative for pain and redness. Respiratory: Negative for cough, chest tightness, shortness of breath and wheezing. Cardiovascular: Negative for chest pain, palpitations and leg swelling. Gastrointestinal: Positive for abdominal pain, nausea and vomiting. Negative for blood in stool and diarrhea. Genitourinary: Positive for dysuria, flank pain (Bilateral), frequency and urgency. Negative for hematuria. Musculoskeletal: Negative for myalgias. Skin: Negative for pallor and rash. Neurological: Negative for dizziness, light-headedness and headaches. Psychiatric/Behavioral: Negative for confusion. PHYSICAL EXAMINATION:     Visit Vitals  /67   Pulse 92   Temp 99.4 °F (37.4 °C)   Resp 19   Ht 4' 11\" (1.499 m)   Wt 240 lb (108.9 kg)   SpO2 96%   Breastfeeding No   BMI 48.47 kg/m²     Constitutional: Well developed, well-nourished female in no acute distress. HEENT: Normocephalic, Atraumatic   CV:   no edema   Respiratory: No respiratory distress or difficulties breathing   Abdomen:  Soft and nontender.  Female:  CVA tenderness: none  Skin:  Normal color. No evidence of jaundice. Neuro/Psych:  Patient with appropriate affect. Alert and oriented.         REVIEW OF LABS AND IMAGING:         Labs: Results:   Chemistry    Recent Labs     01/19/20 0520 01/18/20  1800   * 218*    143   K 4.1 4.2   * 111   CO2 23 27   BUN 27* 33*   CREA 1.65* 1.85*   CA 8.1* 9.3   AGAP 5 5   BUCR 16 18   AP  --  87   TP  --  7.6   ALB  --  3.3*   GLOB  --  4.3*   AGRAT  --  0.8      CBC w/Diff Recent Labs     01/19/20 0520 01/18/20  1800   WBC 16.3* 12.5   RBC 4.22 5.13   HGB 10.9* 13.2   HCT 33.1* 40.4    269   GRANS 80* 90*   LYMPH 14* 6*   EOS 0 2      Cultures Recent Labs     01/18/20 1910 01/18/20 1810   CULT NO GROWTH AFTER 10 HOURS NO GROWTH AFTER 11 HOURS     All Micro Results     Procedure Component Value Units Date/Time    CULTURE, BLOOD [[id number]] Collected:  01/18/20 1910    Order Status:  Completed Specimen:  Blood Updated:  01/19/20 0611     Special Requests: NO SPECIAL REQUESTS        Culture result: NO GROWTH AFTER 10 HOURS       CULTURE, BLOOD [[id number]] Collected:  01/18/20 1810    Order Status:  Completed Specimen:  Blood Updated:  01/19/20 0611     Special Requests: NO SPECIAL REQUESTS        Culture result: NO GROWTH AFTER 11 HOURS       CULTURE, URINE [[id number]] Collected:  01/18/20 2150    Order Status: Completed Specimen:  Urine from Clean catch Updated:  01/19/20 0004            Urinalysis Color   Date Value Ref Range Status   01/18/2020 YELLOW   Final     Appearance   Date Value Ref Range Status   01/18/2020 TURBID   Final     Specific gravity   Date Value Ref Range Status   01/18/2020 1.012 1.005 - 1.030   Final     pH (UA)   Date Value Ref Range Status   01/18/2020 5.0 5.0 - 8.0   Final     Protein   Date Value Ref Range Status   01/18/2020 100 (A) NEG mg/dL Final     Ketone   Date Value Ref Range Status   01/18/2020 TRACE (A) NEG mg/dL Final     Bilirubin   Date Value Ref Range Status   01/18/2020 NEGATIVE  NEG   Final     Blood   Date Value Ref Range Status   01/18/2020 MODERATE (A) NEG   Final     Urobilinogen   Date Value Ref Range Status   01/18/2020 0.2 0.2 - 1.0 EU/dL Final     Nitrites   Date Value Ref Range Status   01/18/2020 NEGATIVE  NEG   Final     Leukocyte Esterase   Date Value Ref Range Status   01/18/2020 LARGE (A) NEG   Final     Potassium   Date Value Ref Range Status   01/19/2020 4.1 3.5 - 5.5 mmol/L Final     Creatinine   Date Value Ref Range Status   01/19/2020 1.65 (H) 0.6 - 1.3 MG/DL Final     BUN   Date Value Ref Range Status   01/19/2020 27 (H) 7.0 - 18 MG/DL Final      Coagulation Lab Results   Component Value Date/Time    Prothrombin time 14.1 (H) 01/25/2019 10:12 AM    Prothrombin time 12.1 04/28/2014 04:49 PM    INR 1.2 (H) 01/25/2019 10:12 AM    INR 1.0 04/28/2014 04:49 PM    aPTT 25.6 04/28/2014 04:49 PM

## 2020-01-20 LAB
GLUCOSE BLD STRIP.AUTO-MCNC: 139 MG/DL (ref 70–110)
GLUCOSE BLD STRIP.AUTO-MCNC: 170 MG/DL (ref 70–110)
GLUCOSE BLD STRIP.AUTO-MCNC: 203 MG/DL (ref 70–110)
GLUCOSE BLD STRIP.AUTO-MCNC: 222 MG/DL (ref 70–110)

## 2020-01-20 PROCEDURE — 74011250637 HC RX REV CODE- 250/637: Performed by: HOSPITALIST

## 2020-01-20 PROCEDURE — 74011000250 HC RX REV CODE- 250: Performed by: HOSPITALIST

## 2020-01-20 PROCEDURE — 74011250636 HC RX REV CODE- 250/636: Performed by: HOSPITALIST

## 2020-01-20 PROCEDURE — 82962 GLUCOSE BLOOD TEST: CPT

## 2020-01-20 PROCEDURE — 65660000000 HC RM CCU STEPDOWN

## 2020-01-20 RX ORDER — MAGNESIUM SULFATE 100 %
4 CRYSTALS MISCELLANEOUS AS NEEDED
Status: DISCONTINUED | OUTPATIENT
Start: 2020-01-20 | End: 2020-01-22 | Stop reason: HOSPADM

## 2020-01-20 RX ORDER — DEXTROSE MONOHYDRATE 100 MG/ML
125-250 INJECTION, SOLUTION INTRAVENOUS AS NEEDED
Status: DISCONTINUED | OUTPATIENT
Start: 2020-01-20 | End: 2020-01-22 | Stop reason: HOSPADM

## 2020-01-20 RX ORDER — DEXTROSE 50 % IN WATER (D50W) INTRAVENOUS SYRINGE
25-50 AS NEEDED
Status: DISCONTINUED | OUTPATIENT
Start: 2020-01-20 | End: 2020-01-20

## 2020-01-20 RX ADMIN — Medication 10 ML: at 17:01

## 2020-01-20 RX ADMIN — SODIUM CHLORIDE, SODIUM LACTATE, POTASSIUM CHLORIDE, AND CALCIUM CHLORIDE 125 ML/HR: 600; 310; 30; 20 INJECTION, SOLUTION INTRAVENOUS at 15:16

## 2020-01-20 RX ADMIN — CEFTRIAXONE SODIUM 1 G: 1 INJECTION, POWDER, FOR SOLUTION INTRAMUSCULAR; INTRAVENOUS at 21:41

## 2020-01-20 RX ADMIN — LEVOTHYROXINE SODIUM 88 MCG: 100 TABLET ORAL at 06:44

## 2020-01-20 RX ADMIN — CEFTRIAXONE SODIUM 1 G: 1 INJECTION, POWDER, FOR SOLUTION INTRAMUSCULAR; INTRAVENOUS at 09:08

## 2020-01-20 RX ADMIN — INSULIN LISPRO 1.4 UNITS: 100 INJECTION, SOLUTION INTRAVENOUS; SUBCUTANEOUS at 07:30

## 2020-01-20 RX ADMIN — HEPARIN SODIUM 5000 UNITS: 5000 INJECTION INTRAVENOUS; SUBCUTANEOUS at 06:45

## 2020-01-20 RX ADMIN — HEPARIN SODIUM 5000 UNITS: 5000 INJECTION INTRAVENOUS; SUBCUTANEOUS at 21:41

## 2020-01-20 RX ADMIN — LETROZOLE 2.5 MG: 2.5 TABLET ORAL at 17:01

## 2020-01-20 RX ADMIN — HEPARIN SODIUM 5000 UNITS: 5000 INJECTION INTRAVENOUS; SUBCUTANEOUS at 15:09

## 2020-01-20 RX ADMIN — SODIUM CHLORIDE, SODIUM LACTATE, POTASSIUM CHLORIDE, AND CALCIUM CHLORIDE 125 ML/HR: 600; 310; 30; 20 INJECTION, SOLUTION INTRAVENOUS at 04:30

## 2020-01-20 RX ADMIN — ATORVASTATIN CALCIUM 40 MG: 40 TABLET, FILM COATED ORAL at 21:42

## 2020-01-20 RX ADMIN — INSULIN LISPRO 0.3 UNITS: 100 INJECTION, SOLUTION INTRAVENOUS; SUBCUTANEOUS at 11:30

## 2020-01-20 RX ADMIN — Medication 10 ML: at 21:42

## 2020-01-20 RX ADMIN — THERA TABS 1 TABLET: TAB at 09:07

## 2020-01-20 RX ADMIN — FAMOTIDINE 20 MG: 10 INJECTION, SOLUTION INTRAVENOUS at 09:07

## 2020-01-20 RX ADMIN — Medication 10 ML: at 06:45

## 2020-01-20 RX ADMIN — LOSARTAN POTASSIUM 50 MG: 50 TABLET, FILM COATED ORAL at 09:07

## 2020-01-20 NOTE — ROUTINE PROCESS
1920 Bedside and Verbal shift change  Received from Ana Luisa Murdock RN (outgoing nurse), to PERLA Garcia (oncoming)  Pt. Is AOX 4. IV patent and infusing well, Pt. denies  pain at this time. Report included the following information SBAR, Kardex, Procedure Summary, Intake/Output, MAR, Recent Lab Results, and  Cardiac Rhythm @ NSR. Will resume care and monitor Pt. Condition. Pt. Educated on call bell when in need of help and assistance. Pt. verbalized understanding. 2000  Pt. Head to toe Assessment Done and documented. 2100  Pt. Resting comfortably in bed, no sign of distress. 2200  Pt. Made no complaints. 2300  Pt. Denies pain. 0000  Pt. Resting in bed comfortably, no sign of discomfort. 0200  Pt made no comlaints. 0400  Pt. Able to rest and sleep well throughout the shift.    0600  Pt. Not in distress, denies pain. Verbal and bedside Shift changed report given to Milly Oviedo (oncoming RN) on Pt. Condition. Report consisted of patients Situation, History, Activities, intake/output,  Background, Assessment and Recommendations(SBAR). Information from the following report(s) Kardex, order Summary, Lab results and MAR was reviewed with the receiving nurse. Opportunity for questions and clarification was provided.

## 2020-01-20 NOTE — PROGRESS NOTES
conducted an initial consultation and Spiritual Assessment for [facility] DENA AWAD, who is a 75 y.o.,female. Patients Primary Language is: [place]. According to the patients EMR Rastafari Affiliation is: [facility].     The reason the Patient came to the hospital is:   Patient Active Problem List    Diagnosis Date Noted    Insomnia 06/09/2015     Priority: 1 - One    Arthritis 06/09/2015     Priority: 1 - One    Lymphedema of upper extremity following lymphadenectomy 03/06/2015     Priority: 1 - One    Skin infection 12/05/2014     Priority: 1 - One    Insulin pump status 01/19/2020    Sepsis ([address] 01/18/2020    Staghorn kidney stones 01/25/2019    Kidney stones 01/25/2019    Type 2 diabetes with nephropathy ([address] 09/20/2018    Malfunction of nephrostomy tube ([address] 08/03/2018    Obstructive uropathy 07/10/2018    Pyelonephritis 07/10/2018    Ureteral calculus, right 07/10/2018    Morbid obesity ([address] 02/05/2018    Uncontrolled type 2 diabetes mellitus, with long-term current use of insulin ([address] 02/05/2018    Muscle spasm 08/18/2017    Malignant neoplasm of upper-outer quadrant of right female breast ([address] 05/12/2017    Personal history of malignant neoplasm of breast 03/17/2016    Breast CA ([address] 02/28/2014    DM (diabetes mellitus) ([address] 10/15/2013    Family history of breast cancer     S/P breast biopsy - right breast calcifications 10/18/10     Microscopic hematuria 23/26/5492    Renal colic 27/67/1651    Staghorn calculus 08/12/2009    Urinary tract infection, site not specified 08/12/2009        The  provided the following Interventions:  Initiated a relationship of care and support. Explored issues of farzana, belief, spirituality and Jew/ritual needs while hospitalized. Listened empathically. Provided chaplaincy education. Provided information about Spiritual Care Services. Chart reviewed.     The following outcomes where achieved:  Patient shared limited information about both their medical narrative and spiritual journey/beliefs.  confirmed Patient's Denominational Affiliation. Patient processed feeling about current hospitalization. Patient expressed gratitude for 's visit. Assessment:  Patient does not have any Alevism/cultural needs that will affect patients preferences in health care. There are no spiritual or Alevism issues which require intervention at this time. Plan:  Chaplains will continue to follow and will provide pastoral care on an as needed/requested basis.  recommends bedside caregivers page  on duty if patient shows signs of acute spiritual or emotional distress.     [address]   [phone]

## 2020-01-20 NOTE — PROGRESS NOTES
[facility] Hospitalist Group  Progress Note    Patient: Precious Brink Age: 75 y.o. : 1955 MR#: [id number] SSN: [id number]  Date: 2020      Subjective/24-hour events:     Late entry note due to error in communication re: H&P dating. Patient seen by me  AM.  Patient remains roomed in ED 17 and seen in ED 17.  She reports feeling significantly better relative to presentation. Continues to have some flank pain but this is significantly improved - states, \"I think the antibiotics are working. \"  No abdominal pain or N/V reported and no fever since arrival.    Assessment:   UTI  CKD 3  DM 2  HTN  Hypothyroidism  Morbid obesity  Hx renal stones requiring intervention  Hx breast cancer    Plan:  Continue antibiotic therapy as ordered and follow cultures. Adjust antibiotic therapy as necessary. Urology evaluation pending - recommendations appreciated in advance. Discussed insulin orders with admitting hospitalist (Dr. Sam Thomas) and pharmacy. Dr. Sam Thomas has placed additional orders. Will allow patient to use insulin pump per home regimen per her request.  Patient has stated that basal bolus insulin that she usually gets when admitted to hospital does not control her sugars well enough. Received call from nursing following visit regarding diet, as one had not been ordered. Verbal order given to allow patient to eat as no procedures are likely at this point today. Continue current management as ordered otherwise. Dialogue had with patient prior to leaving room regarding hospital bedflow situation and stated that I was unable to tell when she would be able to be moved to the floor but apologized for her inconvenience.     Case discussed with:  [x]Patient  []Family  [x]Nursing  []Case Management  DVT Prophylaxis:  [x]Lovenox  []Hep SQ  []SCDs  []Coumadin   []On Heparin gtt    Objective:   VS:   Visit Vitals  /90   Pulse 89   Temp 98.6 °F (37 °C)   Resp 15   Ht 4' 11\" (1.499 m)   Wt 108.9 kg (240 lb)   SpO2 96%   Breastfeeding No   BMI 48.47 kg/m²        General:  In NAD. Nontoxic-appearing. Cardiovascular:  RRR. Pulmonary:  No wheezing, effort nonlabored. GI:  Abdomen soft, NTTP. Extremities:  Warm, no ischemia. Neuro:  Awake and alert, motor nonfocal.    Labs:     Ref. Range 1/19/2020 05:20   Sodium Latest Ref Range: 136 - 145 mmol/L 140   Potassium Latest Ref Range: 3.5 - 5.5 mmol/L 4.1   Chloride Latest Ref Range: 100 - 111 mmol/L 112 (H)   CO2 Latest Ref Range: 21 - 32 mmol/L 23   Anion gap Latest Ref Range: 3.0 - 18 mmol/L 5   Glucose Latest Ref Range: 74 - 99 mg/dL 323 (H)   BUN Latest Ref Range: 7.0 - 18 MG/DL 27 (H)   Creatinine Latest Ref Range: 0.6 - 1.3 MG/DL 1.65 (H)   BUN/Creatinine ratio Latest Ref Range: 12 - 20   16   Calcium Latest Ref Range: 8.5 - 10.1 MG/DL 8.1 (L)   GFR est non-AA Latest Ref Range: >60 ml/min/1.73m2 31 (L)   GFR est AA Latest Ref Range: >60 ml/min/1.73m2 38 (L)          Ref.  Range 1/19/2020 05:20   WBC Latest Ref Range: 4.6 - 13.2 K/uL 16.3 (H)   RBC Latest Ref Range: 4.20 - 5.30 M/uL 4.22   HGB Latest Ref Range: 12.0 - 16.0 g/dL 10.9 (L)   HCT Latest Ref Range: 35.0 - 45.0 % 33.1 (L)   MCV Latest Ref Range: 74.0 - 97.0 FL 78.4   MCH Latest Ref Range: 24.0 - 34.0 PG 25.8   MCHC Latest Ref Range: 31.0 - 37.0 g/dL 32.9   RDW Latest Ref Range: 11.6 - 14.5 % 14.3   PLATELET Latest Ref Range: 135 - 420 K/uL 226   MPV Latest Ref Range: 9.2 - 11.8 FL 9.6   NEUTROPHILS Latest Ref Range: 40 - 73 % 80 (H)       Signed By: Vera Lewis MD     January 20, 2020

## 2020-01-20 NOTE — CDMP QUERY
Patient admitted with UTI  noted to have fever,elevated wbc's . If possible, please document in progress notes and d/c summary if you are evaluating and/or treating any of the following: 
 
 
? Sepsis POA associated with UTI ? Other Explanation of clinical findings ? Clinically Undetermined (no explanation for clinical findings) The medical record reflects the following: 
 
   Risk Factors: 64yof dx uti Clinical Indicators: tachycardia,fever, lact acid elevated,elevated wbc's,ua w/ large l.esterase, 
   Treatment: w/ IV abx therapy Thank you    Nadeem HALEY    ext [phone]

## 2020-01-20 NOTE — PROGRESS NOTES
Problem: Patient Education: Go to Patient Education Activity  Goal: Patient/Family Education  Outcome: Progressing Towards Goal     Problem: Sepsis: Day of Diagnosis  Goal: Off Pathway (Use only if patient is Off Pathway)  Outcome: Progressing Towards Goal  Goal: *Fluid resuscitation  Outcome: Progressing Towards Goal  Goal: *Paired blood cultures prior to first dose of antibiotic  Outcome: Progressing Towards Goal  Goal: *First dose of  appropriate antibiotic within 3 hours of arrival to ED, within 1 hour of arrival to ICU  Outcome: Progressing Towards Goal  Goal: *Lactic acid with first set of blood cultures  Outcome: Progressing Towards Goal  Goal: *Pneumococcal immunization (if eligible)  Outcome: Progressing Towards Goal  Goal: *Influenza immunization (if eligible)  Outcome: Progressing Towards Goal  Goal: Activity/Safety  Outcome: Progressing Towards Goal  Goal: Consults, if ordered  Outcome: Progressing Towards Goal  Goal: Diagnostic Test/Procedures  Outcome: Progressing Towards Goal  Goal: Nutrition/Diet  Outcome: Progressing Towards Goal  Goal: Discharge Planning  Outcome: Progressing Towards Goal  Goal: Medications  Outcome: Progressing Towards Goal  Goal: Respiratory  Outcome: Progressing Towards Goal  Goal: Treatments/Interventions/Procedures  Outcome: Progressing Towards Goal  Goal: Psychosocial  Outcome: Progressing Towards Goal     Problem: Sepsis: Day of Diagnosis  Goal: Nutrition/Diet  Outcome: Progressing Towards Goal     Problem: Sepsis: Day 2  Goal: Activity/Safety  Outcome: Progressing Towards Goal     Problem: Sepsis: Day 2  Goal: Nutrition/Diet  Outcome: Progressing Towards Goal     Problem: Sepsis: Day 2  Goal: Medications  Outcome: Progressing Towards Goal

## 2020-01-20 NOTE — ROUTINE PROCESS
POST VOID RESIDUAL (BLADDER SCAN)    Jan 20  0140       39ml    Jan 20  0430        27ml    Jan 20 0700         15 ml

## 2020-01-21 LAB
ANION GAP SERPL CALC-SCNC: 5 MMOL/L (ref 3–18)
BASOPHILS # BLD: 0 K/UL (ref 0–0.1)
BASOPHILS NFR BLD: 0 % (ref 0–2)
BUN SERPL-MCNC: 19 MG/DL (ref 7–18)
BUN/CREAT SERPL: 14 (ref 12–20)
CALCIUM SERPL-MCNC: 9.1 MG/DL (ref 8.5–10.1)
CHLORIDE SERPL-SCNC: 112 MMOL/L (ref 100–111)
CO2 SERPL-SCNC: 25 MMOL/L (ref 21–32)
CREAT SERPL-MCNC: 1.32 MG/DL (ref 0.6–1.3)
DIFFERENTIAL METHOD BLD: NORMAL
EOSINOPHIL # BLD: 0.3 K/UL (ref 0–0.4)
EOSINOPHIL NFR BLD: 3 % (ref 0–5)
ERYTHROCYTE [DISTWIDTH] IN BLOOD BY AUTOMATED COUNT: 14 % (ref 11.6–14.5)
FERRITIN SERPL-MCNC: 84 NG/ML (ref 8–388)
FOLATE SERPL-MCNC: 14.1 NG/ML (ref 3.1–17.5)
GLUCOSE BLD STRIP.AUTO-MCNC: 178 MG/DL (ref 70–110)
GLUCOSE BLD STRIP.AUTO-MCNC: 190 MG/DL (ref 70–110)
GLUCOSE BLD STRIP.AUTO-MCNC: 193 MG/DL (ref 70–110)
GLUCOSE BLD STRIP.AUTO-MCNC: 215 MG/DL (ref 70–110)
GLUCOSE SERPL-MCNC: 180 MG/DL (ref 74–99)
HCT VFR BLD AUTO: 36.8 % (ref 35–45)
HGB BLD-MCNC: 12 G/DL (ref 12–16)
IRON SATN MFR SERPL: 16 %
IRON SERPL-MCNC: 40 UG/DL (ref 50–175)
LYMPHOCYTES # BLD: 3 K/UL (ref 0.9–3.6)
LYMPHOCYTES NFR BLD: 32 % (ref 21–52)
MAGNESIUM SERPL-MCNC: 2 MG/DL (ref 1.6–2.6)
MCH RBC QN AUTO: 26 PG (ref 24–34)
MCHC RBC AUTO-ENTMCNC: 32.6 G/DL (ref 31–37)
MCV RBC AUTO: 79.8 FL (ref 74–97)
MONOCYTES # BLD: 0.6 K/UL (ref 0.05–1.2)
MONOCYTES NFR BLD: 6 % (ref 3–10)
NEUTS SEG # BLD: 5.6 K/UL (ref 1.8–8)
NEUTS SEG NFR BLD: 59 % (ref 40–73)
PHOSPHATE SERPL-MCNC: 2.9 MG/DL (ref 2.5–4.9)
PLATELET # BLD AUTO: 253 K/UL (ref 135–420)
PMV BLD AUTO: 9.7 FL (ref 9.2–11.8)
POTASSIUM SERPL-SCNC: 4 MMOL/L (ref 3.5–5.5)
RBC # BLD AUTO: 4.61 M/UL (ref 4.2–5.3)
SODIUM SERPL-SCNC: 142 MMOL/L (ref 136–145)
TIBC SERPL-MCNC: 247 UG/DL (ref 250–450)
VIT B12 SERPL-MCNC: 487 PG/ML (ref 211–911)
WBC # BLD AUTO: 9.6 K/UL (ref 4.6–13.2)

## 2020-01-21 PROCEDURE — 74011250637 HC RX REV CODE- 250/637: Performed by: HOSPITALIST

## 2020-01-21 PROCEDURE — 82962 GLUCOSE BLOOD TEST: CPT

## 2020-01-21 PROCEDURE — 97116 GAIT TRAINING THERAPY: CPT

## 2020-01-21 PROCEDURE — 85025 COMPLETE CBC W/AUTO DIFF WBC: CPT

## 2020-01-21 PROCEDURE — 77030038269 HC DRN EXT URIN PURWCK BARD -A

## 2020-01-21 PROCEDURE — 74011250636 HC RX REV CODE- 250/636: Performed by: HOSPITALIST

## 2020-01-21 PROCEDURE — 83735 ASSAY OF MAGNESIUM: CPT

## 2020-01-21 PROCEDURE — 83540 ASSAY OF IRON: CPT

## 2020-01-21 PROCEDURE — 82728 ASSAY OF FERRITIN: CPT

## 2020-01-21 PROCEDURE — 74011250637 HC RX REV CODE- 250/637: Performed by: INTERNAL MEDICINE

## 2020-01-21 PROCEDURE — 82746 ASSAY OF FOLIC ACID SERUM: CPT

## 2020-01-21 PROCEDURE — 97165 OT EVAL LOW COMPLEX 30 MIN: CPT

## 2020-01-21 PROCEDURE — 74011000250 HC RX REV CODE- 250: Performed by: HOSPITALIST

## 2020-01-21 PROCEDURE — 80048 BASIC METABOLIC PNL TOTAL CA: CPT

## 2020-01-21 PROCEDURE — 84100 ASSAY OF PHOSPHORUS: CPT

## 2020-01-21 PROCEDURE — 65660000000 HC RM CCU STEPDOWN

## 2020-01-21 PROCEDURE — 36415 COLL VENOUS BLD VENIPUNCTURE: CPT

## 2020-01-21 PROCEDURE — 97161 PT EVAL LOW COMPLEX 20 MIN: CPT

## 2020-01-21 RX ORDER — FAMOTIDINE 20 MG/1
20 TABLET, FILM COATED ORAL DAILY
Status: DISCONTINUED | OUTPATIENT
Start: 2020-01-22 | End: 2020-01-22 | Stop reason: HOSPADM

## 2020-01-21 RX ORDER — INSULIN LISPRO 100 [IU]/ML
200 INJECTION, SOLUTION INTRAVENOUS; SUBCUTANEOUS ONCE
Status: DISPENSED | OUTPATIENT
Start: 2020-01-21 | End: 2020-01-22

## 2020-01-21 RX ORDER — LANOLIN ALCOHOL/MO/W.PET/CERES
1 CREAM (GRAM) TOPICAL
Status: DISCONTINUED | OUTPATIENT
Start: 2020-01-21 | End: 2020-01-22 | Stop reason: HOSPADM

## 2020-01-21 RX ADMIN — Medication 10 ML: at 06:33

## 2020-01-21 RX ADMIN — THERA TABS 1 TABLET: TAB at 08:48

## 2020-01-21 RX ADMIN — CEFTRIAXONE SODIUM 1 G: 1 INJECTION, POWDER, FOR SOLUTION INTRAMUSCULAR; INTRAVENOUS at 21:13

## 2020-01-21 RX ADMIN — TRAMADOL HYDROCHLORIDE 100 MG: 50 TABLET, FILM COATED ORAL at 13:11

## 2020-01-21 RX ADMIN — FAMOTIDINE 20 MG: 10 INJECTION, SOLUTION INTRAVENOUS at 08:48

## 2020-01-21 RX ADMIN — CEFTRIAXONE SODIUM 1 G: 1 INJECTION, POWDER, FOR SOLUTION INTRAMUSCULAR; INTRAVENOUS at 08:49

## 2020-01-21 RX ADMIN — LEVOTHYROXINE SODIUM 88 MCG: 100 TABLET ORAL at 06:33

## 2020-01-21 RX ADMIN — ATORVASTATIN CALCIUM 40 MG: 40 TABLET, FILM COATED ORAL at 21:13

## 2020-01-21 RX ADMIN — HEPARIN SODIUM 5000 UNITS: 5000 INJECTION INTRAVENOUS; SUBCUTANEOUS at 06:33

## 2020-01-21 RX ADMIN — FERROUS SULFATE TAB 325 MG (65 MG ELEMENTAL FE) 325 MG: 325 (65 FE) TAB at 18:08

## 2020-01-21 RX ADMIN — Medication 10 ML: at 21:24

## 2020-01-21 RX ADMIN — LOSARTAN POTASSIUM 50 MG: 50 TABLET, FILM COATED ORAL at 08:49

## 2020-01-21 RX ADMIN — SODIUM CHLORIDE, SODIUM LACTATE, POTASSIUM CHLORIDE, AND CALCIUM CHLORIDE 125 ML/HR: 600; 310; 30; 20 INJECTION, SOLUTION INTRAVENOUS at 21:12

## 2020-01-21 RX ADMIN — TRAMADOL HYDROCHLORIDE 100 MG: 50 TABLET, FILM COATED ORAL at 19:55

## 2020-01-21 RX ADMIN — LETROZOLE 2.5 MG: 2.5 TABLET ORAL at 17:42

## 2020-01-21 RX ADMIN — SODIUM CHLORIDE, SODIUM LACTATE, POTASSIUM CHLORIDE, AND CALCIUM CHLORIDE 125 ML/HR: 600; 310; 30; 20 INJECTION, SOLUTION INTRAVENOUS at 09:02

## 2020-01-21 RX ADMIN — HEPARIN SODIUM 5000 UNITS: 5000 INJECTION INTRAVENOUS; SUBCUTANEOUS at 21:38

## 2020-01-21 RX ADMIN — SODIUM CHLORIDE, SODIUM LACTATE, POTASSIUM CHLORIDE, AND CALCIUM CHLORIDE 125 ML/HR: 600; 310; 30; 20 INJECTION, SOLUTION INTRAVENOUS at 00:13

## 2020-01-21 NOTE — DIABETES MGMT
Diabetes Patient/Family Education Record  Factors That  May Influence Patients Ability  to Learn or  Comply with Recommendations   []   Language barrier    []   Cultural needs   []   Motivation    []   Cognitive limitation    []   Physical   []   Education    []   Physiological factors   []   Hearing/vision/speaking impairment   []   Mandaen beliefs    []   Financial factors   []  Other:   [x]  No factors identified at this time. Person Instructed:   [x]   Patient   []   Family   []  Other     Preference for Learning:   [x]   Verbal   []   Written   []  Demonstration     Level of Comprehension & Competence:    [x]  Good                                      [] Fair                                     []  Poor                             []  Needs Reinforcement   [x]  Teachback completed    Education Component:   [x]  Medication management, including how to administer insulin (if appropriate) and potential medication interactions pt uses Alianza insulin pump, states she has used it for 3 years. Pt confident in her ability to use insulin pump and able to navigate pump settings. Uses Lispro insulin, basal rate from midnight to 9am is 1.1 units/hr and from 9am to midnight 1.2 units per hour. Uses bolus wizard for mealtime coverage. Reviewed insulin pump agreement, pt has all of her supplies but insulin (discussed with pharmacy, will provide insulin to refill cartridge this evening).  Pt follows up with endocrinologist, reports good control and previous A1c of 6.5%    []  Nutritional management -obtain usual meal pattern   []  Exercise   []  Signs, symptoms, and treatment of hyperglycemia and hypoglycemia   [] Prevention, recognition and treatment of hyperglycemia and hypoglycemia   [x]  Importance of blood glucose monitoring and how to obtain a blood glucose meter    []  Instruction on use of the blood glucose meter   [x]  Discuss the importance of HbA1C monitoring    []  Sick day guidelines   []  Proper use and disposal of lancets, needles, syringes or insulin pens (if appropriate)   []  Potential long-term complications (retinopathy, kidney disease, neuropathy, foot care)   [] Information about whom to contact in case of emergency or for more information    [x]  Goal:  Patient/family will demonstrate understanding of Diabetes Self Management Skills by: 1/28/20  Plan for post-discharge education or self-management support:    [] Outpatient class schedule provided            [] Patient Declined    [] Scheduled for outpatient classes (date) _______  Verify:  Does patient understand how diabetes medications work? yes  Does patient know what their most recent A1c is?  Reports last A1c 6.5%  Does patient monitor glucose at home? yes  Does patient have difficulty obtaining diabetes medications or testing supplies? no       Bella Abdi RD, CDE

## 2020-01-21 NOTE — PROGRESS NOTES
PHYSICAL THERAPY EVALUATION AND DISCHARGE    Patient: Keyanna Nunez (73 y.o. female)  Date: 1/21/2020  Primary Diagnosis: Sepsis ([facility] [address] [A41.9]        Precautions:   Fall    PLOF: Patient was independent with all mobility including gait without use of an assistive device. Patient lives with her fiance in a single story home with 4 steps to enter B handrails. 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 ASSESSMENT :  PT orders received and patient cleared by nursing to participate with therapy. Patient is a 75 y.o. female admitted to the hospital due to sepsis/acute kidney injury. Patient consents to PT evaluation and treatment. Patient received supine in bed with EOB elevated. She performed bed mobility including supine to sit and scooting with modified independence. She demonstrates good sitting balance while seated at EOB and performs sit to stand with modified independence. Patient ambulated 100ft supervision without use of assistive device. Patient ascended/descended 4 stairs supervision with B HR. Patient ascends and descends reciprocally stepping somewhat sideways especially upon descent. Patient educated regarding importance of out of bed mobility 3-5x throughout the day as well as performance of seated therapeutic exercise. Session ended with patient seated upright in recliner with all needs met. Patient cleared by physical therapy for safe discharge home. Patient does not require further skilled physical therapy at this level of care. PLAN :  Recommendations and Planned Interventions:   No skilled inpatient physical therapy needs identified at this time. Discharge Recommendations: Outpatient as needed  Further Equipment Recommendations for Discharge: N/A     SUBJECTIVE:   Patient stated The kids keep me young.     OBJECTIVE DATA SUMMARY:     Past Medical History:   Diagnosis Date    Arthritis     Arthropathy, unspecified, site unspecified     Asthma 11/16/2018    Resolved, per patient    Breast CA ([facility] [address] 2/28/2014    Right Breast  1-14-19  resolved with surgery    Chemotherapy convalescence or palliative care     resolved    CKD (chronic kidney disease)     Diabetes mellitus     Family history of breast cancer     GERD (gastroesophageal reflux disease) 11/16/2018    Resolved, per patient    Hematuria     Hiatus hernia syndrome     resolved, per pt.  8-6-18    Hypertension     Insulin pump in place     Kidney stone     Lymphedema     right arm    Microhematuria     Morbid obesity ([facility] [address] 11/16/2018    BMI = 47.4    Nausea with vomiting     Postop N/V    Osteopenia     Radiation therapy complication     8-53-80 resolved, per patient    Recurrent UTI     Renal colic     S/P breast biopsy - right breast calcifications 10/18/10     Shortness of breath     With exertion. 8-6-18  pt denies     Staghorn calculus     Struvite kidney stones     Thyroid dysfunction     GOITER    Urethral diverticulum     Urine leukocytes     UTI (lower urinary tract infection)      Past Surgical History:   Procedure Laterality Date    HX BREAST BIOPSY  10/18/10    stereotactic - right breast calcifications    HX BREAST BIOPSY  2/14/2014    RIGHT BREAST BIOPSY performed by Raissa Boss MD at [address] [place] [address] HX CYST REMOVAL  1997    Bilateral breast.     HX GI      colonoscopy    HX GYN      urethra reconstruction x3    HX HYSTERECTOMY      HX MODIFIED RADICAL MASTECTOMY  3/21/2014    RIGHT MODIFIED RADICAL MASTECTOMY SENTINAL LYMPH NODE BIOPSY performed by Raissa Boss MD at [facility] MA[facility] OR    HX TUBAL LIGATION      HX UROLOGICAL      4 diverticuli repaired    HX UROLOGICAL  03/29/2011    Cysto, percutaneous nephrolithotomy, antegrade URS, antegrade pyelogram.  Dr. Abhilash Eastman, [facility].    HX UROLOGICAL  12/11/2009    Cysto, flexible URS, right RPG, lasertripsy, JJ stent placement. Dr. Abhilash Eastman, [facility] [place].    HX UROLOGICAL  11/20/2009    Second look right attempted percutaneous nephrolithotomy. Dr. Mariella Hand, [facility].    HX UROLOGICAL  10/23/2009    Cysto, right percutaneous nephrolithotomy. Dr. Mariella Hand, [facility].    HX UROLOGICAL  08/07/2018    Cysto, bilat RPG, right URS, HLL, right JJ stent placement, right nephrostomy tube removal, Dr. Joie Keys, [address]    HX UROLOGICAL  09/04/2018    cysto, right JJ stent removal, bilat RPG. Dr Joie Keys. [address].  HX UROLOGICAL  11/20/2018    cysto, bilat RPG, left URS, laser lithotripsy, left JJ stent place. Dr. Joie Keys. [address].  HX UROLOGICAL  01/25/2019    Left PCNL and removal of left JJ stent. Dr. Joie Keys at [address].  HX VASCULAR ACCESS  2014    IR GUIDE DIL URET TRACT EXIST INCL NEW ACCESS W TUBE PLACMENT LT  1/25/2019     Barriers to Learning/Limitations: None  Compensate with: N/A  Home Situation:   Home Situation  Home Environment: Private residence  # Steps to Enter: 4  Rails to Enter: Yes  Hand Rails : Bilateral  One/Two Story Residence: One story  Living Alone: No  Support Systems: Spouse/Significant Other/Partner  Patient Expects to be Discharged to[de-identified] Private residence  Current DME Used/Available at Home: Cane, straight, Raised toilet seat, Grab bars, Glucometer  Critical Behavior:  Neurologic State: Alert  Orientation Level: Oriented X4  Cognition: Appropriate decision making; Follows commands  Safety/Judgement: Fall prevention  Psychosocial  Patient Behaviors: Calm; Cooperative  Purposeful Interaction: Yes  Pt Identified Daily Priority: Clinical issues (comment)  [place]itas Process: Establish trust  Caring Interventions: Therapeutic modalities  Reassure: Therapeutic listening  Therapeutic Modalities: Intentional therapeutic touch  Skin Condition/Temp: Warm     Skin Integrity: Intact  Skin Integumentary  Skin Color: Appropriate for ethnicity  Skin Condition/Temp: Warm  Skin Integrity: Intact  Turgor: Non-tenting  Hair Growth: Present  Varicosities: Absent     B LE Strength:    Strength:  Within functional limits              B LE Tone & Sensation:   Tone: Normal          Sensation: Intact B LE Range Of Motion:  AROM: Within functional limits                 Posture:  Posture (WDL): Exceptions to WDL  Posture Assessment: Forward head  Functional Mobility:  Bed Mobility:  Supine to Sit: Modified independent  Scooting: Modified independent  Transfers:  Sit to Stand: Modified independent  Stand to Sit: Modified independent        Balance:   Sitting: Intact  Standing: Intact    Ambulation/Gait Training:  Distance (ft): 100 Feet (ft)  Assistive Device: (none)  Ambulation - Level of Assistance: Supervision  Speed/Rosalina: Slow     Stairs:  Number of Stairs Trained: 4  Stairs - Level of Assistance: Supervision  Rail Use: Both     Therapeutic Exercises:   Reviewed and performed ankle pumps to increase blood flow and circulation. Pain:  Pain level pre-treatment: 6/10 \"I know that pain. It's my kidneys\"  Pain level post-treatment: same  Pain Intervention(s): Medication (see MAR); Rest, Repositioning   Response to intervention: Nurse notified, See doc flow    Activity Tolerance:   good  Please refer to the flowsheet for vital signs taken during this treatment. After treatment:   [x]         Patient left in no apparent distress sitting up in chair  []         Patient left in no apparent distress in bed  [x]         Call bell left within reach  [x]   Personal items in reach  [x]         Nursing notified Rolando Rea  []         Caregiver present  []         Bed/chair alarm activated  []         SCDs applied      COMMUNICATION/EDUCATION:   [x]         Role of Physical Therapy in the acute care setting. [x]         Fall prevention education was provided and the patient/caregiver indicated understanding. [x]         Patient/family have participated as able in goal setting and plan of care. [x]         Patient/family agree to work toward stated goals and plan of care. []         Patient understands intent and goals of therapy, but is neutral about his/her participation.   []         Patient is unable to participate in goal setting/plan of care: ongoing with therapy staff. [x]         Out of bed with nursing assistance 3-5 times a day. []         Other: Thank you for this referral.  Hugh Ayala DPT  Time Calculation 23 minutes    Eval Complexity: History: MEDIUM  Complexity : 1-2 comorbidities / personal factors will impact the outcome/ POC Exam:HIGH Complexity : 4+ Standardized tests and measures addressing body structure, function, activity limitation and / or participation in recreation  Presentation: LOW Complexity : Stable, uncomplicated  Clinical Decision Making:Low Complexity    Overall Complexity:LOW      A student participated in this treatment session. Per CMS Medicare statements and guidelines I certify that the following was true:   1. I was present and directly observed the entire session. 2. I made all skilled judgments and clinical decisions regarding care. 3. I am the practitioner responsible for assessment, treatment, and documentation.     Thank you,   Kamryn Oh, PT, DPT

## 2020-01-21 NOTE — ROUTINE PROCESS
Bedside and Verbal shift change report given to Stephanie Melendrez RN (oncoming nurse) by Hollie Pino RN   (offgoing nurse). Report included the following information SBAR, Kardex, Intake/Output, MAR, Accordion, Recent Results, Med Rec Status and Cardiac Rhythm NSR.

## 2020-01-21 NOTE — PROGRESS NOTES
Reason for Admission:  Sepsis ([facility] [address] [A41.9]                 RRAT Score:    19            Plan for utilizing home health:    No                       Likelihood of Readmission:   Moderate                         Do you (patient/family) have any concerns for transition/discharge?  no    Transition of Care Plan:       Initial assessment completed with patient. Cognitive status of patient: oriented to time, place, person and situation. Face sheet information confirmed:  yes. The patient designates Fiance to participate in her discharge plan and to receive any needed information. This patient lives in a single family home with patient and boyfriend. Patient is able to navigate steps as needed. Prior to hospitalization, patient was considered to be independent with ADLs/IADLS : yes . Patient has a current ACP document on file: no  The patient and other:  Niece will be available to transport patient home upon discharge. The patient already has none reported,  medical equipment available in the home. Patient is not currently active with home health. Patient has not stayed in a skilled nursing facility or rehab. Was  stay within last 60 days : no. This patient is on dialysis :no      Freedom of choice signed: no. Currently, the discharge plan is Home. The patient states that she can obtain her medications from the pharmacy, and take her medications as directed. Patient's current insurance is [employer] and Medicare      Care Management Interventions  PCP Verified by CM:  Yes  Mode of Transport at Discharge: Self  Physical Therapy Consult: Yes  Occupational Therapy Consult: Yes  Current Support Network: Lives with Spouse  Confirm Follow Up Transport: Family  The Plan for Transition of Care is Related to the Following Treatment Goals : Home  Discharge Location  Discharge Placement: Home        Alejandro Vickers RN BSN  Care Manager  [phone]

## 2020-01-21 NOTE — DIABETES MGMT
NUTRITIONAL ASSESSMENT GLYCEMIC CONTROL/ PLAN OF CARE     Meghana Muarer           75 y.o.           1/18/2020                 1. Sepsis without acute organ dysfunction, due to unspecified organism ([address]    2. Pyelonephritis       INTERVENTIONS/PLAN:   Document all insulin boluses in LDA section under subcutaneous insulin infusion device   Continue inpatient monitoring and intervention  ASSESSMENT:   Pt is a 75year old female with a past medical history significant for  UTI, type 2 diabetes, staghorn kidney stones, obstructive uropathy, and ureteral calculus who presented with bilateral flank pain. Pt is using home insulin pump for glycemic control, able to navigate pump settings without difficulty. Pt reports feeling confident using the pump and has all supplies except her insulin. Discussed with pharmacy and MD, pt will need to refill pump this evening, order for insulin placed. Discussed pump agreement with patient, patient aware she is to let RN know anytime she gives insulin bolus (also discussed with RN). Also aware pump to be removed for any type of Xray procedure. Pt reports using pump successfully during prior hospital stays.      Diabetes Management:   Recent blood glucose:    1/20/2020 12:08 1/20/2020 16:58 1/20/2020 21:56 1/21/2020 08:16   139 (H) 222 (H) 203 (H) 178 (H)   Within target range (non-ICU: <140; ICU<180): [] Yes   [x]  No    Current Insulin regimen:   Home insulin pump  Home medication/insulin regimen:   Medtronic insulin pump using Lispro insulin   Basal Rates:   midnight to 9am: 1.1 units/hr  9 am to midnight: 1.2 units/hr  Bolus wizard for mealtime coverage  HbA1c: Pt reports recent A1c 6.5%  Adequate glycemic control PTA:  [x] Yes  [] No     SUBJECTIVE/OBJECTIVE:   Information obtained from: patient, chart review, staff    Diet: diabetic consistent carbohydrate, 2000 Kcal    Medications: [x] Reviewed     Most Recent POC Glucose:   Recent Labs     01/21/20  0106 01/19/20  0520 01/18/20  1800   * 323* 218*      Labs:   Lab Results   Component Value Date/Time    Hemoglobin A1c 11.1 (H) 09/25/2010 07:20 AM     Lab Results   Component Value Date/Time    Sodium 142 01/21/2020 01:06 AM    Potassium 4.0 01/21/2020 01:06 AM    Chloride 112 (H) 01/21/2020 01:06 AM    CO2 25 01/21/2020 01:06 AM    Anion gap 5 01/21/2020 01:06 AM    Glucose 180 (H) 01/21/2020 01:06 AM    BUN 19 (H) 01/21/2020 01:06 AM    Creatinine 1.32 (H) 01/21/2020 01:06 AM    Calcium 9.1 01/21/2020 01:06 AM    Magnesium 2.0 01/21/2020 01:06 AM    Phosphorus 2.9 01/21/2020 01:06 AM    Albumin 3.3 (L) 01/18/2020 06:00 PM     Anthropometrics: BMI (calculated): 50.1  Wt Readings from Last 1 Encounters:   01/21/20 112.5 kg (248 lb)      Ht Readings from Last 1 Encounters:   01/18/20 4' 11\" (1.499 m)     Estimated Nutrition Needs:  1337-1205 Kcal/day, 49-61 grams protein/day   Based on:   []Actual BW    [] IBW   [x] Adjusted BW  (61 kg)    Nutrition Diagnoses:    Altered nutrition related lab value related to diabetes as evidenced by elevated blood glucose >180 mg/dL   Nutrition Interventions: assessment of home management, diabetic diet  Goal: Blood glucose will be within target range of  mg/dL by 1/24/20     Nutrition Monitoring and Evaluation    []     Monitor po intake on meal rounds  [x]     Continue inpatient monitoring and intervention  []     Other:    Isabella Patient, [address], [address]

## 2020-01-21 NOTE — PROGRESS NOTES
Hospitalist Progress Note    Patient: Mary Cosby Age: 75 y.o. : 1955 MR#: [id number] SSN: [id number]  Date/Time: 2020 8:34 AM    DOA: 2020  PCP: Tello Reyes DO    Subjective:     Feels better today, still has flank pain. She knows to follow up with her Urology for further care. She was admitted for  Sepsis with UTI and staghorn calculi, on prolonged antibiotic until f/u Dr. Breezy Rashid  Renal function improved   She is on DM pump      ROS: no fever/chills, no headache, no dizziness, no facial pain, no sinus congestion,   No swallowing pain, No chest pain, no palpitation, no shortness of breath, no abd pain,+flank pain  No diarrhea, no urinary complaint, no leg pain or swelling      Assessment/Plan:   1. Sepsis (leukocytosis, fever, tachycardia) poa - source m/l uti - follow blood cx.   2.  UTI GNR, complicated in the setting of staghorn calculi       - CT with Mild hydroureteronephrosis bilaterally which may be acute or chronic.      - Urology recommended 21 days of antibiotics  Close op f/u with Dr. Breezy Rashid - his office to arrange  3. Hyperglycemia with DM2 - insulin pump ordered  4. Morbid Obesity Body mass index is 48.47 kg/m². 5. Anemia, microcytic, hypochromic - iron low, can have supplement  6. prerenal azotemia with CKD3, creatinine and function toward baseline  7. Hx breast ca s/p right modified radical mastectomy in 2014  8. Leukocytosis resolved   9. Dyslipidemia     Continue it ceftriaxone. Spoke with micro lab, final sensitivity is not available yet. No growth on blood culture. Resume home medications. Add iron and probiotic. ICS.      Full code     Additional Notes:    Time spent >30 minutes    Case discussed with:  [x]Patient  []Family  [x]Nursing  [x]Case Management  DVT Prophylaxis:  []Lovenox  [x]Hep SQ  []SCDs  []Coumadin   []On Heparin gtt    Signed By: Onel Loo MD     2020 8:34 AM              Objective:   VS:   Visit Vitals  /88 (BP 1 Location: Left arm, BP Patient Position: At rest)   Pulse 89   Temp 98.5 °F (36.9 °C)   Resp 19   Ht 4' 11\" (1.499 m)   Wt 112.5 kg (248 lb)   SpO2 100%   Breastfeeding No   BMI 50.09 kg/m²      Tmax/24hrs: Temp (24hrs), Av.6 °F (37 °C), Min:98.4 °F (36.9 °C), Max:98.8 °F (37.1 °C)      Intake/Output Summary (Last 24 hours) at 2020 0834  Last data filed at 2020 0550  Gross per 24 hour   Intake 2432.08 ml   Output --   Net 2432.08 ml     General:  Cooperative, Not in acute distress, speaks in full sentence while in bed  obese  HEENT: PERRL, EOMI, supple neck, no JVD, dry oral mucosa  Cardiovascular: S1S2 regular, no rub/gallop   Pulmonary: Clear air entry bilaterally, no wheezing, no crackle  GI:  Soft, non tender, non distended, +bs, no guarding   Extremities:  No pedal edema, +distal pulses appreciated   Neuro: AOx3, moving all extremities, no gross deficit.      Additional:       Current Facility-Administered Medications   Medication Dose Route Frequency    insulin pump (PATIENT SUPPLIED)   SubCUTAneous PRN    glucose chewable tablet 16 g  4 Tab Oral PRN    glucagon (GLUCAGEN) injection 1 mg  1 mg IntraMUSCular PRN    dextrose 10% infusion 125-250 mL  125-250 mL IntraVENous PRN    sodium chloride (NS) flush 5-40 mL  5-40 mL IntraVENous Q8H    sodium chloride (NS) flush 5-40 mL  5-40 mL IntraVENous PRN    lactated Ringers infusion  125 mL/hr IntraVENous CONTINUOUS    acetaminophen (TYLENOL) tablet 650 mg  650 mg Oral Q4H PRN    heparin (porcine) injection 5,000 Units  5,000 Units SubCUTAneous Q8H    cefTRIAXone (ROCEPHIN) 1 g in sterile water (preservative free) 10 mL IV syringe  1 g IntraVENous Q12H    traMADol (ULTRAM) tablet 100 mg  100 mg Oral Q6H PRN    famotidine (PF) (PEPCID) 20 mg in 0.9% sodium chloride 10 mL injection  20 mg IntraVENous DAILY    atorvastatin (LIPITOR) tablet 40 mg  40 mg Oral QHS    letrozole (FEMARA) tablet 2.5 mg  2.5 mg Oral QPM    levothyroxine (SYNTHROID) tablet 88 mcg  88 mcg Oral 6am    losartan (COZAAR) tablet 50 mg  50 mg Oral DAILY    therapeutic multivitamin (THERAGRAN) tablet 1 Tab  1 Tab Oral DAILY    sodium chloride (NS) flush 5-10 mL  5-10 mL IntraVENous PRN            Lab/Data Review:  Labs: Results:       Chemistry Recent Labs     01/21/20  0106 01/19/20  0520 01/18/20  1800   * 323* 218*    140 143   K 4.0 4.1 4.2   * 112* 111   CO2 25 23 27   BUN 19* 27* 33*   CREA 1.32* 1.65* 1.85*   BUCR 14 16 18   AGAP 5 5 5   CA 9.1 8.1* 9.3   PHOS 2.9  --   --      Recent Labs     01/18/20  1800   ALT 25   SGOT 22   TP 7.6   ALB 3.3*   GLOB 4.3*   AGRAT 0.8      CBC w/Diff Recent Labs     01/21/20 0106 01/19/20  0520 01/18/20  1800   WBC 9.6 16.3* 12.5   RBC 4.61 4.22 5.13   HGB 12.0 10.9* 13.2   HCT 36.8 33.1* 40.4   MCV 79.8 78.4 78.8   MCH 26.0 25.8 25.7   MCHC 32.6 32.9 32.7   RDW 14.0 14.3 14.5    226 269   GRANS 59 80* 90*   LYMPH 32 14* 6*   EOS 3 0 2      Coagulation No results for input(s): PTP, INR, APTT, INREXT in the last 72 hours.     Iron/Ferritin Lab Results   Component Value Date/Time    Iron 40 (L) 01/21/2020 01:06 AM    TIBC 247 (L) 01/21/2020 01:06 AM    Iron % saturation 16 01/21/2020 01:06 AM    Ferritin 84 01/21/2020 01:06 AM       BNP    Cardiac Enzymes No results found for: CPK, RCK1, RCK2, RCK3, RCK4, CKMB, CKNDX, CKND1, TROPT, TROIQ, BNPP, BNP     Lactic Acid    Thyroid Studies          All Micro Results     Procedure Component Value Units Date/Time    CULTURE, BLOOD [[id number]] Collected:  01/18/20 1810    Order Status:  Completed Specimen:  Blood Updated:  01/21/20 0713     Special Requests: NO SPECIAL REQUESTS        Culture result: NO GROWTH 3 DAYS       CULTURE, BLOOD [[id number]] Collected:  01/18/20 1910    Order Status:  Completed Specimen:  Blood Updated:  01/21/20 0713     Special Requests: NO SPECIAL REQUESTS        Culture result: NO GROWTH 3 DAYS       CULTURE, URINE [[id number]]  (Abnormal) Collected:  01/18/20 2150    Order Status:  Completed Specimen:  Urine from Clean catch Updated:  01/20/20 1022     Special Requests: NO SPECIAL REQUESTS        Culture result:       >100,000 COLONIES/mL GRAM NEGATIVE RODS                  <10,000 COLONIES/mL 2ND GRAM NEGATIVE KATARINA                  Images:    CT (Most Recent). CT Results (most recent):  [facility]   CT ABD PELV WO CONT    Narrative EXAM: CT of the Abdomen and Pelvis without IV contrast    CLINICAL INDICATION:  hx of kidney stones, septic    TECHNIQUE: CT of the abdomen and pelvis. Sagittal and coronal reformations    All CT scans at this facility are performed using dose optimization technique as  appropriate to a performed exam, to include automated exposure control,  adjustment of the mA and/or kV according to patient size (including appropriate  matching for site specific examination) or use of iterative reconstruction  technique. IV CONTRAST: None    ENTERIC CONTRAST: None    COMPARISON: CT dated 12/11/2014    FINDINGS:   Limitations: The evaluation of the solid organs is limited due to the lack of IV  contrast.    Lower Chest: Mild scarring in the right middle lobe is noted. This is unchanged  from prior CT. No effusion appreciated. No consolidation identified. The heart  and pericardium are unremarkable. Peritoneum: No free air appreciated. No free fluid present. No fluid collections  present. Liver: No focal lesion appreciated. Biliary/Gallbladder: No intrahepatic or extrahepatic biliary ductal dilatation  appreciated. The gallbladder is unremarkable. No pericholecystic fluid or  inflammation. Spleen: Unremarkable. Pancreas: No focal lesion appreciated. The pancreatic duct is unremarkable. No  peripancreatic inflammation or adenopathy. : The adrenal glands are unremarkable. Within the superior pole of the right  kidney, there is a 4 mm stone.  In the interpolar region, there is a 6 cm stone. In the inferior pole the left kidney, there is a irregular likely staghorn  calculus measuring 1.6 x 0.7 cm. Slightly inferiorly, there were 3 small stones  2 of which are 3 mm and the other is 2 mm. Mild perinephric fat changes are  present bilaterally. There is lobulation of both kidneys with areas of likely  volume loss. There is minimal prominence of the collecting system bilaterally. This was likely present previously. There is mild prominence of the ureter  bilaterally without evidence of obstructing stone. The bladder is grossly  unremarkable. A few pelvic fluid ligaments are noted. The uterus is surgically  absent. GI: The stomach is unremarkable. The small bowel is without evidence of  obstruction. No small bowel wall thickening. The mesentery is without  inflammation or adenopathy. The appendix is unremarkable. Diverticula are  present within the descending and sigmoid colon. No significant diverticulitis  appreciated. Aorta and retroperitoneum: No aortic aneurysm seen. . Nonspecific periaortic  lymph nodes are noted. The IVC is grossly unremarkable. No fluid collections in  the retroperitoneum appreciated. Abdominal wall/Inguinal: Unremarkable     Musculoskeletal: Mild multilevel degenerative disc disease and facet arthropathy  are noted. Mild degenerative changes of the hips are present. Impression IMPRESSION:   1. Mild hydroureteronephrosis bilaterally which may be acute or chronic. No  distinct obstructing process appreciated. Mild bilateral perinephric fat  changes. The possibility of infection should be considered. Correlation with  urinalysis and laboratory values is recommended. Bilateral renal stones with staghorn type in the lower pole of the left kidney.              XRAY (Most Recent) XR Results (most recent):  Results from Hospital Encounter encounter on 01/18/20   XR CHEST PORT    Narrative EXAM: CHEST ONE VIEW  portable 1749 hours    CLINICAL HISTORY/INDICATION: Sepsis    COMPARISON: Chest x-ray April 30, 2014. TECHNIQUE: One view obtained. FINDINGS:     The cardiac and mediastinal silhouette is normal. The lungs are clear. Pulmonary  vascularity is normal. The costophrenic angles are sharply defined. Mild disc  space narrowing with marginal spurring involves the mid and lower thoracic  spine. .      Impression IMPRESSION:    No acute finding. EKG No results found for this or any previous visit.      2D ECHO

## 2020-01-21 NOTE — PROGRESS NOTES
Bedside shift change report given to TERA Ziegler (oncoming nurse) by Libia Wilson RN (offgoing nurse). Report included the following information SBAR, Kardex and MAR.

## 2020-01-21 NOTE — PROGRESS NOTES
OCCUPATIONAL THERAPY EVALUATION/DISCHARGE    Patient: Amelie Sampson (27 y.o. female)  Date: 1/21/2020  Primary Diagnosis: Sepsis ([facility] [address] [address] [A41.9]        Precautions:   Fall  PLOF: Pt was independent with basic self care tasks and functional mobility PTA. ASSESSMENT AND RECOMMENDATIONS:  Based on the objective data described below, the patient is able to perform basic self care tasks and functional transfers without assistance. Patient has a supportive fiance at home to assist her prn. No DME needs identified. Skilled occupational therapy is not indicated at this time. Discharge Recommendations: None  Further Equipment Recommendations for Discharge: N/A      SUBJECTIVE:   Patient stated I hope to go home.     OBJECTIVE DATA SUMMARY:     Past Medical History:   Diagnosis Date    Arthritis     Arthropathy, unspecified, site unspecified     Asthma 11/16/2018    Resolved, per patient    [facility] 2/28/2014    Right Breast  1-14-19  resolved with surgery    Chemotherapy convalescence or palliative care     resolved    CKD (chronic kidney disease)     Diabetes mellitus     Family history of breast cancer     GERD (gastroesophageal reflux disease) 11/16/2018    Resolved, per patient    Hematuria     Hiatus hernia syndrome     resolved, per pt.  8-6-18    Hypertension     Insulin pump in place     Kidney stone     Lymphedema     right arm    Microhematuria     Morbid obesity ([facility] [address] 11/16/2018    BMI = 47.4    Nausea with vomiting     Postop N/V    Osteopenia     Radiation therapy complication     6-71-79 resolved, per patient    Recurrent UTI     Renal colic     S/P breast biopsy - right breast calcifications 10/18/10     Shortness of breath     With exertion.  8-6-18  pt denies     Staghorn calculus     Struvite kidney stones     Thyroid dysfunction     GOITER    Urethral diverticulum     Urine leukocytes     UTI (lower urinary tract infection)      Past Surgical History:   Procedure Laterality Date    HX BREAST BIOPSY  10/18/10    stereotactic - right breast calcifications    HX BREAST BIOPSY  2/14/2014    RIGHT BREAST BIOPSY performed by Alessandra Deleon MD at [facility] MAIN OR    HX CYST REMOVAL  1997    Bilateral breast.     HX GI      colonoscopy    HX GYN      urethra reconstruction x3    HX HYSTERECTOMY      HX MODIFIED RADICAL MASTECTOMY  3/21/2014    RIGHT MODIFIED RADICAL MASTECTOMY SENTINAL LYMPH NODE BIOPSY performed by Alessandra Deleon MD at [facility] MAIN OR    HX TUBAL LIGATION      HX UROLOGICAL      4 diverticuli repaired    HX UROLOGICAL  03/29/2011    Cysto, percutaneous nephrolithotomy, antegrade URS, antegrade pyelogram.  Dr. Hitesh Thomas, [facility]. HX UROLOGICAL  12/11/2009    Cysto, flexible URS, right RPG, lasertripsy, JJ stent placement. Dr. Hitesh Thomas, [facility]. HX UROLOGICAL  11/20/2009    Second look right attempted percutaneous nephrolithotomy. Dr. Hitesh Thomas, [facility].  UROLOGICAL  10/23/2009    Cysto, right percutaneous nephrolithotomy. Dr. Hitesh Thomas, [facility].  UROLOGICAL  08/07/2018    Cysto, bilat RPG, right URS, HLL, right JJ stent placement, right nephrostomy tube removal, Dr. Ramin Dye, [address] [place] [address] UROLOGICAL  09/04/2018    cysto, right JJ stent removal, bilat RPG. Dr Ramin Dye. [facility].  UROLOGICAL  11/20/2018    cysto, bilat RPG, left URS, laser lithotripsy, left JJ stent place. Dr. Ramin Dye. [facility].  UROLOGICAL  01/25/2019    Left PCNL and removal of left JJ stent. Dr. Ramin Dye at [facility].     HX VASCULAR ACCESS  2014    IR GUIDE DIL URET TRACT EXIST INCL NEW ACCESS W TUBE PLACMENT LT  1/25/2019     Barriers to Learning/Limitations: None  Compensate with: visual, verbal, tactile, kinesthetic cues/model    Home Situation:   Home Situation  Home Environment: Private residence  # Steps to Enter: 4  Rails to Enter: Yes  Hand Rails : Bilateral  One/Two Story Residence: One story  Living Alone: No  Support Systems: Spouse/Significant Other/Partner  Patient Expects to be Discharged to[de-identified] Private residence  Current DME Used/Available at Home: Cane, straight, Raised toilet seat, Grab bars, Glucometer  Tub or Shower Type: Shower(with grab bar)  [x]     Right hand dominant   []     Left hand dominant    Cognitive/Behavioral Status:  Neurologic State: Alert  Orientation Level: Oriented X4  Cognition: Appropriate decision making; Follows commands  Safety/Judgement: Awareness of environment; Fall prevention    Skin: Intact on UEs  Edema: None noted in UEs    Vision/Perceptual:    Acuity: Within Defined Limits    Corrective Lenses: Glasses    Coordination: BUE  Coordination: Within functional limits  Fine Motor Skills-Upper: Left Intact; Right Intact    Gross Motor Skills-Upper: Left Intact; Right Intact    Balance:  Sitting: Intact  Standing: Intact    Strength: BUE  Strength: Within functional limits    Tone & Sensation: BUE  Tone: Normal  Sensation: Intact    Range of Motion: BUE  AROM: Within functional limits    Functional Mobility and Transfers for ADLs:  Bed Mobility:  Supine to Sit: Modified independent  Scooting: Modified independent  Transfers:  Sit to Stand: Modified independent  Stand to Sit: Modified independent   Toilet Transfer : Modified independent    ADL Assessment:  Feeding: Independent    Oral Facial Hygiene/Grooming: Independent    Bathing: Modified independent    Upper Body Dressing: Independent    Lower Body Dressing: Modified independent    Toileting: Modified independent    Pain:  Pain level pre-treatment: 5/10, aching in back   Pain level post-treatment: 5/10, aching in back   Pain Intervention(s): Medication (see MAR); Rest  Response to intervention: Nurse notified, See doc flow    Activity Tolerance:   Good  Please refer to the flowsheet for vital signs taken during this treatment.   After treatment:   []  Patient left in no apparent distress sitting up in chair  [x]  Patient left in no apparent distress in bed  [x]  Call bell left within reach  [x]  Nursing notified  []  Caregiver present  []  Bed alarm activated    COMMUNICATION/EDUCATION:   [x]      Role of Occupational Therapy in the acute care setting  [x]      Home safety education was provided and the patient/caregiver indicated understanding. [x]      Patient/family have participated as able and agree with findings and recommendations. []      Patient is unable to participate in plan of care at this time. Thank you for this referral.  Heather Laura MS OTR/L   Time Calculation: 13 mins      Eval Complexity: History: LOW Complexity : Brief history review ; Examination: LOW Complexity : 1-3 performance deficits relating to physical, cognitive , or psychosocial skils that result in activity limitations and / or participation restrictions ;    Decision Making:LOW Complexity : No comorbidities that affect functional and no verbal or physical assistance needed to complete eval tasks

## 2020-01-22 VITALS
DIASTOLIC BLOOD PRESSURE: 83 MMHG | HEART RATE: 89 BPM | BODY MASS INDEX: 50.02 KG/M2 | SYSTOLIC BLOOD PRESSURE: 151 MMHG | HEIGHT: 59 IN | WEIGHT: 248.1 LBS | RESPIRATION RATE: 20 BRPM | OXYGEN SATURATION: 97 % | TEMPERATURE: 98.4 F

## 2020-01-22 PROBLEM — N20.0 STAGHORN KIDNEY STONES: Status: RESOLVED | Noted: 2019-01-25 | Resolved: 2020-01-22

## 2020-01-22 PROBLEM — C50.411 MALIGNANT NEOPLASM OF UPPER-OUTER QUADRANT OF RIGHT FEMALE BREAST (HCC): Status: RESOLVED | Noted: 2017-05-12 | Resolved: 2020-01-22

## 2020-01-22 PROBLEM — E66.01 MORBID OBESITY (HCC): Chronic | Status: ACTIVE | Noted: 2018-02-05

## 2020-01-22 PROBLEM — N20.1 URETERAL CALCULUS, RIGHT: Status: RESOLVED | Noted: 2018-07-10 | Resolved: 2020-01-22

## 2020-01-22 PROBLEM — N12 PYELONEPHRITIS: Status: RESOLVED | Noted: 2018-07-10 | Resolved: 2020-01-22

## 2020-01-22 PROBLEM — I97.2 POSTMASTECTOMY LYMPHEDEMA SYNDROME OF RIGHT UPPER EXTREMITY: Status: ACTIVE | Noted: 2020-01-18

## 2020-01-22 PROBLEM — N20.0 STAGHORN RENAL CALCULUS: Status: ACTIVE | Noted: 2020-01-18

## 2020-01-22 PROBLEM — I97.2 POSTMASTECTOMY LYMPHEDEMA SYNDROME OF RIGHT UPPER EXTREMITY: Chronic | Status: ACTIVE | Noted: 2020-01-18

## 2020-01-22 PROBLEM — N18.30 ACUTE RENAL FAILURE SUPERIMPOSED ON STAGE 3 CHRONIC KIDNEY DISEASE (HCC): Status: ACTIVE | Noted: 2020-01-18

## 2020-01-22 PROBLEM — M62.838 MUSCLE SPASM: Status: RESOLVED | Noted: 2017-08-18 | Resolved: 2020-01-22

## 2020-01-22 PROBLEM — T83.098A MALFUNCTION OF NEPHROSTOMY TUBE (HCC): Status: RESOLVED | Noted: 2018-08-03 | Resolved: 2020-01-22

## 2020-01-22 PROBLEM — N20.0 KIDNEY STONES: Status: RESOLVED | Noted: 2019-01-25 | Resolved: 2020-01-22

## 2020-01-22 PROBLEM — E11.21 TYPE 2 DIABETES WITH NEPHROPATHY (HCC): Status: RESOLVED | Noted: 2018-09-20 | Resolved: 2020-01-22

## 2020-01-22 PROBLEM — N13.9 OBSTRUCTIVE UROPATHY: Status: RESOLVED | Noted: 2018-07-10 | Resolved: 2020-01-22

## 2020-01-22 PROBLEM — N17.9 ACUTE RENAL FAILURE SUPERIMPOSED ON STAGE 3 CHRONIC KIDNEY DISEASE (HCC): Status: ACTIVE | Noted: 2020-01-18

## 2020-01-22 LAB
ANION GAP SERPL CALC-SCNC: 4 MMOL/L (ref 3–18)
BACTERIA SPEC CULT: ABNORMAL
BUN SERPL-MCNC: 19 MG/DL (ref 7–18)
BUN/CREAT SERPL: 12 (ref 12–20)
CALCIUM SERPL-MCNC: 8.8 MG/DL (ref 8.5–10.1)
CHLORIDE SERPL-SCNC: 107 MMOL/L (ref 100–111)
CO2 SERPL-SCNC: 27 MMOL/L (ref 21–32)
CREAT SERPL-MCNC: 1.55 MG/DL (ref 0.6–1.3)
ERYTHROCYTE [DISTWIDTH] IN BLOOD BY AUTOMATED COUNT: 14 % (ref 11.6–14.5)
GLUCOSE BLD STRIP.AUTO-MCNC: 169 MG/DL (ref 70–110)
GLUCOSE BLD STRIP.AUTO-MCNC: 232 MG/DL (ref 70–110)
GLUCOSE SERPL-MCNC: 168 MG/DL (ref 74–99)
HCT VFR BLD AUTO: 36.5 % (ref 35–45)
HGB BLD-MCNC: 11.9 G/DL (ref 12–16)
MCH RBC QN AUTO: 25.9 PG (ref 24–34)
MCHC RBC AUTO-ENTMCNC: 32.6 G/DL (ref 31–37)
MCV RBC AUTO: 79.3 FL (ref 74–97)
PLATELET # BLD AUTO: 284 K/UL (ref 135–420)
PMV BLD AUTO: 9.6 FL (ref 9.2–11.8)
POTASSIUM SERPL-SCNC: 3.9 MMOL/L (ref 3.5–5.5)
RBC # BLD AUTO: 4.6 M/UL (ref 4.2–5.3)
SERVICE CMNT-IMP: ABNORMAL
SODIUM SERPL-SCNC: 138 MMOL/L (ref 136–145)
WBC # BLD AUTO: 9.7 K/UL (ref 4.6–13.2)

## 2020-01-22 PROCEDURE — 74011000250 HC RX REV CODE- 250: Performed by: HOSPITALIST

## 2020-01-22 PROCEDURE — 74011250637 HC RX REV CODE- 250/637: Performed by: HOSPITALIST

## 2020-01-22 PROCEDURE — 74011250637 HC RX REV CODE- 250/637: Performed by: INTERNAL MEDICINE

## 2020-01-22 PROCEDURE — 36415 COLL VENOUS BLD VENIPUNCTURE: CPT

## 2020-01-22 PROCEDURE — 74011250636 HC RX REV CODE- 250/636: Performed by: HOSPITALIST

## 2020-01-22 PROCEDURE — 80048 BASIC METABOLIC PNL TOTAL CA: CPT

## 2020-01-22 PROCEDURE — 82962 GLUCOSE BLOOD TEST: CPT

## 2020-01-22 PROCEDURE — 85027 COMPLETE CBC AUTOMATED: CPT

## 2020-01-22 RX ORDER — TRAMADOL HYDROCHLORIDE 50 MG/1
100 TABLET ORAL
Qty: 20 TAB | Refills: 0 | Status: SHIPPED | OUTPATIENT
Start: 2020-01-22 | End: 2020-01-27

## 2020-01-22 RX ORDER — GRANULES FOR ORAL 3 G/1
3 POWDER ORAL
Qty: 3 PACKET | Refills: 0 | Status: SHIPPED | OUTPATIENT
Start: 2020-01-22 | End: 2020-01-29

## 2020-01-22 RX ORDER — LANOLIN ALCOHOL/MO/W.PET/CERES
325 CREAM (GRAM) TOPICAL
Qty: 30 TAB | Refills: 0 | Status: SHIPPED | OUTPATIENT
Start: 2020-01-22 | End: 2020-10-27

## 2020-01-22 RX ORDER — CIPROFLOXACIN 500 MG/1
500 TABLET ORAL EVERY 12 HOURS
Qty: 28 TAB | Refills: 0 | Status: SHIPPED | OUTPATIENT
Start: 2020-01-22 | End: 2020-02-19

## 2020-01-22 RX ORDER — CIPROFLOXACIN 500 MG/1
500 TABLET ORAL EVERY 12 HOURS
Status: DISCONTINUED | OUTPATIENT
Start: 2020-01-22 | End: 2020-01-22 | Stop reason: HOSPADM

## 2020-01-22 RX ADMIN — LOSARTAN POTASSIUM 50 MG: 50 TABLET, FILM COATED ORAL at 09:01

## 2020-01-22 RX ADMIN — CIPROFLOXACIN HYDROCHLORIDE 500 MG: 500 TABLET, FILM COATED ORAL at 12:39

## 2020-01-22 RX ADMIN — SODIUM CHLORIDE, SODIUM LACTATE, POTASSIUM CHLORIDE, AND CALCIUM CHLORIDE 125 ML/HR: 600; 310; 30; 20 INJECTION, SOLUTION INTRAVENOUS at 05:58

## 2020-01-22 RX ADMIN — LEVOTHYROXINE SODIUM 88 MCG: 100 TABLET ORAL at 05:58

## 2020-01-22 RX ADMIN — FERROUS SULFATE TAB 325 MG (65 MG ELEMENTAL FE) 325 MG: 325 (65 FE) TAB at 09:01

## 2020-01-22 RX ADMIN — THERA TABS 1 TABLET: TAB at 09:01

## 2020-01-22 RX ADMIN — Medication 10 ML: at 06:10

## 2020-01-22 RX ADMIN — Medication 1 CAPSULE: at 09:01

## 2020-01-22 RX ADMIN — CEFTRIAXONE SODIUM 1 G: 1 INJECTION, POWDER, FOR SOLUTION INTRAMUSCULAR; INTRAVENOUS at 09:11

## 2020-01-22 RX ADMIN — FAMOTIDINE 20 MG: 20 TABLET, FILM COATED ORAL at 09:01

## 2020-01-22 NOTE — ROUTINE PROCESS
Assumed patient care. Bedside shift report received from Birdie Robbins. Patient in bed, awake, alert and oriented x3. Call light placed within reach. Bed in low position. 7:47 AM - Bedside shift change report given to Birdie Robbins (oncoming nurse) by Clare Pink RN (offgoing nurse). Report given with SBAR, Kardex, Intake/Output, MAR and Recent Results.

## 2020-01-22 NOTE — ROUTINE PROCESS
Bedside and Verbal shift change report given to Janelle Conard RN (oncoming nurse) by Albertina Hines   (offgoing nurse). Report included the following information SBAR, Kardex, Intake/Output, MAR, Accordion, Recent Results, Med Rec Status and Cardiac Rhythm NSR.

## 2020-01-22 NOTE — DIABETES MGMT
Glycemic Control: Pt visited this morning, reports she was able to change her insulin pump cartridge without difficulty this morning. Pt continues to use home insulin pump for glycemic control. Will continue inpatient monitoring and intervention.      Leydi Hopkins RD, CDE

## 2020-01-22 NOTE — DISCHARGE SUMMARY
Discharge Summary    Patient: Alesha Castellon               Sex: female          DOA: 1/18/2020         YOB: 1955      Age:  75 y.o.        LOS:  LOS: 4 days                Admit Date: 1/18/2020    Discharge Date: 1/22/2020    Primary care physician: Larissa Chaudhry DO    Discharge Diagnoses:    1. Sepsis, resolved   2. UTI with ESBL E.Coli, complicated in the setting of staghorn calculi       - CT with Mild hydroureteronephrosis bilaterally which may be acute or chronic.      - Close op f/u with Dr. Jackie Godoy - his office to arrange  3. Hyperglycemia with DM2 - insulin pump ordered  4. Morbid Obesity Body mass index is 48.47 kg/m². 5. Anemia, microcytic, hypochromic - iron low, can have supplement  6. prerenal azotemia with CKD3, creatinine and function toward baseline  7. Fever, resolved   8. Leukocytosis resolved     Discharge Condition: Good  Disposition: home  Code Status: full code    Follow up for Primary Care Physician:  1) she continues on oral antibiotics, please monitor for clinical improvement  2) she needs follow up with Urology within 1 week for further care   3) please monitor her renal function and adjust her medications as clinically indicated   4) please follow up Outpatient workup for her iron deficiency anemia       Hospital Course:   75 y.o female with morbid obesity, DM2 on insulin pump, h/o breast cancer, has recurrent struvite kidney stone and stent in the past, presented with urinary frequency and bilateral flank pain. In the ER, she was found to have fever 104. 1F with sepsis criteria for UTI. She was started on sepsis protocol. CT abd/pelv was done with finding below. Urology was consulted but no further intervention. She has improvement of her sepsis with IV antibiotics. Her azotemia corrected to baseline, her leukocytosis resolved.    Urine grew ESBL E.coli and ID was consulted for antibiotic selection as she has had multiple infection in the past.   Last 24 Hours: she has no overnight event, has improvement of her symptom. Walking around in her room without complaint. Flank pain improved but persisting. No fever. Leukocytosis resolved. Urine culture grew ESBL E.Coli, due her recurrent infection, ID was consulted for antibiotic selection. Ciprofloxacin and fosfomycin has been prescribed by ID and to be completed at home. She has been educated on Urology follow up for further care for her renal stone. She continues with her insulin pump therapy. ROS: no fever/chills, no headache, no dizziness, no facial pain, no sinus congestion,   No swallowing pain, No chest pain, no palpitation, no shortness of breath, no abd pain,  No diarrhea, no urinary complaint, no leg pain or swelling    VS:   Visit Vitals  /83 (BP 1 Location: Left arm, BP Patient Position: At rest)   Pulse 89   Temp 98.4 °F (36.9 °C)   Resp 20   Ht 4' 11\" (1.499 m)   Wt 112.5 kg (248 lb 1.6 oz)   SpO2 97%   Breastfeeding No   BMI 50.11 kg/m²      Tmax/24hrs: Temp (24hrs), Av.3 °F (36.8 °C), Min:97.7 °F (36.5 °C), Max:98.6 °F (37 °C)      Intake/Output Summary (Last 24 hours) at 2020 1139  Last data filed at 2020 0915  Gross per 24 hour   Intake 3750.83 ml   Output --   Net 3750.83 ml       General:  Cooperative, Not in acute distress, speaks in full sentence while in bed  HEENT: PERRL, EOMI, supple neck, no JVD, dry oral mucosa  Cardiovascular: S1S2 regular, no rub/gallop   Pulmonary: Clear air entry bilaterally, no wheezing, no crackle  GI:  Soft, non tender, non distended, +bs, no guarding   Extremities:  No pedal edema, +distal pulses appreciated   Neuro: AOx3, moving all extremities, no gross deficit.      Consults:   Urology: Dr. Judy Guzman  Infectious disease: Dr. Clark Jeter:   CT Results (most recent):  Results from Hospital Encounter encounter on 20   CT ABD PELV WO CONT    Narrative EXAM: CT of the Abdomen and Pelvis without IV contrast    CLINICAL INDICATION:  hx of kidney stones, septic    TECHNIQUE: CT of the abdomen and pelvis. Sagittal and coronal reformations    All CT scans at this facility are performed using dose optimization technique as  appropriate to a performed exam, to include automated exposure control,  adjustment of the mA and/or kV according to patient size (including appropriate  matching for site specific examination) or use of iterative reconstruction  technique. IV CONTRAST: None    ENTERIC CONTRAST: None    COMPARISON: CT dated 12/11/2014    FINDINGS:   Limitations: The evaluation of the solid organs is limited due to the lack of IV  contrast.    Lower Chest: Mild scarring in the right middle lobe is noted. This is unchanged  from prior CT. No effusion appreciated. No consolidation identified. The heart  and pericardium are unremarkable. Peritoneum: No free air appreciated. No free fluid present. No fluid collections  present. Liver: No focal lesion appreciated. Biliary/Gallbladder: No intrahepatic or extrahepatic biliary ductal dilatation  appreciated. The gallbladder is unremarkable. No pericholecystic fluid or  inflammation. Spleen: Unremarkable. Pancreas: No focal lesion appreciated. The pancreatic duct is unremarkable. No  peripancreatic inflammation or adenopathy. : The adrenal glands are unremarkable. Within the superior pole of the right  kidney, there is a 4 mm stone. In the interpolar region, there is a 6 cm stone. In the inferior pole the left kidney, there is a irregular likely staghorn  calculus measuring 1.6 x 0.7 cm. Slightly inferiorly, there were 3 small stones  2 of which are 3 mm and the other is 2 mm. Mild perinephric fat changes are  present bilaterally. There is lobulation of both kidneys with areas of likely  volume loss. There is minimal prominence of the collecting system bilaterally. This was likely present previously.  There is mild prominence of the ureter  bilaterally without evidence of obstructing stone. The bladder is grossly  unremarkable. A few pelvic fluid ligaments are noted. The uterus is surgically  absent. GI: The stomach is unremarkable. The small bowel is without evidence of  obstruction. No small bowel wall thickening. The mesentery is without  inflammation or adenopathy. The appendix is unremarkable. Diverticula are  present within the descending and sigmoid colon. No significant diverticulitis  appreciated. Aorta and retroperitoneum: No aortic aneurysm seen. . Nonspecific periaortic  lymph nodes are noted. The IVC is grossly unremarkable. No fluid collections in  the retroperitoneum appreciated. Abdominal wall/Inguinal: Unremarkable     Musculoskeletal: Mild multilevel degenerative disc disease and facet arthropathy  are noted. Mild degenerative changes of the hips are present. Impression IMPRESSION:   1. Mild hydroureteronephrosis bilaterally which may be acute or chronic. No  distinct obstructing process appreciated. Mild bilateral perinephric fat  changes. The possibility of infection should be considered. Correlation with  urinalysis and laboratory values is recommended. Bilateral renal stones with staghorn type in the lower pole of the left kidney. XR Results (most recent):  Results from Hospital Encounter encounter on 01/18/20   XR CHEST PORT    Narrative EXAM: CHEST ONE VIEW  portable 1749 hours    CLINICAL HISTORY/INDICATION: Sepsis    COMPARISON: Chest x-ray April 30, 2014. TECHNIQUE: One view obtained. FINDINGS:     The cardiac and mediastinal silhouette is normal. The lungs are clear. Pulmonary  vascularity is normal. The costophrenic angles are sharply defined. Mild disc  space narrowing with marginal spurring involves the mid and lower thoracic  spine. .      Impression IMPRESSION:    No acute finding.            Lab/Data Review:  Labs: Results:       Chemistry Recent Labs     01/22/20  0538 01/21/20  0106   * 180*   NA 138 142   K 3.9 4.0    112*   CO2 27 25   BUN 19* 19*   CREA 1.55* 1.32*   CA 8.8 9.1   AGAP 4 5   BUCR 12 14      CBC w/Diff Recent Labs     01/22/20  0538 01/21/20  0106   WBC 9.7 9.6   RBC 4.60 4.61   HGB 11.9* 12.0   HCT 36.5 36.8    253   GRANS  --  59   LYMPH  --  32   EOS  --  3      Coagulation No results for input(s): PTP, INR, APTT, INREXT in the last 72 hours. Iron/Ferritin Recent Labs     01/21/20  0106   IRON 40*      BNP No results for input(s): BNPP in the last 72 hours. Cardiac Enzymes No results for input(s): CPK, CKND1, ELICEO in the last 72 hours. No lab exists for component: CKRMB, TROIP   Liver Enzymes No results for input(s): TP, ALB, TBIL, AP, SGOT, GPT in the last 72 hours. No lab exists for component: DBIL   Thyroid Studies No results for input(s): T4, T3U, TSH, TSHEXT in the last 72 hours. No lab exists for component: T3RU       All Micro Results     Procedure Component Value Units Date/Time    CULTURE, URINE [[id number]]  (Abnormal)  (Susceptibility) Collected:  01/18/20 2150    Order Status:  Completed Specimen:  Urine from Clean catch Updated:  01/22/20 0922     Special Requests: NO SPECIAL REQUESTS        Culture result:       >100,000 COLONIES/mL ESCHERICHIA COLI ** (EXTENDED SPECTRUM BETA LACTAMASE ) **                  <10,000 COLONIES/mL 2ND GRAM NEGATIVE KATARINA                  ESBL CALLED TO AND CORRECTLY REPEATED BY:  EVARISTO SANDOVAL RN 2S 1/22/2020 0921 TO LAE      CULTURE, BLOOD [[id number]] Collected:  01/18/20 1910    Order Status:  Completed Specimen:  Blood Updated:  01/22/20 0744     Special Requests: NO SPECIAL REQUESTS        Culture result: NO GROWTH 4 DAYS       CULTURE, BLOOD [[id number]] Collected:  01/18/20 1810    Order Status:  Completed Specimen:  Blood Updated:  01/22/20 0744     Special Requests: NO SPECIAL REQUESTS        Culture result: NO GROWTH 4 DAYS                   Medications at discharge  including reasons for change and indications for new ones:   Current Discharge Medication List      START taking these medications    Details   ciprofloxacin HCl (CIPRO) 500 mg tablet Take 1 Tab by mouth every twelve (12) hours. Qty: 28 Tab, Refills: 0      fosfomycin (MONUROL) 3 gram pack oral packet Take 1 Packet by mouth every seventy-two (72) hours for 3 doses. Indications: urinary tract infection due to E. coli bacteria, esbl E.coli cystitis  Qty: 3 Packet, Refills: 0      traMADol (ULTRAM) 50 mg tablet Take 2 Tabs by mouth every six (6) hours as needed for Pain for up to 5 days. Max Daily Amount: 400 mg. Indications: pain  Qty: 20 Tab, Refills: 0    Associated Diagnoses: Staghorn renal calculus      ferrous sulfate 325 mg (65 mg iron) tablet Take 1 Tab by mouth daily (after lunch). Qty: 30 Tab, Refills: 0         CONTINUE these medications which have NOT CHANGED    Details   INTRAROSA 6.5 mg inst Insert 1 Suppository into vagina nightly. losartan (COZAAR) 50 mg tablet Take 1 Tab by mouth daily. atorvastatin (LIPITOR) 40 mg tablet       letrozole (FEMARA) 2.5 mg tablet Take 1 Tab by mouth every evening. Qty: 90 Tab, Refills: 3      potassium citrate (UROCIT-K) 15 mEq TbER tablet Take 1 Tab by mouth three (3) times daily (with meals). Qty: 180 Tab, Refills: 6      PROLIA 60 mg/mL injection 60 mg every 6 months. HUMALOG U-100 INSULIN 100 unit/mL injection 100 Units by SubCUTAneous route daily. Pt has insulin pump      levothyroxine (SYNTHROID) 88 mcg tablet Take 88 mcg by mouth daily. multivitamin (ONE A DAY) tablet Take 1 Tab by mouth daily.                      Pending laboratory work and tests: none    Activity: Activity as tolerated    Diet: Cardiac Diet, Diabetic Diet and Low fat, Low cholesterol    Wound Care: None needed        Sarah Smart MD  1/22/2020  11:39 AM

## 2020-01-22 NOTE — CONSULTS
Infectious Disease Consultation Note    Reason: sepsis, esbl e.coli UTI    Current abx Prior abx   Ceftriaxone since 1/18/20 Cefepime 1/18-1/19     Lines:       Assessment :     75 y.o. female with h/o staghorn calculi, recurrent UTI, chronic kidney disease admitted to [facility] on 1/18/20 with sudden onset fever, chills. H/o Recurrent Proteus UTI with recurrent Struvite stones and partial staghorn calculi  S/P multiple percutaneous procedures(PCNL) and ureteroscopies especially over the last 10 years.     Urine cultures 1/18 reveal >100,000 esbl e.coli, <10,000 gnr    Clinical presentation c/w sepsis (POA) due to esbl e.coli cystitis, left kidney pyelonephritis. No obstructive uropathy noted on CT scan 1/18. Clinically improving. Patient's prior urine cultures since 2017 reviewed. Has grown proteus, pseudomonas on multiple occasions which have been intermediately susceptible to quinolones or resistant to quinolones. No e.coli in urine cultures in the past.     Current E.coli is susceptible to ciprofloxacin with MELISSA <0.25. there is a risk that the bacteria can evolve resistance while on treatment. Hence, will use combination abx therapy with fosfomycin if patient able to afford it. Patient wishes to hold off on getting outpatient IV abx unless she fails to respond to po abx therapy since she wishes to work and doesn't wish to take risk of getting picc line. Benefits and risks of this approach explained to patient in details. She verbalized her understanding and wishes to proceed with oral antibiotics. Recommendations:    1. D/c ceftriaxone. Start ciprofloxacin x 14 days. Give fosfomycin 3 g po every 72 hours x 3 doses - scripts given to . 2. F/u with dr. Adriane Hdez to discuss definitive mx of renal stones. Patient is at high risk for recurrent UTI/sepsis/readmission. Thank you for consultation request. Above plan was discussed in details with patient,  and dr Debbie Styles.  Please call me if any further questions or concerns. Will continue to participate in the care of this patient. HPI:     75 y.o. female with h/o staghorn calculi, recurrent UTI, chronic kidney disease admitted to [facility] on 1/18/20 with sudden onset fever, chills. Patient has h/o recurrent Proteus/pseudomonas UTI with recurrent Struvite stones and partial staghorn calculi S/P multiple percutaneous procedures(PCNL) and ureteroscopies especially over the last 10 years. She follows with dr. Coco Peterson as outpatient. She states that she was last treated for UTI in 10/2019 with oral abx. She was relatively well up until earlier on the day prior to admission then suddenly developed shaking chills,fever and bladder discomfort, left flank pain. She had some hypotension and tachycardia on admission to ED. She was also febrile with Tm:104.  CT revealed bilateral renal calculi left side greater than Right. On the left there was a 1.6 cm cluster of stones in the lower pole. There was mild bilateral hydronephrosis on CT as well but no obstructing stone or transition point on CT was noted. she was started on ceftriaxone. Urology consulted. Urology recommended prolonged abx, outpatient follow up. Plans were to d/c patient today. Urine cultures 1/18 reveal >100,000 esbl e.coli, <10,000 gnr. I have been consulted for further recommendations. Patient states that she feels better. Left flank pain improved. Patient denies headaches, visual disturbances, sore throat, runny nose, earaches, cp, sob, chills, cough, abdominal pain, diarrhea,  pain or weakness in extremities. Past Medical History:   Diagnosis Date    Arthritis     Asthma 11/16/2018    Resolved, per patient    Breast CA ([address] 02/28/2014    Right Breast  1-14-19  resolved with surgery    Controlled diabetes mellitus with chronic kidney disease ([address]     stage 3    GERD (gastroesophageal reflux disease) 11/16/2018    Resolved, per patient    Hiatus hernia syndrome     resolved, per pt. 8-6-18    Hypertension     Insulin pump in place     Morbid obesity ([address] 11/16/2018    BMI = 47.4    Osteopenia     Postmastectomy lymphedema syndrome of right upper extremity 1/18/2020    Staghorn calculus     Struvite kidney stones     Thyroid dysfunction     GOITER    Urethral diverticulum        Past Surgical History:   Procedure Laterality Date    HX BREAST BIOPSY  10/18/10    stereotactic - right breast calcifications    HX BREAST BIOPSY  2/14/2014    RIGHT BREAST BIOPSY performed by Fallon Rg MD at [address] [place] [address] HX CYST REMOVAL  1997    Bilateral breast.     HX GI      colonoscopy    HX GYN      urethra reconstruction x3    HX HYSTERECTOMY      HX MODIFIED RADICAL MASTECTOMY  3/21/2014    RIGHT MODIFIED RADICAL MASTECTOMY SENTINAL LYMPH NODE BIOPSY performed by Fallon Rg MD at [facility] MAIN OR    HX TUBAL LIGATION      HX UROLOGICAL      4 diverticuli repaired    HX UROLOGICAL  03/29/2011    Cysto, percutaneous nephrolithotomy, antegrade URS, antegrade pyelogram.  Dr. Elizabeth Franz, [facility].     UROLOGICAL  12/11/2009    Cysto, flexible URS, right RPG, lasertripsy, JJ stent placement. Dr. Elizabeth Franz, [facility].     UROLOGICAL  11/20/2009    Second look right attempted percutaneous nephrolithotomy. Dr. Elizabeth Franz, [facility].     UROLOGICAL  10/23/2009    Cysto, right percutaneous nephrolithotomy. Dr. Elizabeth Franz, [facility].     UROLOGICAL  08/07/2018    Cysto, bilat RPG, right URS, HLL, right JJ stent placement, right nephrostomy tube removal, Dr. Adriane Hdez, [facility] UROLOGICAL  09/04/2018    cysto, right JJ stent removal, bilat RPG. Dr Adriane Hdez. [facility].   UROLOGICAL  11/20/2018    cysto, bilat RPG, left URS, laser lithotripsy, left JJ stent place. Dr. Adriane Hdez. [facility].   UROLOGICAL  01/25/2019    Left PCNL and removal of left JJ stent. Dr. Adriane Hdez at [facility].     HX VASCULAR ACCESS  2014    IR GUIDE DIL URET TRACT EXIST INCL NEW ACCESS W TUBE PLACMENT LT  1/25/2019       home Medication List    Details   INTRAROSA 6.5 mg inst Insert 1 Suppository into vagina nightly. losartan (COZAAR) 50 mg tablet Take 1 Tab by mouth daily. atorvastatin (LIPITOR) 40 mg tablet       letrozole (FEMARA) 2.5 mg tablet Take 1 Tab by mouth every evening. Qty: 90 Tab, Refills: 3      potassium citrate (UROCIT-K) 15 mEq TbER tablet Take 1 Tab by mouth three (3) times daily (with meals). Qty: 180 Tab, Refills: 6      PROLIA 60 mg/mL injection 60 mg every 6 months. HUMALOG U-100 INSULIN 100 unit/mL injection 100 Units by SubCUTAneous route daily. Pt has insulin pump      levothyroxine (SYNTHROID) 88 mcg tablet Take 88 mcg by mouth daily. multivitamin (ONE A DAY) tablet Take 1 Tab by mouth daily.              Current Facility-Administered Medications   Medication Dose Route Frequency    famotidine (PEPCID) tablet 20 mg  20 mg Oral DAILY    lactobacillus sp. 50 billion cpu (BIO-K PLUS) capsule 1 Cap  1 Cap Oral DAILY    ferrous sulfate tablet 325 mg  1 Tab Oral DAILY WITH BREAKFAST    insulin pump (PATIENT SUPPLIED)   SubCUTAneous PRN    glucose chewable tablet 16 g  4 Tab Oral PRN    glucagon (GLUCAGEN) injection 1 mg  1 mg IntraMUSCular PRN    dextrose 10% infusion 125-250 mL  125-250 mL IntraVENous PRN    sodium chloride (NS) flush 5-40 mL  5-40 mL IntraVENous Q8H    sodium chloride (NS) flush 5-40 mL  5-40 mL IntraVENous PRN    acetaminophen (TYLENOL) tablet 650 mg  650 mg Oral Q4H PRN    heparin (porcine) injection 5,000 Units  5,000 Units SubCUTAneous Q8H    cefTRIAXone (ROCEPHIN) 1 g in sterile water (preservative free) 10 mL IV syringe  1 g IntraVENous Q12H    traMADol (ULTRAM) tablet 100 mg  100 mg Oral Q6H PRN    atorvastatin (LIPITOR) tablet 40 mg  40 mg Oral QHS    letrozole (FEMARA) tablet 2.5 mg  2.5 mg Oral QPM    levothyroxine (SYNTHROID) tablet 88 mcg  88 mcg Oral 6am    losartan (COZAAR) tablet 50 mg  50 mg Oral DAILY    therapeutic multivitamin (THERAGRAN) tablet 1 Tab  1 Tab Oral DAILY    sodium chloride (NS) flush 5-10 mL  5-10 mL IntraVENous PRN       Allergies: Nsaids (non-steroidal anti-inflammatory drug) and Ampicillin    Family History   Problem Relation Age of Onset    Breast Cancer Sister 79    Diabetes Mother     Hypertension Mother     Breast Cancer Sister 39    Hypertension Other         Parent    Diabetes Other         parent    Stroke Other         parent    Hypertension Other         sibling    Diabetes Other         sibling    Osteoporosis Other         sibling    Breast Cancer Sister     Diabetes Sister     Diabetes Sister     Diabetes Sister      Social History     Socioeconomic History    Marital status: SINGLE     Spouse name: Not on file    Number of children: Not on file    Years of education: Not on file    Highest education level: Not on file   Occupational History    Occupation:      Employer: [employer] resource strain: Not on file    Food insecurity:     Worry: Not on file     Inability: Not on file    Transportation needs:     Medical: Not on file     Non-medical: Not on file   Tobacco Use    Smoking status: Never Smoker    Smokeless tobacco: Never Used   Substance and Sexual Activity    Alcohol use: Never     Frequency: Never     Comment: occasionally    Drug use: No    Sexual activity: Not on file   Lifestyle    Physical activity:     Days per week: Not on file     Minutes per session: Not on file    Stress: Not on file   Relationships    Social connections:     Talks on phone: Not on file     Gets together: Not on file     Attends [employer] service: Not on file     Active member of club or organization: Not on file     Attends meetings of clubs or organizations: Not on file     Relationship status: Not on file    Intimate partner violence:     Fear of current or ex partner: Not on file     Emotionally abused: Not on file     Physically abused: Not on file     Forced sexual activity: Not on file   Other Topics Concern    Not on file   Social History Narrative    Patient lives with her daughter, Sindi Casiano, [phone]    And her son-in-law and 2 granddaughters     Social History     Tobacco Use   Smoking Status Never Smoker   Smokeless Tobacco Never Used        Temp (24hrs), Av.3 °F (36.8 °C), Min:97.7 °F (36.5 °C), Max:98.6 °F (37 °C)    Visit Vitals  /78 (BP 1 Location: Left arm, BP Patient Position: At rest)   Pulse (!) 102   Temp 98.6 °F (37 °C)   Resp 20   Ht 4' 11\" (1.499 m)   Wt 112.5 kg (248 lb 1.6 oz)   SpO2 97%   Breastfeeding No   BMI 50.11 kg/m²       ROS: 12 point ROS obtained in details. Pertinent positives as mentioned in HPI,   otherwise negative    Physical Exam:    Constitutional: Well developed, well-nourished female in no acute distress. HEENT: Normocephalic, Atraumatic   CV:   no edema   Respiratory: No respiratory distress or difficulties breathing   Abdomen:  Soft and nontender.  Female:  minimal left CVA tenderness  Skin:  Normal color. No evidence of jaundice. Neuro/Psych:  Patient with appropriate affect. Alert and oriented.            Labs: Results:   Chemistry Recent Labs     20  0538 20  0106   * 180*    142   K 3.9 4.0    112*   CO2 27 25   BUN 19* 19*   CREA 1.55* 1.32*   CA 8.8 9.1   AGAP 4 5   BUCR 12 14      CBC w/Diff Recent Labs     20  0538 20  0106   WBC 9.7 9.6   RBC 4.60 4.61   HGB 11.9* 12.0   HCT 36.5 36.8    253   GRANS  --  59   LYMPH  --  32   EOS  --  3      Microbiology No results for input(s): CULT in the last 72 hours.        RADIOLOGY:    All available imaging studies/reports in [facility] for this admission were reviewed    Dr. Iban Rangel, Infectious Disease Specialist  [phone]  2020  10:12 AM

## 2020-01-22 NOTE — PROGRESS NOTES
D/C orders received. Scripts for Cipro and  fosfomycin filled by outpatient pharmacy with patient paying copay. Chart reviewed. Pt will be transported home by family .  available as needed.     Janelle Munson RN BSN  Care Manager  [phone]

## 2020-01-22 NOTE — DISCHARGE INSTRUCTIONS
Discharge Instructions    Patient: Linda Seymour MRN: [id number]  CSN: [id number]    YOB: 1955  Age: 75 y.o. Sex: female    DOA: 1/18/2020 LOS:  LOS: 4 days   Discharge Date:      ACUTE DIAGNOSES:  1.  Sepsis, resolved   2. UTI with ESBL E.Coli, complicated in the setting of staghorn calculi       - CT with Mild hydroureteronephrosis bilaterally which may be acute or chronic.      - Close op f/u with Dr. Lora Hitchcock - his office to arrange  3. Hyperglycemia with DM2 - insulin pump ordered  4. Morbid Obesity Body mass index is 48.47 kg/m². 5. Anemia, microcytic, hypochromic - iron low, can have supplement  6. prerenal azotemia with CKD3, creatinine and function toward baseline  7. Fever, resolved   8. Leukocytosis resolved         DISCHARGE MEDICATIONS:   {Medication reconciliation information is now added to the patient's AVS automatically when it is printed. There is no need to use this SmartLink in discharge instructions. Highlight this text and delete it to clear this message}      · It is important that you take the medication exactly as they are prescribed. · Keep your medication in the bottles provided by the pharmacist and keep a list of the medication names, dosages, and times to be taken in your wallet. · Do not take other medications without consulting your doctor. DIET:  {diet:17173}    ACTIVITY: {discharge activity:75694}    ADDITIONAL INFORMATION: If you experience any of the following symptoms then please call your primary care physician or return to the emergency room if you cannot get hold of your doctor: Fever, chills, nausea, vomiting, diarrhea, change in mentation, falling, bleeding, shortness of breath. FOLLOW UP CARE:  Dr. Monroe Lowry, DO  you are to call and set up an appointment to see them in 1-2 weeks.     Follow-up with Dr. Lora Hitchcock, Urology within 1-2 week      Information obtained by :  I understand that if any problems occur once I am at home I am to contact my physician. I understand and acknowledge receipt of the instructions indicated above.                                                                                                                                            Physician's or R.N.'s Signature                                                                  Date/Time                                                                                                                                              Patient or Representative Signature                                                          Date/Time    Lety Tran MD  1/22/2020  11:37 AM

## 2020-01-22 NOTE — PROGRESS NOTES
Problem: Sepsis: Day 4  Goal: Off Pathway (Use only if patient is Off Pathway)  Outcome: Progressing Towards Goal     Problem: Diabetes Self-Management  Goal: *Disease process and treatment process  Description  Define diabetes and identify own type of diabetes; list 3 options for treating diabetes.   Outcome: Progressing Towards Goal

## 2020-01-24 LAB
BACTERIA SPEC CULT: NORMAL
BACTERIA SPEC CULT: NORMAL
SERVICE CMNT-IMP: NORMAL
SERVICE CMNT-IMP: NORMAL

## 2020-02-21 ENCOUNTER — OFFICE VISIT (OUTPATIENT)
Dept: ONCOLOGY | Age: 65
End: 2020-02-21

## 2020-02-21 ENCOUNTER — HOSPITAL ENCOUNTER (OUTPATIENT)
Dept: LAB | Age: 65
Discharge: HOME | End: 2020-02-21
Payer: COMMERCIAL

## 2020-02-21 VITALS
HEIGHT: 59 IN | BODY MASS INDEX: 49.8 KG/M2 | DIASTOLIC BLOOD PRESSURE: 68 MMHG | HEART RATE: 89 BPM | TEMPERATURE: 98.6 F | WEIGHT: 247 LBS | SYSTOLIC BLOOD PRESSURE: 142 MMHG | RESPIRATION RATE: 16 BRPM | OXYGEN SATURATION: 96 %

## 2020-02-21 DIAGNOSIS — E89.89 LYMPHEDEMA OF UPPER EXTREMITY FOLLOWING LYMPHADENECTOMY: ICD-10-CM

## 2020-02-21 DIAGNOSIS — M19.90 ARTHRITIS: ICD-10-CM

## 2020-02-21 DIAGNOSIS — C50.411 MALIGNANT NEOPLASM OF UPPER-OUTER QUADRANT OF RIGHT FEMALE BREAST, UNSPECIFIED ESTROGEN RECEPTOR STATUS (HCC): Primary | ICD-10-CM

## 2020-02-21 DIAGNOSIS — I89.0 LYMPHEDEMA OF UPPER EXTREMITY FOLLOWING LYMPHADENECTOMY: ICD-10-CM

## 2020-02-21 DIAGNOSIS — M62.838 MUSCLE SPASM: ICD-10-CM

## 2020-02-21 LAB
ALBUMIN SERPL-MCNC: 3.6 G/DL (ref 3.4–5)
ALBUMIN/GLOB SERPL: 0.9 {RATIO} (ref 0.8–1.7)
ALP SERPL-CCNC: 91 U/L (ref 45–117)
ALT SERPL-CCNC: 18 U/L (ref 13–56)
ANION GAP SERPL CALC-SCNC: 7 MMOL/L (ref 3–18)
AST SERPL-CCNC: 8 U/L (ref 10–38)
BASOPHILS # BLD: 0 K/UL (ref 0–0.1)
BASOPHILS NFR BLD: 0 % (ref 0–2)
BILIRUB SERPL-MCNC: 0.4 MG/DL (ref 0.2–1)
BUN SERPL-MCNC: 29 MG/DL (ref 7–18)
BUN/CREAT SERPL: 17 (ref 12–20)
CALCIUM SERPL-MCNC: 8.8 MG/DL (ref 8.5–10.1)
CHLORIDE SERPL-SCNC: 109 MMOL/L (ref 100–111)
CO2 SERPL-SCNC: 26 MMOL/L (ref 21–32)
CREAT SERPL-MCNC: 1.73 MG/DL (ref 0.6–1.3)
DIFFERENTIAL METHOD BLD: NORMAL
EOSINOPHIL # BLD: 0.3 K/UL (ref 0–0.4)
EOSINOPHIL NFR BLD: 3 % (ref 0–5)
ERYTHROCYTE [DISTWIDTH] IN BLOOD BY AUTOMATED COUNT: 14.3 % (ref 11.6–14.5)
GLOBULIN SER CALC-MCNC: 4.1 G/DL (ref 2–4)
GLUCOSE SERPL-MCNC: 155 MG/DL (ref 74–99)
HCT VFR BLD AUTO: 40.8 % (ref 35–45)
HGB BLD-MCNC: 13.3 G/DL (ref 12–16)
LYMPHOCYTES # BLD: 3.3 K/UL (ref 0.9–3.6)
LYMPHOCYTES NFR BLD: 38 % (ref 21–52)
MCH RBC QN AUTO: 26 PG (ref 24–34)
MCHC RBC AUTO-ENTMCNC: 32.6 G/DL (ref 31–37)
MCV RBC AUTO: 79.7 FL (ref 74–97)
MONOCYTES # BLD: 0.4 K/UL (ref 0.05–1.2)
MONOCYTES NFR BLD: 5 % (ref 3–10)
NEUTS SEG # BLD: 4.6 K/UL (ref 1.8–8)
NEUTS SEG NFR BLD: 54 % (ref 40–73)
PLATELET # BLD AUTO: 297 K/UL (ref 135–420)
PMV BLD AUTO: 10.3 FL (ref 9.2–11.8)
POTASSIUM SERPL-SCNC: 4.5 MMOL/L (ref 3.5–5.5)
PROT SERPL-MCNC: 7.7 G/DL (ref 6.4–8.2)
RBC # BLD AUTO: 5.12 M/UL (ref 4.2–5.3)
SODIUM SERPL-SCNC: 142 MMOL/L (ref 136–145)
WBC # BLD AUTO: 8.6 K/UL (ref 4.6–13.2)

## 2020-02-21 PROCEDURE — 85025 COMPLETE CBC W/AUTO DIFF WBC: CPT

## 2020-02-21 PROCEDURE — 86300 IMMUNOASSAY TUMOR CA 15-3: CPT

## 2020-02-21 PROCEDURE — 80053 COMPREHEN METABOLIC PANEL: CPT

## 2020-02-21 PROCEDURE — 36415 COLL VENOUS BLD VENIPUNCTURE: CPT

## 2020-02-21 NOTE — PROGRESS NOTES
Hematology/Oncology  Progress Note    Name: Isac Colindres  Date: 2020  : 1955    Ms. Carla Gresham is a 75year old female who was seen for management of her invasive ductal adenocarcinoma right and anemia. Current therapy: Femara 2.5mg PO daily and oral iron supplementation daily. Subjective:     Ms. Carla Gresham is a 42-year-old woman who is currently taking Femara 2.5mg PO daily as long-term management for her invasive ductal carcinoma of the breast.  She states she continues to tolerate the medication well. The patient reports that she is doing her breast self-exam on a monthly basis. Additionally, she continues to take the iron supplement once daily. She reports she is wearing her sleeve and compression garment for her lymphedema. She has no complaints of pain at this time. She denies chest pain or shortness of breath. The patient reports her appetite is good. She does not have any concerns or complaints to report at this time. Past medical history, family history, and social history were reviewed and remain unchanged.     Past Medical History:   Diagnosis Date    Arthritis     Asthma 2018    Resolved, per patient    Breast CA ([facility] [address] 2014    Right Breast  19  resolved with surgery    Controlled diabetes mellitus with chronic kidney disease ([address]     stage 3    GERD (gastroesophageal reflux disease) 2018    Resolved, per patient    Hiatus hernia syndrome     resolved, per pt.  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     HX GI      colonoscopy    HX GYN      urethra reconstruction x3    HX HYSTERECTOMY      HX MODIFIED RADICAL MASTECTOMY  3/21/2014    RIGHT MODIFIED RADICAL MASTECTOMY SENTINAL LYMPH NODE BIOPSY performed by Aiyana Piedra MD at [address] MAIN OR    HX TUBAL LIGATION      HX UROLOGICAL      4 diverticuli repaired    HX UROLOGICAL  03/29/2011    Cysto, percutaneous nephrolithotomy, antegrade URS, antegrade pyelogram.  Dr. Matt Delatorre, [facility].    HX UROLOGICAL  12/11/2009    Cysto, flexible URS, right RPG, lasertripsy, JJ stent placement. Dr. Matt Delatorre, [facility].    HX UROLOGICAL  11/20/2009    Second look right attempted percutaneous nephrolithotomy. Dr. Matt Delatorre, [facility].    HX UROLOGICAL  10/23/2009    Cysto, right percutaneous nephrolithotomy. Dr. Matt Delatorre, [facility].    HX UROLOGICAL  08/07/2018    Cysto, bilat RPG, right URS, HLL, right JJ stent placement, right nephrostomy tube removal, Dr. Lora Hitchcock, [facility]    HX UROLOGICAL  09/04/2018    cysto, right JJ stent removal, bilat RPG. Dr Lora Hitchcock. [address]  HX UROLOGICAL  11/20/2018    cysto, bilat RPG, left URS, laser lithotripsy, left JJ stent place. Dr. Lora Hitchcock. [address]  HX UROLOGICAL  01/25/2019    Left PCNL and removal of left JJ stent. Dr. Lora Hitchcock at [address]     HX VASCULAR ACCESS  2014    IR GUIDE DIL URET TRACT EXIST INCL NEW ACCESS W TUBE PLACMENT LT  1/25/2019     Social History     Socioeconomic History    Marital status: SINGLE     Spouse name: Not on file    Number of children: Not on file    Years of education: Not on file    Highest education level: Not on file   Occupational History    Occupation:      Employer: [employer] [address] [place] [address] [employer] resource strain: Not on file    Food insecurity:     Worry: Not on file     Inability: Not on file    Transportation needs:     Medical: Not on file Non-medical: Not on file   Tobacco Use    Smoking status: Never Smoker    Smokeless tobacco: Never Used   Substance and Sexual Activity    Alcohol use: Never     Frequency: Never     Comment: occasionally    Drug use: No    Sexual activity: Not on file   Lifestyle    Physical activity:     Days per week: Not on file     Minutes per session: Not on file    Stress: Not on file   Relationships    Social connections:     Talks on phone: Not on file     Gets together: Not on file     Attends Hinduism service: Not on file     Active member of club or organization: Not on file     Attends meetings of clubs or organizations: Not on file     Relationship status: Not on file    Intimate partner violence:     Fear of current or ex partner: Not on file     Emotionally abused: Not on file     Physically abused: Not on file     Forced sexual activity: Not on file   Other Topics Concern    Not on file   Social History Narrative    Not on file     Family History   Problem Relation Age of Onset    Breast Cancer Sister 79    Diabetes Mother     Hypertension Mother     Breast Cancer Sister 39    Hypertension Other         Parent    Diabetes Other         parent    Stroke Other         parent    Hypertension Other         sibling    Diabetes Other         sibling    Osteoporosis Other         sibling    Breast Cancer Sister     Diabetes Sister     Diabetes Sister     Diabetes Sister      Current Outpatient Medications   Medication Sig Dispense Refill    trimethoprim-sulfamethoxazole (BACTRIM DS) 160-800 mg per tablet Take 1 Tab by mouth two (2) times a day. Start 7 days prior to scheduled procedure 14 Tab 0    omeprazole (PRILOSEC) 20 mg capsule       ferrous sulfate 325 mg (65 mg iron) tablet Take 1 Tab by mouth daily (after lunch). 30 Tab 0    INTRAROSA 6.5 mg inst Insert 1 Suppository into vagina nightly.  losartan (COZAAR) 50 mg tablet Take 1 Tab by mouth daily.       atorvastatin (LIPITOR) 40 mg tablet       letrozole (FEMARA) 2.5 mg tablet Take 1 Tab by mouth every evening. 90 Tab 3    potassium citrate (UROCIT-K) 15 mEq TbER tablet Take 1 Tab by mouth three (3) times daily (with meals). 180 Tab 6    PROLIA 60 mg/mL injection 60 mg every 6 months.  HUMALOG U-100 INSULIN 100 unit/mL injection 100 Units by SubCUTAneous route daily. Pt has insulin pump      levothyroxine (SYNTHROID) 88 mcg tablet Take 88 mcg by mouth daily.  multivitamin (ONE A DAY) tablet Take 1 Tab by mouth daily. Review of Systems  Constitutional: The patient denies acute distress or discomfort  HEENT: The patient denies recent head trauma, eye pain, blurred vision,  hearing deficit, oropharyngeal mucosal pain or lesions, and the patient denies throat pain or discomfort. Lymphatics: The patient denies palpable peripheral lymphadenopathy. Hematologic: The patient denies having bruising, bleeding, or progressive fatigue. Respiratory: Patient denies having shortness of breath, cough, sputum production, fever, or dyspnea on exertion. Cardiovascular: The patient denies having leg pain, leg swelling, heart palpitations, chest permit, chest pain, or lightheadedness. The patient denies having dyspnea on exertion. Gastrointestinal: The patient denies having nausea, emesis, or diarrhea. The patient denies having any hematemesis or blood in the stool. Genitourinary: Patient denies having urinary urgency, frequency, or dysuria. The patient denies having blood in the urine. Psychological: The patient denies having symptoms of nervousness, anxiety, depression, or thoughts of harming himself some of this. Skin: Patient denies having skin rashes, skin, ulcerations, or unexplained itching or pruritus. Musculoskeletal: The patient has swelling in the right arm due to progressive lymphedema. She is now wearing a lymphedema sleeve.  Anterior chest wall and breasts: She denies muscle or joint aches or pains    Objective: Visit Vitals  /68 (BP 1 Location: Left arm, BP Patient Position: Sitting)   Pulse 89   Temp 98.6 °F (37 °C) (Oral)   Resp 16   Ht 4' 11\" (1.499 m)   Wt 112 kg (247 lb)   SpO2 96%   BMI 49.89 kg/m²     ECOG PS=0 Pain score 0/10     Physical Exam:   Gen. Appearance: The patient is in no acute distress. Skin: There is no bruise or rash. HEENT: The exam is unremarkable. Neck: Supple without lymphadenopathy or thyromegaly. Lungs: Clear to auscultation and percussion; there are no wheezes or rhonchi. Heart: Regular rate and rhythm; there are no murmurs, gallops, or rubs. Anterior chest wall. Breasts: There is no evidence of local recurrence of disease. The axilla reveals no palpable axillary lymphadenopathy. Abdomen: Bowel sounds are present and normal.  There is no guarding, tenderness, or hepatosplenomegaly. Extremities: There is no clubbing, cyanosis. 2+ edema to RUE, patient is wearing a compression sleeve. Neurologic: There are no focal neurologic deficits. Lymphatics: There is no palpable peripheral lymphadenopathy. Musculoskeletal: The patient has full range of motion at all joints. There is no evidence of joint deformity or effusions. The patient has  right arm lymphedema extending from the fingers up to the shoulder area on the right. Psychological/psychiatric: There is no clinical evidence of anxiety, depression, or melancholy.     Lab data:      Results for orders placed or performed during the hospital encounter of 06/07/19   CBC WITH 3 PART DIFF     Status: None   Result Value Ref Range Status    WBC 10.6 4.5 - 13.0 K/uL Final    RBC 4.88 4.10 - 5.10 M/uL Final    HGB 12.6 12.0 - 16.0 g/dL Final    HCT 39.6 36 - 48 % Final    MCV 81.1 78 - 102 FL Final    MCH 25.8 25.0 - 35.0 PG Final    MCHC 31.8 31 - 37 g/dL Final    RDW 13.0 11.5 - 14.5 % Final    PLATELET 733 341 - 829 K/uL Final    NEUTROPHILS 66 40 - 70 % Final    MIXED CELLS 6 0.1 - 17 % Final    LYMPHOCYTES 28 14 - 44 % Final ABS. NEUTROPHILS 6.9 1.8 - 9.5 K/UL Final    ABS. MIXED CELLS 0.7 0.0 - 2.3 K/uL Final    ABS. LYMPHOCYTES 3.0 1.1 - 5.9 K/UL Final     Comment: Test performed at [address] [place] [address] or Outpatient Infusion Center Location. Reviewed by Medical Director. DF AUTOMATED   Final         Assessment:     1. Malignant neoplasm of upper-outer quadrant of right female breast, unspecified estrogen receptor status ([facility] [address]    2. Arthritis    3. Lymphedema of upper extremity following lymphadenectomy    4. Muscle spasm        Plan:   Breast cancer: I have encouraged the patient to continue with her monthly breast self-examinations. At this time I will order a CA 27-29 level, and a comprehensive metabolic panel. The most recent CA 27-29 level was 31.3U/mL from 06/2019. The most recent mammogram in Nov 2019 was negative for malignancy. Arthritis: She was advised to discontinue  Motrin due to fluid retention. She was advised to start using Tylenol arthritis instead for moderate pain and she was instructed to use Percocet for severe pain. Right arm and hand lymphedema:She continues to wear her sleeve, glove, and chest garment. I advised her to to avoid lifting more than 10 pounds using the right arm and hand. She reports that the lymphedema has significantly improved since her last clinic visit. Muscle Spasms: She is currently using Flexeril 5mg with instructions to take 1 tab TID as needed. She did not request for a new Rx today. I will have the patient return to the clinic again in 4 months or sooner if indicated.     Orders Placed This Encounter    CBC WITH AUTOMATED DIFF     Standing Status:   Future     Number of Occurrences:   1     Standing Expiration Date:   2/21/2021    CA 27.29     Standing Status:   Future     Number of Occurrences:   1     Standing Expiration Date:   3/79/5389    METABOLIC PANEL, COMPREHENSIVE     Standing Status:   Future     Number of Occurrences:   1 Standing Expiration Date:   2/21/2021         Priscilla Alcazar NP  2/21/2020     I have assessed the patient independently and  agree with the full assessment as outlined.   Janice Sen MD, [place]

## 2020-02-21 NOTE — PROGRESS NOTES
Identified pt with two pt identifiers(name and ). Reviewed record in preparation for visit and have obtained necessary documentation. Chief Complaint   Patient presents with    Follow-up     4 month        Health Maintenance Due   Topic    Hepatitis C Screening     Foot Exam Q1     MICROALBUMIN Q1     Eye Exam Retinal or Dilated     DTaP/Tdap/Td series (1 - Tdap)    Shingrix Vaccine Age 50> (1 of 2)    FOBT Q1Y Age 54-65     A1C test (Diabetic or Prediabetic)     Lipid Screen     GLAUCOMA SCREENING Q2Y     Bone Densitometry (Dexa) Screening     Pneumococcal 65+ years (1 of 1 - PPSV23)    Influenza Age 5 to Adult        Coordination of Care Questionnaire:  :   1) Have you been to an emergency room, urgent care, or hospitalized since your last visit? If yes, where when, and reason for visit? YES - @ [facility]      2. Have seen or consulted any other health care provider since your last visit? If yes, where when, and reason for visit? NO      3) Do you have an Advanced Directive/ Living Will in place? NO  If yes, do we have a copy on file NO  If no, would you like information NO      Learning Assessment 2018   PRIMARY LEARNER Patient   PRIMARY LANGUAGE ENGLISH   LEARNER PREFERENCE PRIMARY LISTENING   ANSWERED BY patient   RELATIONSHIP SELF        3 most recent PHQ Screens 2020   Little interest or pleasure in doing things Not at all   Feeling down, depressed, irritable, or hopeless Not at all   Total Score PHQ 2 0        Abuse Screening Questionnaire 10/9/2018   Do you ever feel afraid of your partner? N   Are you in a relationship with someone who physically or mentally threatens you? N   Is it safe for you to go home? Y        Fall Risk Assessment, last 12 mths 2020   Able to walk? Yes   Fall in past 12 months?  No

## 2020-02-21 NOTE — PATIENT INSTRUCTIONS
Complete Blood Count (CBC): About This Test  What is it? A complete blood count (CBC) is a blood test that gives important information about your blood cells, especially red blood cells, white blood cells, and platelets. Why is this test done? A CBC may be done as part of a regular physical exam. There are many other reasons that a doctor may want this blood test, including to:  · Find the cause of symptoms such as fatigue, weakness, fever, bruising, or weight loss. · Find anemia or an infection. · See how much blood has been lost if there is bleeding. · Diagnose diseases of the blood, such as leukemia or polycythemia. How can you prepare for the test?  You do not need to do anything before having this test.  What happens during the test?  The health professional taking a sample of your blood will:  · Wrap an elastic band around your upper arm. This makes the veins below the band larger so it is easier to put a needle into the vein. · Clean the needle site with alcohol. · Put the needle into the vein. · Attach a tube to the needle to fill it with blood. · Remove the band from your arm when enough blood is collected. · Put a gauze pad or cotton ball over the needle site as the needle is removed. · Put pressure on the site and then put on a bandage. If this blood test is done on a baby, a heel stick may be done instead of a blood draw from a vein. What happens after the test?  · You will probably be able to go home right away. · You can go back to your usual activities right away. Follow-up care is a key part of your treatment and safety. Be sure to make and go to all appointments, and call your doctor if you are having problems. It's also a good idea to keep a list of the medicines you take. Ask your doctor when you can expect to have your test results. Where can you learn more? Go to http://alexsandra-rachel.info/.   Enter R137 in the search box to learn more about \"Complete Blood Count (CBC): About This Test.\"  Current as of: March 28, 2019  Content Version: 12.2  © 3021-0651 [employer]. Care instructions adapted under license by [employer] (which disclaims liability or warranty for this information). If you have questions about a medical condition or this instruction, always ask your healthcare professional. [address] any warranty or liability for your use of this information.

## 2020-02-23 LAB — CANCER AG27-29 SERPL-ACNC: 32.1 U/ML (ref 0–38.6)

## 2020-05-29 ENCOUNTER — VIRTUAL VISIT (OUTPATIENT)
Dept: ONCOLOGY | Age: 65
End: 2020-05-29

## 2020-05-29 VITALS — WEIGHT: 244 LBS | BODY MASS INDEX: 49.19 KG/M2 | HEIGHT: 59 IN

## 2020-05-29 DIAGNOSIS — E89.89 LYMPHEDEMA OF UPPER EXTREMITY FOLLOWING LYMPHADENECTOMY: ICD-10-CM

## 2020-05-29 DIAGNOSIS — C50.411 MALIGNANT NEOPLASM OF UPPER-OUTER QUADRANT OF RIGHT FEMALE BREAST, UNSPECIFIED ESTROGEN RECEPTOR STATUS (HCC): Primary | ICD-10-CM

## 2020-05-29 DIAGNOSIS — I89.0 LYMPHEDEMA OF UPPER EXTREMITY FOLLOWING LYMPHADENECTOMY: ICD-10-CM

## 2020-05-29 RX ORDER — LETROZOLE 2.5 MG/1
2.5 TABLET, FILM COATED ORAL EVERY EVENING
Qty: 90 TAB | Refills: 3 | Status: SHIPPED | OUTPATIENT
Start: 2020-05-29 | End: 2020-10-20 | Stop reason: SDUPTHER

## 2020-05-29 RX ORDER — OXYCODONE AND ACETAMINOPHEN 5; 325 MG/1; MG/1
1 TABLET ORAL
Qty: 30 TAB | Refills: 0 | Status: SHIPPED | OUTPATIENT
Start: 2020-05-29 | End: 2020-06-03

## 2020-05-29 NOTE — PROGRESS NOTES
Bk Grimes is a 77 y.o. female evaluated via audio only technology on 5/29/2020. Consent: She and/or her health care decision maker is aware that she may receive a bill for this audio only encounter, depending on her insurance coverage, and has provided verbal consent to proceed: Yes    Attending: Dr. Diandra George:   1. Malignant neoplasm of upper-outer quadrant of right female breast, unspecified estrogen receptor status ([facility] [address]  *Letrozole started on 08/08/2014  *Continue Letrozole 2.5mg PO daily for full 10 years  *Continue monthly breast self exam  *02/21/2020: CA27-29 was 32.1U/mL  *11/07/2019 Left Mammogram: No evidence of malignancy. Routine follow-up was recommended  *Follow up in 4 months or sooner if indicated  *Labs prior to next appointment    - CBC WITH 3 PART DIFF; Future   - METABOLIC PANEL, COMPREHENSIVE; Future   - CA 27.29; Future    2. Lymphedema of upper extremity following lymphadenectomy  *She continues to wear her sleeve, glove, and chest garment. *I advised her to to avoid lifting more than 10 pounds using the right arm and hand. *She reports that the lymphedema has significantly improved since her last clinic visit. 12  Subjective: Bk Grimes is a 77 y.o. female who was evaluated via audio only technology. I communicated with the patient and/or health care decision maker about the ongoing management of her Malignant neoplasm of upper-outer quadrant of right female breast and lymphedema of upper extremity following lymphadenectomy. She reports she is currently taking Femara 2.5mg PO daily and tolerating it well. She also reports performing Breast self exam on a regular basis. I informed the patient that at her last clinic visit on 02/21/2020 her CBC showed a WBC count of 8.6K/uL, hemoglobin was 13.3g/dL, hematocrit 40.8%, and the platelet count was 366,121. Her CA 27-29 was normal at 32.1U/mL.  Her kidney function showed a BUN of 29mg/dL and a creatinine of 1.73mg/dL. She states she is being followed by her Urologist on a regular basis. She denies shortness of breath, weakness, and fatigue. She denies fevers, infections, and weight loss. She denies pain or any discomfort. She denies finding new lumps or masses. She has no new complaints or concerns to report. We will schedule her lab tests 1 week prior to her next follow up appointment. The patient had her questions answered to her satisfaction. Follow up in 4 months or sooner if indicated. Prior to Admission medications    Medication Sig Start Date End Date Taking? Authorizing Provider   cefdinir (OMNICEF) 300 mg capsule  4/3/20   Provider, Historical   ibuprofen (MOTRIN) 600 mg tablet  5/4/20   Provider, Historical   omeprazole (PRILOSEC) 20 mg capsule  1/27/20   Provider, Historical   ferrous sulfate 325 mg (65 mg iron) tablet Take 1 Tab by mouth daily (after lunch). 1/22/20   Morris Randall MD   INTRAROSA 6.5 mg inst Insert 1 Suppository into vagina nightly. 1/15/20   Provider, Historical   losartan (COZAAR) 50 mg tablet Take 1 Tab by mouth daily. 10/31/19   Provider, Historical   atorvastatin (LIPITOR) 40 mg tablet  9/16/19   Provider, Historical   letrozole (FEMARA) 2.5 mg tablet Take 1 Tab by mouth every evening. 10/11/19   Jassi Perez, ROSCOE   potassium citrate (UROCIT-K) 15 mEq TbER tablet Take 1 Tab by mouth three (3) times daily (with meals). 5/7/19   Emperatriz Farr MD   PROLIA 60 mg/mL injection 60 mg every 6 months. 9/26/18   Provider, Historical   HUMALOG U-100 INSULIN 100 unit/mL injection 100 Units by SubCUTAneous route daily. Pt has insulin pump 8/4/18   Provider, Historical   levothyroxine (SYNTHROID) 88 mcg tablet Take 88 mcg by mouth daily. 5/8/18   Provider, Historical   multivitamin (ONE A DAY) tablet Take 1 Tab by mouth daily.     Provider, Historical     Allergies   Allergen Reactions    Nsaids (Non-Steroidal Anti-Inflammatory Drug) Other (comments)     Patient has chronic kidney disease stage III    Ampicillin Hives       Lab Results   Component Value Date/Time    WBC 8.6 02/21/2020 03:13 PM    Hemoglobin, POC 11.9 (L) 11/26/2012 04:42 PM    HGB (POC) 12.6 04/11/2014 12:59 PM    HGB 13.3 02/21/2020 03:13 PM    Hematocrit, POC 35 (L) 11/26/2012 04:42 PM    HCT (POC) 39.6 04/11/2014 12:59 PM    HCT 40.8 02/21/2020 03:13 PM    PLATELET 239 38/58/4238 03:13 PM    MCV 79.7 02/21/2020 03:13 PM     Lab Results   Component Value Date/Time    Sodium 142 02/21/2020 03:13 PM    Potassium 4.5 02/21/2020 03:13 PM    Chloride 109 02/21/2020 03:13 PM    CO2 26 02/21/2020 03:13 PM    Anion gap 7 02/21/2020 03:13 PM    Glucose 155 (H) 02/21/2020 03:13 PM    BUN 29 (H) 02/21/2020 03:13 PM    Creatinine 1.73 (H) 02/21/2020 03:13 PM    BUN/Creatinine ratio 17 02/21/2020 03:13 PM    GFR est AA 36 (L) 02/21/2020 03:13 PM    GFR est non-AA 29 (L) 02/21/2020 03:13 PM    Calcium 8.8 02/21/2020 03:13 PM    Bilirubin, total 0.4 02/21/2020 03:13 PM    Alk. phosphatase 91 02/21/2020 03:13 PM    Protein, total 7.7 02/21/2020 03:13 PM    Albumin 3.6 02/21/2020 03:13 PM    Globulin 4.1 (H) 02/21/2020 03:13 PM    A-G Ratio 0.9 02/21/2020 03:13 PM    ALT (SGPT) 18 02/21/2020 03:13 PM     Lab Results   Component Value Date/Time    Iron 40 (L) 01/21/2020 01:06 AM    TIBC 247 (L) 01/21/2020 01:06 AM    Iron % saturation 16 01/21/2020 01:06 AM    Ferritin 84 01/21/2020 01:06 AM     Oncologic History:    *Diagnosed with a stage IIB invasive ductal carcinoma of the right breast in 2014  *She subsequently underwent a partial mastectomy on 2/14/2014. *This revealed a 5 cm lesion, which was determined to be hormone receptor positive and HER-2 negative. *3/7/2014: CT scan of the chest, abdomen and pelvis- did not reveal any definitive evidence of metastatic disease. *3/7/2014 a bone scan was done and revealed no scintigraphic evidence of bone metastasis.   *3/18/2014 an MRI of the breast was completed and  revealed residual tumor measuring up to 4.9 cm in the right breast with enlarged axillary lymph nodes. There was no evidence of malignancy in the left breast.    *3/21/2014 The patient underwent a completion mastectomy. This revealed a biopsy cavity with foci of residual poorly differentiated infiltrating ductal adenocarcinoma. She had a total of 3 regional lymph nodes which were positive for adenocarcinoma from the axilla,, and there was one positive right sentinel lymph node, for a total of 4/12 lymph nodes with metastatic ductal adenocarcinoma. The tumor was pathologically staged as a T3, PN2, MX malignancy. *08/08/2014: 4 Cycles of the Cytoxan and Taxotere regimen were completed. Started on Letrozole 2.5mg PO daily  *08/21/2014: CT simulation and daily multi-field radiation therapy to the chest wall and regional lymph nodes for 6 weeks. I affirm this is a Patient-Initiated Episode with a Patient who has not had a related appointment within my department in the past 7 days or scheduled within the next 24 hours. Total Time: minutes: 21-30 minutes  Follow up with Dr. Kaye Bahena in 4 months or sooner if indicated  Labs 1 week prior to appointment    Orders Placed This Encounter    CBC WITH 3 PART DIFF     Standing Status:   Future     Standing Expiration Date:   05/35/6405    METABOLIC PANEL, COMPREHENSIVE     Standing Status:   Future     Standing Expiration Date:   5/30/2021    CA 27.29     Standing Status:   Future     Standing Expiration Date:   5/30/2021    letrozole [facility]) 2.5 mg tablet     Sig: Take 1 Tab by mouth every evening. Dispense:  90 Tab     Refill:  3    oxyCODONE-acetaminophen (Percocet) 5-325 mg per tablet     Sig: Take 1 Tab by mouth every four (4) hours as needed for Pain for up to 5 days. Max Daily Amount: 6 Tabs.  Indications: pain     Dispense:  30 Tab     Refill:  [address], [facility]  05/29/2020

## 2020-10-05 ENCOUNTER — HOSPITAL ENCOUNTER (OUTPATIENT)
Dept: LAB | Age: 65
Discharge: HOME | End: 2020-10-05
Payer: COMMERCIAL

## 2020-10-05 ENCOUNTER — APPOINTMENT (OUTPATIENT)
Dept: ONCOLOGY | Age: 65
End: 2020-10-05

## 2020-10-05 DIAGNOSIS — C50.411 MALIGNANT NEOPLASM OF UPPER-OUTER QUADRANT OF RIGHT FEMALE BREAST, UNSPECIFIED ESTROGEN RECEPTOR STATUS (HCC): ICD-10-CM

## 2020-10-05 LAB
ALBUMIN SERPL-MCNC: 3.1 G/DL (ref 3.4–5)
ALBUMIN/GLOB SERPL: 0.6 {RATIO} (ref 0.8–1.7)
ALP SERPL-CCNC: 100 U/L (ref 45–117)
ALT SERPL-CCNC: 23 U/L (ref 13–56)
ANION GAP SERPL CALC-SCNC: 6 MMOL/L (ref 3–18)
AST SERPL-CCNC: ABNORMAL U/L (ref 10–38)
BILIRUB SERPL-MCNC: 0.9 MG/DL (ref 0.2–1)
BUN SERPL-MCNC: 22 MG/DL (ref 7–18)
BUN/CREAT SERPL: 12 (ref 12–20)
CALCIUM SERPL-MCNC: 8.6 MG/DL (ref 8.5–10.1)
CHLORIDE SERPL-SCNC: 110 MMOL/L (ref 100–111)
CO2 SERPL-SCNC: 22 MMOL/L (ref 21–32)
CREAT SERPL-MCNC: 1.83 MG/DL (ref 0.6–1.3)
GLOBULIN SER CALC-MCNC: 5.3 G/DL (ref 2–4)
GLUCOSE SERPL-MCNC: 116 MG/DL (ref 74–99)
POTASSIUM SERPL-SCNC: ABNORMAL MMOL/L (ref 3.5–5.5)
PROT SERPL-MCNC: 8.4 G/DL (ref 6.4–8.2)
SODIUM SERPL-SCNC: 138 MMOL/L (ref 136–145)

## 2020-10-05 PROCEDURE — 86300 IMMUNOASSAY TUMOR CA 15-3: CPT

## 2020-10-05 PROCEDURE — 85025 COMPLETE CBC W/AUTO DIFF WBC: CPT

## 2020-10-05 PROCEDURE — 80053 COMPREHEN METABOLIC PANEL: CPT

## 2020-10-05 PROCEDURE — 36415 COLL VENOUS BLD VENIPUNCTURE: CPT

## 2020-10-06 DIAGNOSIS — I65.23 BILATERAL CAROTID ARTERY STENOSIS: Primary | ICD-10-CM

## 2020-10-06 DIAGNOSIS — I65.23 BILATERAL CAROTID ARTERY STENOSIS: ICD-10-CM

## 2020-10-06 NOTE — PROGRESS NOTES
Order for ultrasound for bilateral carotid artery stenosis for upcoming appointment placed per verbal order BRIDGET [place].

## 2020-10-07 LAB — CANCER AG27-29 SERPL-ACNC: 33.7 U/ML (ref 0–38.6)

## 2020-10-12 LAB
BASOPHILS # BLD: 0 K/UL (ref 0–0.06)
BASOPHILS NFR BLD: 0 % (ref 0–3)
DIFFERENTIAL METHOD BLD: ABNORMAL
DIFFERENTIAL METHOD BLD: ABNORMAL
EOSINOPHIL # BLD: 0.5 K/UL (ref 0–0.4)
EOSINOPHIL NFR BLD: 4 % (ref 0–5)
ERYTHROCYTE [DISTWIDTH] IN BLOOD BY AUTOMATED COUNT: 14.7 % (ref 11.6–14.5)
ERYTHROCYTE [DISTWIDTH] IN BLOOD BY AUTOMATED COUNT: 14.7 % (ref 11.6–14.5)
HCT VFR BLD AUTO: 36.7 % (ref 35–45)
HCT VFR BLD AUTO: 36.7 % (ref 35–45)
HGB BLD-MCNC: 12 G/DL (ref 12–16)
HGB BLD-MCNC: 12 G/DL (ref 12–16)
LYMPHOCYTES # BLD: 2.5 K/UL (ref 0.8–3.5)
LYMPHOCYTES NFR BLD: 21 % (ref 20–51)
MCH RBC QN AUTO: 26.5 PG (ref 24–34)
MCH RBC QN AUTO: 26.5 PG (ref 24–34)
MCHC RBC AUTO-ENTMCNC: 32.7 G/DL (ref 31–37)
MCHC RBC AUTO-ENTMCNC: 32.7 G/DL (ref 31–37)
MCV RBC AUTO: 81.2 FL (ref 74–97)
MCV RBC AUTO: 81.2 FL (ref 74–97)
MONOCYTES # BLD: 1 K/UL (ref 0–1)
MONOCYTES NFR BLD: 8 % (ref 2–9)
NEUTS SEG # BLD: 7.9 K/UL (ref 1.8–8)
NEUTS SEG NFR BLD: 67 % (ref 42–75)
PLATELET # BLD AUTO: 414 K/UL (ref 135–420)
PLATELET # BLD AUTO: 414 K/UL (ref 135–420)
PMV BLD AUTO: 10.1 FL (ref 9.2–11.8)
RBC # BLD AUTO: 4.52 M/UL (ref 4.2–5.3)
RBC # BLD AUTO: 4.52 M/UL (ref 4.2–5.3)
WBC # BLD AUTO: 11.9 K/UL (ref 4.6–13.2)
WBC # BLD AUTO: 11.9 K/UL (ref 4.6–13.2)

## 2020-10-15 LAB
LEFT CCA DIST DIAS: 19 CM/S
LEFT CCA DIST SYS: 90 CM/S
LEFT CCA PROX DIAS: 25 CM/S
LEFT CCA PROX SYS: 102 CM/S
LEFT ECA DIAS: 11 CM/S
LEFT ECA SYS: 58 CM/S
LEFT ICA DIST DIAS: 28 CM/S
LEFT ICA DIST SYS: 70 CM/S
LEFT ICA MID DIAS: 18 CM/S
LEFT ICA MID SYS: 55 CM/S
LEFT ICA PROX DIAS: 15 CM/S
LEFT ICA PROX SYS: 57 CM/S
LEFT ICA/CCA SYS: 0.78
LEFT SUBCLAVIAN SYS: 121 CM/S
LEFT VERTEBRAL DIAS: 18.7 CM/S
LEFT VERTEBRAL SYS: 56.7 CM/S
RIGHT BULB EDV: 19 CM/S
RIGHT BULB PSV: 55 CM/S
RIGHT CCA DIST DIAS: 18 CM/S
RIGHT CCA DIST SYS: 64 CM/S
RIGHT CCA PROX DIAS: 18 CM/S
RIGHT CCA PROX SYS: 85 CM/S
RIGHT ECA DIAS: 9 CM/S
RIGHT ECA SYS: 76 CM/S
RIGHT ICA DIST DIAS: 16 CM/S
RIGHT ICA DIST SYS: 46 CM/S
RIGHT ICA MID DIAS: 12 CM/S
RIGHT ICA MID SYS: 41 CM/S
RIGHT ICA PROX DIAS: 17 CM/S
RIGHT ICA PROX SYS: 60 CM/S
RIGHT ICA/CCA SYS: 0.9
RIGHT SUBCLAVIAN SYS: 217 CM/S
RIGHT VERTEBRAL SYS: 0 CM/S

## 2020-10-19 NOTE — PROGRESS NOTES
Hematology/Oncology  Progress Note    Name: Gato You  Date: 10/20/2020  : 1955    Ms. Carlos Hurtado is a 77year old female who was seen for management of her invasive ductal adenocarcinoma right and anemia. Current therapy: Femara 2.5mg PO daily and oral iron supplementation daily. Subjective:     Ms. Carlos Hurtado is a 77year-old woman who is currently taking Femara 2.5mg PO daily as long-term management for her invasive ductal carcinoma of the breast. Patient was being followed by Dr. Penelope Roberts who retired recently. She states she continues to tolerate the medication well. She has been taking percocet PRN for chronic low back pain and asked for refill today. The patient reports that she is doing her breast self-exam on a monthly basis. Additionally, she continues to take the iron supplement once daily. She reports she is wearing her sleeve and compression garment for her lymphedema. The patient otherwise has no other complaints. Denied fever, chills, night sweat, unintentional weight loss, skin lumps or bumps, acute bleeding or bruising issues. No acute bleeding, blood in stool, dark stool, melena, hematochezia, hemoptysis, dark urine, or easily bruising. Denied headache, acute vision change, dizziness, chest pain, worsen shortness of breath, palpitation, productive cough, nausea, vomiting, abdominal pain, altered bowel habits, dysuria, new bone pain or back pain, focal numbness or weakness. Past medical history, family history, and social history were reviewed and remain unchanged.     Past Medical History:   Diagnosis Date    Arthritis     Asthma 2018    Resolved, per patient    Breast CA ([facility] 2014    Right Breast  19  resolved with surgery    Controlled diabetes mellitus with chronic kidney disease ([facility] [address] [facility]     stage 3    GERD (gastroesophageal reflux disease) 2018    Resolved, per patient    Hiatus hernia syndrome     resolved, per pt.  8-6-18    Hypertension     Insulin pump in place     Microhematuria     Morbid obesity ([address] 11/16/2018    BMI = 47.4    Osteopenia     Postmastectomy lymphedema syndrome of right upper extremity 1/18/2020    Recurrent UTI     Staghorn calculus     Struvite kidney stones     Thyroid dysfunction     GOITER    Urethral diverticulum     Urine leukocytes      Past Surgical History:   Procedure Laterality Date    HX BREAST BIOPSY  10/18/10    stereotactic - right breast calcifications    HX BREAST BIOPSY  2/14/2014    RIGHT BREAST BIOPSY performed by Xiomara Warner MD at [address] [place] [address] HX CYST REMOVAL  1997    Bilateral breast.     HX GI      colonoscopy    HX GYN      urethra reconstruction x3    HX HYSTERECTOMY      HX MODIFIED RADICAL MASTECTOMY  3/21/2014    RIGHT MODIFIED RADICAL MASTECTOMY SENTINAL LYMPH NODE BIOPSY performed by Xiomara Warner MD at [facility] MAIN OR    HX TUBAL LIGATION      HX UROLOGICAL      4 diverticuli repaired    HX UROLOGICAL  03/29/2011    Cysto, percutaneous nephrolithotomy, antegrade URS, antegrade pyelogram.  Dr. Vesta Ballard, [facility].    HX UROLOGICAL  12/11/2009    Cysto, flexible URS, right RPG, lasertripsy, JJ stent placement. Dr. Vesta Ballard, [facility].     UROLOGICAL  11/20/2009    Second look right attempted percutaneous nephrolithotomy. Dr. Vesta Ballard, [facility].     UROLOGICAL  10/23/2009    Cysto, right percutaneous nephrolithotomy. Dr. Vesta Ballard, [facility].    HX UROLOGICAL  08/07/2018    Cysto, bilat RPG, right URS, HLL, right JJ stent placement, right nephrostomy tube removal, Dr. Andre Mathews, [address] [place] [address]    HX UROLOGICAL  09/04/2018    cysto, right JJ stent removal, bilat RPG. Dr Andre Mathews. [address].  HX UROLOGICAL  11/20/2018    cysto, bilat RPG, left URS, laser lithotripsy, left JJ stent place. Dr. Andre Mathews. [address].   UROLOGICAL  01/25/2019    Left PCNL and removal of left JJ stent. Dr. Andre Mathews at [address].   UROLOGICAL  07/31/2020    Cystoscopy, ureteroscopy, fluoroscopy, retrograde pyelogram, and exchange of a double-J stent 6 x 24.      HX UROLOGICAL  09/25/2020    Cystoscopy, ureteroscopy, nephrostomy tube removal, stent placement.      HX UROLOGICAL  09/23/2020    S/p Left PCNL    HX VASCULAR ACCESS  2014    IR GUIDE DIL URET TRACT EXIST INCL NEW ACCESS W TUBE PLACMENT LT  1/25/2019     Social History     Socioeconomic History    Marital status: SINGLE     Spouse name: Not on file    Number of children: Not on file    Years of education: Not on file    Highest education level: Not on file   Occupational History    Occupation:      Employer: [employer]  resource strain: Not on file    Food insecurity     Worry: Not on file     Inability: Not on file   [employer] needs     Medical: Not on file     Non-medical: Not on file   Tobacco Use    Smoking status: Never Smoker    Smokeless tobacco: Never Used   Substance and Sexual Activity    Alcohol use: Never     Frequency: Never     Comment: occasionally    Drug use: No    Sexual activity: Not on file   Lifestyle    Physical activity     Days per week: Not on file     Minutes per session: Not on file    Stress: Not on file   Relationships    Social connections     Talks on phone: Not on file     Gets together: Not on file     Attends [employer] service: Not on file     Active member of club or organization: Not on file     Attends meetings of clubs or organizations: Not on file     Relationship status: Not on file    Intimate partner violence     Fear of current or ex partner: Not on file     Emotionally abused: Not on file     Physically abused: Not on file     Forced sexual activity: Not on file   Other Topics Concern    Not on file   Social History Narrative    Not on file     Family History   Problem Relation Age of Onset    Breast Cancer Sister 79    Diabetes Mother     Hypertension Mother     Breast Cancer Sister 39    Hypertension Other         Parent    Diabetes Other         parent    Stroke Other parent    Hypertension Other         sibling    Diabetes Other         sibling    Osteoporosis Other         sibling    Breast Cancer Sister     Diabetes Sister     Diabetes Sister     Diabetes Sister      Current Outpatient Medications   Medication Sig Dispense Refill    oxyCODONE-acetaminophen (PERCOCET) 5-325 mg per tablet Take 1 Tab by mouth every six (6) hours as needed.  letrozole (FEMARA) 2.5 mg tablet Take 1 Tab by mouth every evening. 90 Tab 3    cefdinir (OMNICEF) 300 mg capsule       ibuprofen (MOTRIN) 600 mg tablet       omeprazole (PRILOSEC) 20 mg capsule       ferrous sulfate 325 mg (65 mg iron) tablet Take 1 Tab by mouth daily (after lunch). 30 Tab 0    INTRAROSA 6.5 mg inst Insert 1 Suppository into vagina nightly.  losartan (COZAAR) 50 mg tablet Take 1 Tab by mouth daily.  atorvastatin (LIPITOR) 40 mg tablet       PROLIA 60 mg/mL injection 60 mg every 6 months.  HUMALOG U-100 INSULIN 100 unit/mL injection 100 Units by SubCUTAneous route daily. Pt has insulin pump      levothyroxine (SYNTHROID) 88 mcg tablet Take 88 mcg by mouth daily.  multivitamin (ONE A DAY) tablet Take 1 Tab by mouth daily. Review of Systems   Constitutional: Negative for chills, diaphoresis, fever, malaise/fatigue and weight loss. Respiratory: Negative for cough, hemoptysis, shortness of breath and wheezing. Cardiovascular: Negative for chest pain, palpitations and leg swelling. Gastrointestinal: Negative for abdominal pain, diarrhea, heartburn, nausea and vomiting. Genitourinary: Negative for dysuria, frequency, hematuria and urgency. Musculoskeletal: Negative for joint pain and myalgias. Skin: Negative for itching and rash. Neurological: Negative for dizziness, seizures, weakness and headaches. Psychiatric/Behavioral: Negative for depression. The patient does not have insomnia.              Objective:     Visit Vitals  BP (!) 150/84 (BP Patient Position: Sitting) Pulse 99   Temp 98.9 °F (37.2 °C) (Oral)   Resp 18   Wt 109.2 kg (240 lb 12.8 oz)   SpO2 100%   BMI 48.64 kg/m²       ECOG Performance Status (grade): 1  0 - able to carry on all pre-disease activity w/out restriction  1 - restricted but able to carry out light work  2 - ambulatory and can self- care but unable to carry out work  3 - bed or chair >50% of waking hours  4 - completely disable, total care, confined to bed or chair    Physical Exam  Constitutional:       Appearance: Normal appearance. She is obese. HENT:      Head: Normocephalic and atraumatic. Eyes:      Pupils: Pupils are equal, round, and reactive to light. Neck:      Musculoskeletal: Neck supple. Cardiovascular:      Rate and Rhythm: Normal rate and regular rhythm. Heart sounds: Normal heart sounds. Pulmonary:      Effort: Pulmonary effort is normal.      Breath sounds: Normal breath sounds. Abdominal:      General: Bowel sounds are normal.      Palpations: Abdomen is soft. Tenderness: There is no abdominal tenderness. There is no guarding. Musculoskeletal: Normal range of motion. Right lower leg: No edema. Left lower leg: No edema. Skin:     General: Skin is warm. Neurological:      General: No focal deficit present. Mental Status: She is alert and oriented to person, place, and time. Mental status is at baseline. Diagnostics:      No results found for this or any previous visit (from the past 96 hour(s)). Imaging:  Results for orders placed during the hospital encounter of 01/25/19   IR GUIDE DIL URET TRACT EXIST INCL NEW ACCESS W TUBE PLACMENT LT    Narrative Indication: Renal calculi. Left staghorn calculi. Impression IMPRESSION: Successful left antegrade pyelogram, left percutaneous nephrostomy,  tract dilatation and ureteral stent placement. The  film shows multiple left renal calculi one of which is a staghorn. A  double-J left ureteral stent catheter is in place.     Using aseptic technique under fluoroscopic control a left antegrade pyelogram  was performed with a Chiba needle. This demonstrates small tubal large calculi  in the lower pole collecting system with at least one or perhaps 2 Staghorn  calculi. There is free flow down the ureter through the double-J ureteral stent  into the bladder. Catheter and guidewire were techniques were used to select a lower pole stone  filled calyx, enter the collecting system, work into the ureter, dilated the  tract to [address] [place] and place a 7 [place] Ansell percutaneous ureteral stent  catheter into the distal ureter. This was secured in place and capped to  internal drainage. There were no complications and the patient left the  radiology department in satisfactory condition. The radiology nurse provided moderate intravenous conscious sedation with  fentanyl and Versed. Start time was 1049 and end time 1133.    10.6 minutes of fluoroscopy time was utilized. The DAP equals 24.5280 kenneth per  centimeter square. Results for orders placed during the hospital encounter of 20   XR CHEST PORT    Narrative EXAM: CHEST ONE VIEW  portable 1749 hours    CLINICAL HISTORY/INDICATION: Sepsis    COMPARISON: Chest x-ray 2014. TECHNIQUE: One view obtained. FINDINGS:     The cardiac and mediastinal silhouette is normal. The lungs are clear. Pulmonary  vascularity is normal. The costophrenic angles are sharply defined. Mild disc  space narrowing with marginal spurring involves the mid and lower thoracic  spine. .      Impression IMPRESSION:    No acute finding.          Results for orders placed in visit on 10/09/20   CT ABD [facility]  [phone]      Imaging Result       Name:    Kyler Robison ([id number])  Sex: Female :  1955 Ordering Provider: Corinn Sever CC Provider:    Diagnosis:     Kidney stone, known, follow up Post op Left PCNL and R URS, LL  Procedures Performed:  CT Abdomen/Pelvis without Contrast  [id number] Exam Date/Time:  09/24/2020  8:06 AM                  EXAM: CT ABDOMEN/PELVIS W/O CONTRAST     CLINICAL INDICATION/HISTORY : Kidney stone, known, follow up  Post op Left PCNL and R URS, LL.     COMPARISON: 7/10/2018     TECHNIQUE: Helical scan of the abdomen and pelvis was obtained  from the diaphragm to the symphysis without oral and without uneventful IV contrast administration.     All CT scans at this facility are performed using dose optimization technique as appropriate to a performed exam, to include automated exposure control, adjustment of the MA and/or kV according to patient size (including appropriate matching for site specific examinations), or use of iterative reconstruction technique.     FINDINGS:   Lung Bases: Streaky atelectasis and mild bronchiectasis at the lung bases bilaterally. Right mastectomy     Liver: Unremarkable     Gallbladder:  Unremarkable  Biliary: Unremarkable     Spleen: Unremarkable     Adrenals:  Unremarkable     Pancreas: Unremarkable     Kidneys: There is a right nephroureteral stent. Left percutaneous nephrostomy. There are a few small 1 to 3 mm right renal calculi. 12 mm x 16 mm calculus mid to lower left kidney. Several smaller adjacent calculi lower pole left kidney. Mild left kidney perinephric stranding likely related to recent procedure. No hydronephrosis. There is some vague hyperdensity in the upper pole of the right kidney which is nonspecific, possibly an area of early dystrophic calcification.     Stomach, small bowel, colon:  Normal appendix.     Lymph nodes:  No adenopathy     Vasculature/Aorta:  Unremarkable     Peritoneal spaces:  No ascites or free air     Bladder:  Hager catheter in the bladder which is decompressed and not well evaluated.  There is air in the bladder likely related to Hager catheter.     Pelvic structures: No evidence for mass.     Bones:  Unremarkable for age     IMPRESSION     1.         Several residual left renal calculi, largest measures 1.6 cm.  2.          Several tiny right renal calculi. 3.         Right nephroureteral stent. Left percutaneous nephrostomy. Mild post procedure changes left kidney No hydronephrosis.       Signed By: Virgilio Murdock MD on 9/24/2020 8:28 AM           Dictated by: Katlyn Mckinney on Thu Sep 24, 2020  8:22:08 AM EDT   Signed Sasha Blue MD   9/24/2020  8:28 AM  [address] Radiologist [[id number]]    ~~This report was copied and pasted from the servicing EHR system. ~~            Assessment:     1. Malignant neoplasm of upper-outer quadrant of right female breast, unspecified estrogen receptor status ([address]    2. Malignant neoplasm of upper-outer quadrant of right breast in female, estrogen receptor positive ([address]    3. Long term current use of aromatase inhibitor    4. Arthritis    5. Lymphedema of upper extremity following lymphadenectomy        Plan:   Malignant neoplasm of upper-outer quadrant of right female breast, positive estrogen receptor   -- Patient was being followed by Dr. Daphne Sun who retired recently. -- Her Letrozole started on 08/08/2014. She was advised to continue Letrozole 2.5mg PO daily for full 10 years  -- 11/07/2019 Left Mammogram: No evidence of malignancy. Routine follow-up was recommended. -- Clinically the patient is doing well and has no evidence of disease recurrence. -- 10/5/2020 CA 27-29: 33.7 WNL. Plan:  -- The patient was instructed to continue doing her breast exams on a monthly basis. -- The patient was advised to notify us if any new breast pain, new palpable nodule, nipple inversion, spontaneous nipple discharge especially if bloody, breast skin changes, unintentional weight loss, worsen fatigue, new bone pain or back pain, or any concerns. -- I have advised her to follow up her PCP to continue age-appropriate cancer screening.   -- I have educated her regarding lifestyle modifications, minimizing alcohol intake, refraining from smoking, healthy diet, and physical activity. -- Her next mammogram will be due on November this year. She will f/u her PCP/GYN for the order. -- We will continue to monitor CBC, chemistry, CA 2729. Long term use of aromatase inhibitor:   -- The patient currently taking Letrozole daily. She states she is tolerating the medication well and this will be continued. -- I have advised the patient to follow-up with her PCP for DEXA scan and checking vitamin D level.  + Advised on adequate amounts of calcium (at least 1,200 mg per day) and vitamin D (800-1,000 IU per day). The higher dose of vitamin D may be needed if vitamin D deficiency.  + Recommend regular muscle-strengthening exercise to reduce the risk of falls and fractures. + Advise avoidance of tobacco smoking and excessive alcohol intake. Chronic pain, Hx arthritis  -- She has been taking percocet PRN for chronic low back pain and asked for refill today. -- We will refill small amount of her percocet today. I will defer long term pain control to PCP or specialist.      Lymphedema of upper extremity following lymphadenectomy  -- She continues to wear her sleeve, glove, and chest garment. -- We advised her to to avoid lifting more than 10 pounds using the right arm and hand. -- She reports that the lymphedema has significantly improved since her last clinic visit. -- We will see the patient back in clinic in about 4 months. Always sooner if required. The patient can have lab done prior our next clinic visit. Orders Placed This Encounter    oxyCODONE-acetaminophen (PERCOCET) 5-325 mg per tablet     Sig: Take 1 Tab by mouth every eight (8) hours as needed for Pain for up to 3 days. Max Daily Amount: 3 Tabs. Dispense:  4 Tab     Refill:  0    letrozole (FEMARA) 2.5 mg tablet     Sig: Take 1 Tab by mouth every evening.      Dispense:  90 Tab Refill:  3           Ms. Darling Barney has a reminder for a \"due or due soon\" health maintenance. I have asked that she contact her primary care provider for follow-up on this health maintenance. All of patient's questions answered to their apparent satisfaction. They verbally show understanding and agreement with aforementioned plan. Noreen Daly MD  10/20/2020          About 25 minutes were spent for this encounter with more than 50% of the time spent in face-to-face counseling, discussing on diagnosis and management plan going forward, and co-ordination of care. Parts of this document has been produced using Dragon dictation system. Unrecognized errors in transcription may be present. Please do not hesitate to reach out for any questions or clarifications.       CC: Lianne Monet, DO

## 2020-10-20 ENCOUNTER — OFFICE VISIT (OUTPATIENT)
Dept: ONCOLOGY | Age: 65
End: 2020-10-20
Payer: COMMERCIAL

## 2020-10-20 VITALS
HEART RATE: 99 BPM | WEIGHT: 240.8 LBS | RESPIRATION RATE: 18 BRPM | SYSTOLIC BLOOD PRESSURE: 150 MMHG | OXYGEN SATURATION: 100 % | TEMPERATURE: 98.9 F | DIASTOLIC BLOOD PRESSURE: 84 MMHG | BODY MASS INDEX: 48.64 KG/M2

## 2020-10-20 DIAGNOSIS — C50.411 MALIGNANT NEOPLASM OF UPPER-OUTER QUADRANT OF RIGHT BREAST IN FEMALE, ESTROGEN RECEPTOR POSITIVE (HCC): ICD-10-CM

## 2020-10-20 DIAGNOSIS — C50.411 MALIGNANT NEOPLASM OF UPPER-OUTER QUADRANT OF RIGHT FEMALE BREAST, UNSPECIFIED ESTROGEN RECEPTOR STATUS (HCC): Primary | ICD-10-CM

## 2020-10-20 DIAGNOSIS — E89.89 LYMPHEDEMA OF UPPER EXTREMITY FOLLOWING LYMPHADENECTOMY: ICD-10-CM

## 2020-10-20 DIAGNOSIS — I89.0 LYMPHEDEMA OF UPPER EXTREMITY FOLLOWING LYMPHADENECTOMY: ICD-10-CM

## 2020-10-20 DIAGNOSIS — Z17.0 MALIGNANT NEOPLASM OF UPPER-OUTER QUADRANT OF RIGHT BREAST IN FEMALE, ESTROGEN RECEPTOR POSITIVE (HCC): ICD-10-CM

## 2020-10-20 DIAGNOSIS — M19.90 ARTHRITIS: ICD-10-CM

## 2020-10-20 DIAGNOSIS — Z79.811 LONG TERM CURRENT USE OF AROMATASE INHIBITOR: ICD-10-CM

## 2020-10-20 PROCEDURE — 99214 OFFICE O/P EST MOD 30 MIN: CPT | Performed by: INTERNAL MEDICINE

## 2020-10-20 RX ORDER — LETROZOLE 2.5 MG/1
2.5 TABLET, FILM COATED ORAL EVERY EVENING
Qty: 90 TAB | Refills: 3 | Status: SHIPPED | OUTPATIENT
Start: 2020-10-20 | End: 2021-06-23 | Stop reason: SDUPTHER

## 2020-10-20 RX ORDER — OXYCODONE AND ACETAMINOPHEN 5; 325 MG/1; MG/1
1 TABLET ORAL
Qty: 4 TAB | Refills: 0 | Status: SHIPPED | OUTPATIENT
Start: 2020-10-20 | End: 2020-10-23

## 2020-10-27 ENCOUNTER — OFFICE VISIT (OUTPATIENT)
Dept: VASCULAR SURGERY | Age: 65
End: 2020-10-27
Payer: COMMERCIAL

## 2020-10-27 VITALS
DIASTOLIC BLOOD PRESSURE: 80 MMHG | HEIGHT: 59 IN | BODY MASS INDEX: 48.38 KG/M2 | OXYGEN SATURATION: 99 % | RESPIRATION RATE: 15 BRPM | SYSTOLIC BLOOD PRESSURE: 122 MMHG | WEIGHT: 240 LBS | HEART RATE: 78 BPM

## 2020-10-27 DIAGNOSIS — R09.89 BILATERAL CAROTID BRUITS: Primary | ICD-10-CM

## 2020-10-27 PROCEDURE — 99213 OFFICE O/P EST LOW 20 MIN: CPT | Performed by: PHYSICIAN ASSISTANT

## 2020-10-27 NOTE — PROGRESS NOTES
Ean Laird    Chief Complaint   Patient presents with    Carotid Artery Stenosis       History and Physical    Ms Rashmi Trevizo had been referred early 2017 by her OB/GYN, concerned for a carotid bruit auscultated on a physical exam  She was referred here for evaluation and Dr Dahlia Severe ordered carotid duplex   She had no significant stenosis noted so only needed surveillance between every 2 years, and she is here for that follow up today   She has well controlled diabetes. She also takes daily baby aspirin    Her biggest issue has been a chronic renal stone.   She is had multiple procedures locally that have just been unsuccessful in retrieving this and so has a pending referral possibly to have to go to [address] Dr [address]    Past Medical History:   Diagnosis Date    Arthritis     Asthma 11/16/2018    Resolved, per patient    Breast CA ([address] 02/28/2014    Right Breast  1-14-19  resolved with surgery    Controlled diabetes mellitus with chronic kidney disease ([address]     stage 3    GERD (gastroesophageal reflux disease) 11/16/2018    Resolved, per patient    Hiatus hernia syndrome     resolved, per pt.  8-6-18    Hypertension     Insulin pump in place     Microhematuria     Morbid obesity ([address] 11/16/2018    BMI = 47.4    Osteopenia     Postmastectomy lymphedema syndrome of right upper extremity 1/18/2020    Recurrent UTI     Staghorn calculus     Struvite kidney stones     Thyroid dysfunction     GOITER    Urethral diverticulum     Urine leukocytes      Patient Active Problem List   Diagnosis Code    Personal history of malignant neoplasm of breast Z85.3    Morbid obesity ([address] E66.01    UTI (urinary tract infection) N39.0    Sepsis ([address] A41.9    Insulin pump status Z96.41    Postmastectomy lymphedema syndrome of right upper extremity I97.2    Controlled diabetes mellitus with chronic kidney disease ([address] E11.22    Staghorn renal calculus N20.0    Acute renal failure superimposed on stage 3 chronic kidney disease ([facility] [facility] N17.9, N18.30     Past Surgical History:   Procedure Laterality Date    HX BREAST BIOPSY  10/18/10    stereotactic - right breast calcifications    HX BREAST BIOPSY  2/14/2014    RIGHT BREAST BIOPSY performed by Mayra Dai MD at [facility] MAIN OR    HX CYST REMOVAL  1997    Bilateral breast.     HX GI      colonoscopy    HX GYN      urethra reconstruction x3    HX HYSTERECTOMY      HX MODIFIED RADICAL MASTECTOMY  3/21/2014    RIGHT MODIFIED RADICAL MASTECTOMY SENTINAL LYMPH NODE BIOPSY performed by Mayra Dai MD at [facility] MAIN OR    HX TUBAL LIGATION      HX UROLOGICAL      4 diverticuli repaired    HX UROLOGICAL  03/29/2011    Cysto, percutaneous nephrolithotomy, antegrade URS, antegrade pyelogram.  Dr. Echavarria Overall, [facility].     UROLOGICAL  12/11/2009    Cysto, flexible URS, right RPG, lasertripsy, JJ stent placement. Dr. Echavarria Overall, [facility].     UROLOGICAL  11/20/2009    Second look right attempted percutaneous nephrolithotomy. Dr. Echavarria Overall, [facility].     UROLOGICAL  10/23/2009    Cysto, right percutaneous nephrolithotomy. Dr. Echavarria Overall, [facility].     UROLOGICAL  08/07/2018    Cysto, bilat RPG, right URS, HLL, right JJ stent placement, right nephrostomy tube removal, Dr. Becerra Mediate, [facility] UROLOGICAL  09/04/2018    cysto, right JJ stent removal, bilat RPG. Dr Becerra Mediate. [id number].   UROLOGICAL  11/20/2018    cysto, bilat RPG, left URS, laser lithotripsy, left JJ stent place. Dr. Becerra Mediate. [id number]   UROLOGICAL  01/25/2019    Left PCNL and removal of left JJ stent. Dr. Becerra Mediate at [id number].   UROLOGICAL  07/31/2020    Cystoscopy, ureteroscopy, fluoroscopy, retrograde pyelogram, and exchange of a double-J stent 6 x 24.  HX UROLOGICAL  09/25/2020    Cystoscopy, ureteroscopy, nephrostomy tube removal, stent placement.      HX UROLOGICAL  09/23/2020    S/p Left PCNL    HX VASCULAR ACCESS  2014    IR GUIDE DIL URET TRACT EXIST INCL NEW ACCESS W TUBE PLACMENT LT  1/25/2019     Current Outpatient Medications   Medication Sig Dispense Refill    letrozole (FEMARA) 2.5 mg tablet Take 1 Tab by mouth every evening. 90 Tab 3    cefdinir (OMNICEF) 300 mg capsule       omeprazole (PRILOSEC) 20 mg capsule       losartan (COZAAR) 50 mg tablet Take 1 Tab by mouth daily.  atorvastatin (LIPITOR) 40 mg tablet       PROLIA 60 mg/mL injection 60 mg every 6 months.  levothyroxine (SYNTHROID) 88 mcg tablet Take 88 mcg by mouth daily.  multivitamin (ONE A DAY) tablet Take 1 Tab by mouth daily. Allergies   Allergen Reactions    Nsaids (Non-Steroidal Anti-Inflammatory Drug) Other (comments)     Patient has chronic kidney disease stage III    Ampicillin Hives       Physical   Visit Vitals  /80 (BP 1 Location: Left arm, BP Patient Position: Sitting)   Pulse 78   Resp 15   Ht 4' 11\" (1.499 m)   Wt 240 lb (108.9 kg)   SpO2 99%   BMI 48.47 kg/m²     General:  Alert, cooperative, no distress. She is wearing a mask   Head:  Normocephalic, without obvious abnormality, atraumatic. Eyes:    Conjunctivae/corneas clear. Pupils equal, round, reactive to light. Extraocular movements intact. Neck:         No JVD   Lungs:   No increased respiratory effort   Extremities: No leg edema   Neurologic: No focal neuro deficits       Vascular studies:  Minimal intimal thickening right internal carotid artery with no evidence of significant stenosis. Mild heterogeneous plaque left internal carotid artery with less than 50% stenosis. Right vertebral artery is occluded. Left vertebral artery is antegrade. Bilateral subclavian's appear normal  The exam was compared to the study performed on 9/7/2018. No significant change bilaterally. The right ICA is minimal with intimal thickening no significant plaquing visualized. Impression/Plan:     ICD-10-CM ICD-9-CM    1. Bilateral carotid bruits  R09.89 785.9 DUPLEX CAROTID BILATERAL     No orders of the defined types were placed in this encounter.     No significant stenosis is created such that surveillance just continue to be warranted on an every 2-year basis. She is on good risk factor control with blood pressure, diabetic, and cholesterol management as well as the baby aspirin. BRIDGET Duenas    Portions of this note have been entered using voice recognition software.

## 2020-10-27 NOTE — PROGRESS NOTES
1. Have you been to an emergency room or urgent care clinic since your last visit?   no  Hospitalized since your last visit? If yes, where, when, and reason for visit? yes  2. Have you seen or consulted any other health care providers outside of the [facility] since your last visit including any procedures, health maintenance items. If yes, where, when and reason for visit?    yes

## 2021-02-16 ENCOUNTER — LAB ONLY (OUTPATIENT)
Dept: ONCOLOGY | Age: 66
End: 2021-02-16

## 2021-02-16 DIAGNOSIS — Z17.0 MALIGNANT NEOPLASM OF UPPER-OUTER QUADRANT OF RIGHT BREAST IN FEMALE, ESTROGEN RECEPTOR POSITIVE (HCC): ICD-10-CM

## 2021-02-16 DIAGNOSIS — Z79.811 LONG TERM CURRENT USE OF AROMATASE INHIBITOR: ICD-10-CM

## 2021-02-16 DIAGNOSIS — C50.411 MALIGNANT NEOPLASM OF UPPER-OUTER QUADRANT OF RIGHT FEMALE BREAST, UNSPECIFIED ESTROGEN RECEPTOR STATUS (HCC): Primary | ICD-10-CM

## 2021-02-16 DIAGNOSIS — C50.411 MALIGNANT NEOPLASM OF UPPER-OUTER QUADRANT OF RIGHT BREAST IN FEMALE, ESTROGEN RECEPTOR POSITIVE (HCC): ICD-10-CM

## 2021-02-16 DIAGNOSIS — E89.89 LYMPHEDEMA OF UPPER EXTREMITY FOLLOWING LYMPHADENECTOMY: ICD-10-CM

## 2021-02-16 DIAGNOSIS — I89.0 LYMPHEDEMA OF UPPER EXTREMITY FOLLOWING LYMPHADENECTOMY: ICD-10-CM

## 2021-02-17 LAB
ALBUMIN SERPL-MCNC: 3.8 G/DL (ref 3.8–4.8)
ALBUMIN/GLOB SERPL: 1.2 {RATIO} (ref 1.2–2.2)
ALP SERPL-CCNC: 91 IU/L (ref 39–117)
ALT SERPL-CCNC: 12 IU/L (ref 0–32)
AST SERPL-CCNC: 10 IU/L (ref 0–40)
BASOPHILS # BLD AUTO: 0.1 X10E3/UL (ref 0–0.2)
BASOPHILS NFR BLD AUTO: 1 %
BILIRUB SERPL-MCNC: 0.3 MG/DL (ref 0–1.2)
BUN SERPL-MCNC: 27 MG/DL (ref 8–27)
BUN/CREAT SERPL: 17 (ref 12–28)
CALCIUM SERPL-MCNC: 9.1 MG/DL (ref 8.7–10.3)
CANCER AG27-29 SERPL-ACNC: 28.8 U/ML (ref 0–38.6)
CHLORIDE SERPL-SCNC: 102 MMOL/L (ref 96–106)
CO2 SERPL-SCNC: 21 MMOL/L (ref 20–29)
CREAT SERPL-MCNC: 1.61 MG/DL (ref 0.57–1)
EOSINOPHIL # BLD AUTO: 0.3 X10E3/UL (ref 0–0.4)
EOSINOPHIL NFR BLD AUTO: 3 %
ERYTHROCYTE [DISTWIDTH] IN BLOOD BY AUTOMATED COUNT: 14.8 % (ref 11.7–15.4)
GLOBULIN SER CALC-MCNC: 3.1 G/DL (ref 1.5–4.5)
GLUCOSE SERPL-MCNC: 203 MG/DL (ref 65–99)
HCT VFR BLD AUTO: 40.6 % (ref 34–46.6)
HGB BLD-MCNC: 12.9 G/DL (ref 11.1–15.9)
IMM GRANULOCYTES # BLD AUTO: 0 X10E3/UL (ref 0–0.1)
IMM GRANULOCYTES NFR BLD AUTO: 0 %
LYMPHOCYTES # BLD AUTO: 2.3 X10E3/UL (ref 0.7–3.1)
LYMPHOCYTES NFR BLD AUTO: 28 %
MCH RBC QN AUTO: 25.7 PG (ref 26.6–33)
MCHC RBC AUTO-ENTMCNC: 31.8 G/DL (ref 31.5–35.7)
MCV RBC AUTO: 81 FL (ref 79–97)
MONOCYTES # BLD AUTO: 0.5 X10E3/UL (ref 0.1–0.9)
MONOCYTES NFR BLD AUTO: 6 %
NEUTROPHILS # BLD AUTO: 5 X10E3/UL (ref 1.4–7)
NEUTROPHILS NFR BLD AUTO: 62 %
PLATELET # BLD AUTO: 288 X10E3/UL (ref 150–450)
POTASSIUM SERPL-SCNC: 4.4 MMOL/L (ref 3.5–5.2)
PROT SERPL-MCNC: 6.9 G/DL (ref 6–8.5)
RBC # BLD AUTO: 5.01 X10E6/UL (ref 3.77–5.28)
SODIUM SERPL-SCNC: 139 MMOL/L (ref 134–144)
WBC # BLD AUTO: 8.1 X10E3/UL (ref 3.4–10.8)

## 2021-02-20 NOTE — PROGRESS NOTES
Hematology/Oncology  Progress Note    Name: Lucho Rodriguez  Date: 2021  : 1955    Ms. Leandra Wilson is a 77year old female who was seen for management of her invasive ductal adenocarcinoma right and anemia. Current therapy: Femara 2.5mg PO daily and oral iron supplementation daily. Subjective:     Ms. Leandra Wilson is a 77year-old woman who is currently taking Femara 2.5mg PO daily as long-term management for her invasive ductal carcinoma of the breast. Patient was being followed by Dr. Mendez Nicole who retired recently. She states she continues to tolerate the medication well. She has been taking percocet PRN for chronic low back pain and asked for refill today. The patient reports that she is doing her breast self-exam on a monthly basis. Additionally, she continues to take the iron supplement once daily. She reports she is wearing her sleeve and compression garment for her lymphedema. Today she said she will probably need therapy for lymphedema. Since last visit the patient reported doing well. She denied any new complaints. Denied fever, chills, night sweat, unintentional weight loss, skin lumps or bumps, acute bleeding or bruising issues. No acute bleeding, blood in stool, dark stool, melena, hematochezia, hemoptysis, dark urine, or easily bruising. Denied headache, acute vision change, dizziness, chest pain, worsen shortness of breath, palpitation, productive cough, nausea, vomiting, abdominal pain, altered bowel habits, dysuria, new bone pain or back pain, focal numbness or weakness. Past medical history, family history, and social history were reviewed and remain unchanged.     Past Medical History:   Diagnosis Date    Arthritis     Asthma 2018    Resolved, per patient    Breast CA ([facility] 2014    Right Breast  19  resolved with surgery    Controlled diabetes mellitus with chronic kidney disease ([facility]     stage 3    GERD (gastroesophageal reflux disease) 2018    Resolved, per patient    Hiatus hernia syndrome     resolved, per pt.  8-6-18    Hypertension     Insulin pump in place     Microhematuria     Morbid obesity ([address] 11/16/2018    BMI = 47.4    Osteopenia     Postmastectomy lymphedema syndrome of right upper extremity 1/18/2020    Recurrent UTI     Staghorn calculus     Struvite kidney stones     Thyroid dysfunction     GOITER    Urethral diverticulum     Urine leukocytes      Past Surgical History:   Procedure Laterality Date    HX BREAST BIOPSY  10/18/10    stereotactic - right breast calcifications    HX BREAST BIOPSY  2/14/2014    RIGHT BREAST BIOPSY performed by Michael Martin MD at [address] [place] [address] HX CYST REMOVAL  1997    Bilateral breast.     HX GI      colonoscopy    HX GYN      urethra reconstruction x3    HX HYSTERECTOMY      HX MODIFIED RADICAL MASTECTOMY  3/21/2014    RIGHT MODIFIED RADICAL MASTECTOMY SENTINAL LYMPH NODE BIOPSY performed by Michael Martin MD at [facility] MAIN OR    HX TUBAL LIGATION      HX UROLOGICAL      4 diverticuli repaired    HX UROLOGICAL  03/29/2011    Cysto, percutaneous nephrolithotomy, antegrade URS, antegrade pyelogram.  Dr. Jacinto Mix, [facility].    HX UROLOGICAL  12/11/2009    Cysto, flexible URS, right RPG, lasertripsy, JJ stent placement. Dr. Jacinto Mix, [facility].    HX UROLOGICAL  11/20/2009    Second look right attempted percutaneous nephrolithotomy. Dr. Jacinto Mix, [facility].     UROLOGICAL  10/23/2009    Cysto, right percutaneous nephrolithotomy. Dr. Jacinto Mix, [facility].     UROLOGICAL  08/07/2018    Cysto, bilat RPG, right URS, HLL, right JJ stent placement, right nephrostomy tube removal, Dr. Audra Hughes, [facility]    HX UROLOGICAL  09/04/2018    cysto, right JJ stent removal, bilat RPG. Dr Audra Hughes. [id number]  HX UROLOGICAL  11/20/2018    cysto, bilat RPG, left URS, laser lithotripsy, left JJ stent place. Dr. Audra Hughes. [id number]   UROLOGICAL  01/25/2019    Left PCNL and removal of left JJ stent. Dr. Audra Hughes at [address]      UROLOGICAL  07/31/2020    Cystoscopy, ureteroscopy, fluoroscopy, retrograde pyelogram, and exchange of a double-J stent 6 x 24.  HX UROLOGICAL  09/25/2020    Cystoscopy, ureteroscopy, nephrostomy tube removal, stent placement.      HX UROLOGICAL  09/23/2020    S/p Left PCNL    HX VASCULAR ACCESS  2014    IR GUIDE DIL URET TRACT EXIST INCL NEW ACCESS W TUBE PLACMENT LT  1/25/2019     Social History     Socioeconomic History    Marital status: SINGLE     Spouse name: Not on file    Number of children: Not on file    Years of education: Not on file    Highest education level: Not on file   Occupational History    Occupation:      Employer: [employer] resource strain: Not on file    Food insecurity     Worry: Not on file     Inability: Not on file   [employer] needs     Medical: Not on file     Non-medical: Not on file   Tobacco Use    Smoking status: Never Smoker    Smokeless tobacco: Never Used   Substance and Sexual Activity    Alcohol use: Never     Frequency: Never     Comment: occasionally    Drug use: No    Sexual activity: Not on file   Lifestyle    Physical activity     Days per week: Not on file     Minutes per session: Not on file    Stress: Not on file   Relationships    Social connections     Talks on phone: Not on file     Gets together: Not on file     Attends Spiritism service: Not on file     Active member of club or organization: Not on file     Attends meetings of clubs or organizations: Not on file     Relationship status: Not on file    Intimate partner violence     Fear of current or ex partner: Not on file     Emotionally abused: Not on file     Physically abused: Not on file     Forced sexual activity: Not on file   Other Topics Concern    Not on file   Social History Narrative    Not on file     Family History   Problem Relation Age of Onset    Breast Cancer Sister 79    Diabetes Mother     Hypertension Mother     Breast Cancer Sister 39  Hypertension Other         Parent    Diabetes Other         parent    Stroke Other         parent    Hypertension Other         sibling    Diabetes Other         sibling    Osteoporosis Other         sibling    Breast Cancer Sister     Diabetes Sister     Diabetes Sister     Diabetes Sister      Current Outpatient Medications   Medication Sig Dispense Refill    famotidine (PEPCID) 20 mg tablet       HumaLOG U-100 Insulin 100 unit/mL injection       letrozole (FEMARA) 2.5 mg tablet Take 1 Tab by mouth every evening. 90 Tab 3    cefdinir (OMNICEF) 300 mg capsule       omeprazole (PRILOSEC) 20 mg capsule       losartan (COZAAR) 50 mg tablet Take 1 Tab by mouth daily.  atorvastatin (LIPITOR) 40 mg tablet       PROLIA 60 mg/mL injection 60 mg every 6 months.  levothyroxine (SYNTHROID) 88 mcg tablet Take 88 mcg by mouth daily.  multivitamin (ONE A DAY) tablet Take 1 Tab by mouth daily. Review of Systems   Constitutional: Negative for chills, diaphoresis, fever, malaise/fatigue and weight loss. Respiratory: Negative for cough, hemoptysis, shortness of breath and wheezing. Cardiovascular: Negative for chest pain, palpitations and leg swelling. Gastrointestinal: Negative for abdominal pain, diarrhea, heartburn, nausea and vomiting. Genitourinary: Negative for dysuria, frequency, hematuria and urgency. Musculoskeletal: Negative for joint pain and myalgias. Skin: Negative for itching and rash. Neurological: Negative for dizziness, seizures, weakness and headaches. Psychiatric/Behavioral: Negative for depression. The patient does not have insomnia.              Objective:     Visit Vitals  BP (!) 142/84   Pulse 98   Temp 98.4 °F (36.9 °C)   Ht 4' 11\" (1.499 m)   Wt 112.5 kg (248 lb)   SpO2 100%   BMI 50.09 kg/m²       ECOG Performance Status (grade): 1  0 - able to carry on all pre-disease activity w/out restriction  1 - restricted but able to carry out light work  2 - ambulatory and can self- care but unable to carry out work  3 - bed or chair >50% of waking hours  4 - completely disable, total care, confined to bed or chair    Physical Exam  Constitutional:       Appearance: Normal appearance. She is obese. HENT:      Head: Normocephalic and atraumatic. Eyes:      Pupils: Pupils are equal, round, and reactive to light. Neck:      Musculoskeletal: Neck supple. Cardiovascular:      Rate and Rhythm: Normal rate and regular rhythm. Heart sounds: Normal heart sounds. Pulmonary:      Effort: Pulmonary effort is normal.      Breath sounds: Normal breath sounds. Abdominal:      General: Bowel sounds are normal.      Palpations: Abdomen is soft. Tenderness: There is no abdominal tenderness. There is no guarding. Musculoskeletal: Normal range of motion. Right lower leg: No edema. Left lower leg: No edema. Skin:     General: Skin is warm. Neurological:      General: No focal deficit present. Mental Status: She is alert and oriented to person, place, and time. Mental status is at baseline. Anterior chest wall and breast: Clinically there is no evidence of disease recurrence. Examination of the chest wall and breast shows no mass, or abnormality. The axilla reveals no palpable axillary lymphadenopathy. Diagnostics:      No results found for this or any previous visit (from the past 96 hour(s)). Imaging:  Results for orders placed during the hospital encounter of 01/25/19   IR GUIDE DIL URET TRACT EXIST INCL NEW ACCESS W TUBE PLACMENT LT    Narrative Indication: Renal calculi. Left staghorn calculi. Impression IMPRESSION: Successful left antegrade pyelogram, left percutaneous nephrostomy,  tract dilatation and ureteral stent placement. The  film shows multiple left renal calculi one of which is a staghorn. A  double-J left ureteral stent catheter is in place.     Using aseptic technique under fluoroscopic control a left antegrade pyelogram  was performed with a Chiba needle. This demonstrates small tubal large calculi  in the lower pole collecting system with at least one or perhaps 2 Staghorn  calculi. There is free flow down the ureter through the double-J ureteral stent  into the bladder. Catheter and guidewire were techniques were used to select a lower pole stone  filled calyx, enter the collecting system, work into the ureter, dilated the  tract to [address] and place a 7 [address] [place] Ansell percutaneous ureteral stent  catheter into the distal ureter. This was secured in place and capped to  internal drainage. There were no complications and the patient left the  radiology department in satisfactory condition. The radiology nurse provided moderate intravenous conscious sedation with  fentanyl and Versed. Start time was 1049 and end time 1133.    10.6 minutes of fluoroscopy time was utilized. The DAP equals 24.5280 kenneth per  centimeter square. Results for orders placed during the hospital encounter of 20   XR CHEST PORT    Narrative EXAM: CHEST ONE VIEW  portable 1749 hours    CLINICAL HISTORY/INDICATION: Sepsis    COMPARISON: Chest x-ray 2014. TECHNIQUE: One view obtained. FINDINGS:     The cardiac and mediastinal silhouette is normal. The lungs are clear. Pulmonary  vascularity is normal. The costophrenic angles are sharply defined. Mild disc  space narrowing with marginal spurring involves the mid and lower thoracic  spine. .      Impression IMPRESSION:    No acute finding.          Results for orders placed in visit on 10/09/20   CT ABD [address]  [phone]      Imaging Result       Name:    Tonny Diamond ([id number])  Sex: Female :  1955 Ordering Provider: Dulce MENDOZA Provider:    Diagnosis:     Kidney stone, known, follow up Post op Left PCNL and R URS, LL  Procedures Performed:  CT Abdomen/Pelvis without Contrast  [id number] Exam Date/Time:  09/24/2020  8:06 AM                  EXAM: CT ABDOMEN/PELVIS W/O CONTRAST     CLINICAL INDICATION/HISTORY : Kidney stone, known, follow up  Post op Left PCNL and R URS, LL.     COMPARISON: 7/10/2018     TECHNIQUE: Helical scan of the abdomen and pelvis was obtained  from the diaphragm to the symphysis without oral and without uneventful IV contrast administration.     All CT scans at this facility are performed using dose optimization technique as appropriate to a performed exam, to include automated exposure control, adjustment of the MA and/or kV according to patient size (including appropriate matching for site specific examinations), or use of iterative reconstruction technique.     FINDINGS:   Lung Bases: Streaky atelectasis and mild bronchiectasis at the lung bases bilaterally. Right mastectomy     Liver: Unremarkable     Gallbladder:  Unremarkable  Biliary: Unremarkable     Spleen: Unremarkable     Adrenals:  Unremarkable     Pancreas: Unremarkable     Kidneys: There is a right nephroureteral stent. Left percutaneous nephrostomy. There are a few small 1 to 3 mm right renal calculi. 12 mm x 16 mm calculus mid to lower left kidney. Several smaller adjacent calculi lower pole left kidney. Mild left kidney perinephric stranding likely related to recent procedure. No hydronephrosis. There is some vague hyperdensity in the upper pole of the right kidney which is nonspecific, possibly an area of early dystrophic calcification.     Stomach, small bowel, colon:  Normal appendix.     Lymph nodes:  No adenopathy     Vasculature/Aorta:  Unremarkable     Peritoneal spaces:  No ascites or free air     Bladder:  Hager catheter in the bladder which is decompressed and not well evaluated.  There is air in the bladder likely related to Hager catheter.     Pelvic structures: No evidence for mass.     Bones:  Unremarkable for age     IMPRESSION     1.         Several residual left renal calculi, largest measures 1.6 cm.  2.          Several tiny right renal calculi. 3.         Right nephroureteral stent. Left percutaneous nephrostomy. Mild post procedure changes left kidney No hydronephrosis.       Signed By: Paramjit Penaloza MD on 9/24/2020 8:28 AM           Dictated by: Paty Chavez on Thu Sep 24, 2020  8:22:08 AM EDT   Signed Sharla Palomo MD   9/24/2020  8:28 AM  [address] Radiologist [[id number]]    ~~This report was copied and pasted from the servicing EHR system. ~~            Assessment:     1. Malignant neoplasm of upper-outer quadrant of right female breast, unspecified estrogen receptor status ([address]    2. Malignant neoplasm of upper-outer quadrant of right breast in female, estrogen receptor positive ([address]    3. Long term current use of aromatase inhibitor    4. Lymphedema of upper extremity following lymphadenectomy        Plan:   Malignant neoplasm of upper-outer quadrant of right female breast, positive estrogen receptor   -- Patient was being followed by Dr. Deloria Severance who retired. -- Her Letrozole started on 08/08/2014. She was advised to continue Letrozole 2.5mg PO daily for full 10 years  -- 11/07/2019 Left Mammogram: No evidence of malignancy. Routine follow-up was recommended. -- Clinically the patient is doing well and has no evidence of disease recurrence. Today I have reviewed with the patient about recent lab reports on 2/16/2021     Plan:  -- The patient was instructed to continue doing her breast exams on a monthly basis. -- The patient was advised to notify us if any new breast pain, new palpable nodule, nipple inversion, spontaneous nipple discharge especially if bloody, breast skin changes, unintentional weight loss, worsen fatigue, new bone pain or back pain, or any concerns. -- I have advised her to follow up her PCP to continue age-appropriate cancer screening.   -- I have educated her regarding lifestyle modifications, minimizing alcohol intake, refraining from smoking, healthy diet, and physical activity. -- She was due for her next mammogram. Today she said she will call to schedule her mammogram soon. Long term use of aromatase inhibitor:   -- The patient currently taking Letrozole daily. She states she is tolerating the medication well and this will be continued. -- I have advised the patient to follow-up with her PCP for DEXA scan and checking vitamin D level.  + Advised on adequate amounts of calcium (at least 1,200 mg per day) and vitamin D (800-1,000 IU per day). The higher dose of vitamin D may be needed if vitamin D deficiency.  + Recommend regular muscle-strengthening exercise to reduce the risk of falls and fractures. + Advise avoidance of tobacco smoking and excessive alcohol intake. Chronic pain, Hx arthritis  -- She has been taking percocet PRN for chronic low back pain. We have deferred her long term pain control to PCP or specialists. Lymphedema of upper extremity following lymphadenectomy  -- She continues to wear her sleeve, glove, and chest garment. -- We advised her to to avoid lifting more than 10 pounds using the right arm and hand. -- She reports that her lymphedema will probably need therapy at [facility] time. We will refer to lymphedema clinic. -- We will see the patient back in clinic in about 4 months. Always sooner if required. The patient can have lab done prior our next clinic visit. Orders Placed This Encounter    REFERRAL TO LYMPHEDEMA CLINIC     Referral Priority:   Routine     Referral Type:   Consultation     Referral Reason:   Specialty Services Required     Requested Specialty:   Oncology     Number of Visits Requested:   1           Ms. Elizabeth De La Vega has a reminder for a \"due or due soon\" health maintenance. I have asked that she contact her primary care provider for follow-up on this health maintenance.    All of patient's questions answered to their apparent satisfaction. They verbally show understanding and agreement with aforementioned plan. Preston Bridges MD  2/23/2021          About 25 minutes were spent for this encounter with more than 50% of the time spent in face-to-face counseling, discussing on diagnosis and management plan going forward, and co-ordination of care. Parts of this document has been produced using Dragon dictation system. Unrecognized errors in transcription may be present. Please do not hesitate to reach out for any questions or clarifications.       CC: Ketty Rivero DO

## 2021-02-23 ENCOUNTER — OFFICE VISIT (OUTPATIENT)
Dept: ONCOLOGY | Age: 66
End: 2021-02-23
Payer: COMMERCIAL

## 2021-02-23 VITALS
WEIGHT: 248 LBS | DIASTOLIC BLOOD PRESSURE: 84 MMHG | TEMPERATURE: 98.4 F | SYSTOLIC BLOOD PRESSURE: 142 MMHG | HEART RATE: 98 BPM | BODY MASS INDEX: 50 KG/M2 | HEIGHT: 59 IN | OXYGEN SATURATION: 100 %

## 2021-02-23 DIAGNOSIS — Z79.811 LONG TERM CURRENT USE OF AROMATASE INHIBITOR: ICD-10-CM

## 2021-02-23 DIAGNOSIS — E89.89 LYMPHEDEMA OF UPPER EXTREMITY FOLLOWING LYMPHADENECTOMY: ICD-10-CM

## 2021-02-23 DIAGNOSIS — I89.0 LYMPHEDEMA OF UPPER EXTREMITY FOLLOWING LYMPHADENECTOMY: ICD-10-CM

## 2021-02-23 DIAGNOSIS — C50.411 MALIGNANT NEOPLASM OF UPPER-OUTER QUADRANT OF RIGHT BREAST IN FEMALE, ESTROGEN RECEPTOR POSITIVE (HCC): ICD-10-CM

## 2021-02-23 DIAGNOSIS — C50.411 MALIGNANT NEOPLASM OF UPPER-OUTER QUADRANT OF RIGHT FEMALE BREAST, UNSPECIFIED ESTROGEN RECEPTOR STATUS (HCC): Primary | ICD-10-CM

## 2021-02-23 DIAGNOSIS — Z17.0 MALIGNANT NEOPLASM OF UPPER-OUTER QUADRANT OF RIGHT BREAST IN FEMALE, ESTROGEN RECEPTOR POSITIVE (HCC): ICD-10-CM

## 2021-02-23 PROCEDURE — 99214 OFFICE O/P EST MOD 30 MIN: CPT | Performed by: INTERNAL MEDICINE

## 2021-03-01 ENCOUNTER — TRANSCRIBE ORDER (OUTPATIENT)
Dept: SCHEDULING | Age: 66
End: 2021-03-01

## 2021-03-01 DIAGNOSIS — Z12.31 ENCOUNTER FOR SCREENING MAMMOGRAM FOR MALIGNANT NEOPLASM OF BREAST: Primary | ICD-10-CM

## 2021-03-02 ENCOUNTER — HOSPITAL ENCOUNTER (OUTPATIENT)
Dept: MAMMOGRAPHY | Age: 66
Discharge: HOME | End: 2021-03-02
Attending: INTERNAL MEDICINE
Payer: COMMERCIAL

## 2021-03-02 DIAGNOSIS — Z12.31 ENCOUNTER FOR SCREENING MAMMOGRAM FOR MALIGNANT NEOPLASM OF BREAST: ICD-10-CM

## 2021-03-02 PROCEDURE — 77063 BREAST TOMOSYNTHESIS BI: CPT

## 2021-06-09 ENCOUNTER — LAB ONLY (OUTPATIENT)
Dept: ONCOLOGY | Age: 66
End: 2021-06-09

## 2021-06-09 ENCOUNTER — HOSPITAL ENCOUNTER (OUTPATIENT)
Dept: LAB | Age: 66
Discharge: HOME | End: 2021-06-09
Payer: COMMERCIAL

## 2021-06-09 DIAGNOSIS — C50.411 MALIGNANT NEOPLASM OF UPPER-OUTER QUADRANT OF RIGHT FEMALE BREAST, UNSPECIFIED ESTROGEN RECEPTOR STATUS (HCC): Primary | ICD-10-CM

## 2021-06-09 LAB
ALBUMIN SERPL-MCNC: 3.2 G/DL (ref 3.4–5)
ALBUMIN/GLOB SERPL: 0.7 {RATIO} (ref 0.8–1.7)
ALP SERPL-CCNC: 116 U/L (ref 45–117)
ALT SERPL-CCNC: 22 U/L (ref 13–56)
ANION GAP SERPL CALC-SCNC: 10 MMOL/L (ref 3–18)
AST SERPL-CCNC: 17 U/L (ref 10–38)
BASOPHILS # BLD: 0.1 K/UL (ref 0–0.1)
BASOPHILS NFR BLD: 1 % (ref 0–2)
BILIRUB SERPL-MCNC: 0.4 MG/DL (ref 0.2–1)
BUN SERPL-MCNC: 24 MG/DL (ref 7–18)
BUN/CREAT SERPL: 14 (ref 12–20)
CALCIUM SERPL-MCNC: 8.2 MG/DL (ref 8.5–10.1)
CHLORIDE SERPL-SCNC: 107 MMOL/L (ref 100–111)
CO2 SERPL-SCNC: 22 MMOL/L (ref 21–32)
CREAT SERPL-MCNC: 1.66 MG/DL (ref 0.6–1.3)
DIFFERENTIAL METHOD BLD: ABNORMAL
EOSINOPHIL # BLD: 0.2 K/UL (ref 0–0.4)
EOSINOPHIL NFR BLD: 2 % (ref 0–5)
ERYTHROCYTE [DISTWIDTH] IN BLOOD BY AUTOMATED COUNT: 14.3 % (ref 11.6–14.5)
GLOBULIN SER CALC-MCNC: 4.3 G/DL (ref 2–4)
GLUCOSE SERPL-MCNC: 330 MG/DL (ref 74–99)
HCT VFR BLD AUTO: 41.8 % (ref 35–45)
HGB BLD-MCNC: 13.3 G/DL (ref 12–16)
LYMPHOCYTES # BLD: 2 K/UL (ref 0.9–3.6)
LYMPHOCYTES NFR BLD: 20 % (ref 21–52)
MCH RBC QN AUTO: 25.7 PG (ref 24–34)
MCHC RBC AUTO-ENTMCNC: 31.8 G/DL (ref 31–37)
MCV RBC AUTO: 80.7 FL (ref 74–97)
MONOCYTES # BLD: 0.4 K/UL (ref 0.05–1.2)
MONOCYTES NFR BLD: 4 % (ref 3–10)
NEUTS SEG # BLD: 7.1 K/UL (ref 1.8–8)
NEUTS SEG NFR BLD: 72 % (ref 40–73)
PLATELET # BLD AUTO: 285 K/UL (ref 135–420)
PMV BLD AUTO: 10.3 FL (ref 9.2–11.8)
POTASSIUM SERPL-SCNC: 4.8 MMOL/L (ref 3.5–5.5)
PROT SERPL-MCNC: 7.5 G/DL (ref 6.4–8.2)
RBC # BLD AUTO: 5.18 M/UL (ref 4.2–5.3)
SODIUM SERPL-SCNC: 139 MMOL/L (ref 136–145)
WBC # BLD AUTO: 9.9 K/UL (ref 4.6–13.2)

## 2021-06-09 PROCEDURE — 85025 COMPLETE CBC W/AUTO DIFF WBC: CPT

## 2021-06-09 PROCEDURE — 80053 COMPREHEN METABOLIC PANEL: CPT

## 2021-06-09 PROCEDURE — 86300 IMMUNOASSAY TUMOR CA 15-3: CPT

## 2021-06-10 LAB — CANCER AG27-29 SERPL-ACNC: 32.9 U/ML (ref 0–38.6)

## 2021-06-23 ENCOUNTER — VIRTUAL VISIT (OUTPATIENT)
Dept: ONCOLOGY | Age: 66
End: 2021-06-23
Payer: MEDICARE

## 2021-06-23 DIAGNOSIS — C50.411 MALIGNANT NEOPLASM OF UPPER-OUTER QUADRANT OF RIGHT FEMALE BREAST, UNSPECIFIED ESTROGEN RECEPTOR STATUS (HCC): Primary | ICD-10-CM

## 2021-06-23 DIAGNOSIS — I89.0 LYMPHEDEMA OF UPPER EXTREMITY FOLLOWING LYMPHADENECTOMY: ICD-10-CM

## 2021-06-23 DIAGNOSIS — Z17.0 MALIGNANT NEOPLASM OF UPPER-OUTER QUADRANT OF RIGHT BREAST IN FEMALE, ESTROGEN RECEPTOR POSITIVE (HCC): ICD-10-CM

## 2021-06-23 DIAGNOSIS — Z79.811 LONG TERM CURRENT USE OF AROMATASE INHIBITOR: ICD-10-CM

## 2021-06-23 DIAGNOSIS — C50.411 MALIGNANT NEOPLASM OF UPPER-OUTER QUADRANT OF RIGHT BREAST IN FEMALE, ESTROGEN RECEPTOR POSITIVE (HCC): ICD-10-CM

## 2021-06-23 DIAGNOSIS — E89.89 LYMPHEDEMA OF UPPER EXTREMITY FOLLOWING LYMPHADENECTOMY: ICD-10-CM

## 2021-06-23 PROCEDURE — 99442 PR PHYS/QHP TELEPHONE EVALUATION 11-20 MIN: CPT | Performed by: NURSE PRACTITIONER

## 2021-06-23 RX ORDER — LETROZOLE 2.5 MG/1
2.5 TABLET, FILM COATED ORAL EVERY EVENING
Qty: 90 TABLET | Refills: 3 | Status: SHIPPED | OUTPATIENT
Start: 2021-06-23 | End: 2022-07-07 | Stop reason: SDUPTHER

## 2021-06-23 NOTE — PROGRESS NOTES
Jerardo Finley is a 74 y.o. female, evaluated via audio-only technology on 6/23/2021 for No chief complaint on file. .    Assessment & Plan:   Malignant neoplasm of upper-outer quadrant of right female breast, positive estrogen receptor   -- Patient was being followed by Dr. Megan Lopez who retired. -- Her Letrozole started on 08/08/2014. She was advised to continue Letrozole 2.5mg PO daily for full 10 years  -- 3/02/2021 Left Mammogram: No evidence of malignancy. Routine follow-up was recommended. -- Clinically the patient is doing well and has no evidence of disease recurrence. Today I have reviewed with the patient about recent lab reports on 6/9/2021     Plan:  -- The patient was instructed to continue doing her breast exams on a monthly basis. -- The patient was advised to notify us if any new breast pain, new palpable nodule, nipple inversion, spontaneous nipple discharge especially if bloody, breast skin changes, unintentional weight loss, worsen fatigue, new bone pain or back pain, or any concerns. -- I have advised her to follow up her PCP to continue age-appropriate cancer screening. -- I have educated her regarding lifestyle modifications, minimizing alcohol intake, refraining from smoking, healthy diet, and physical activity.         Long term use of aromatase inhibitor:   -- The patient currently taking Letrozole daily. She states she is tolerating the medication without any issue and this will be continued. -- I have advised the patient to follow-up with her PCP for DEXA scan and checking vitamin D level.  + Advised on adequate amounts of calcium (at least 1,200 mg per day) and vitamin D (800-1,000 IU per day). The higher dose of vitamin D may be needed if vitamin D deficiency. + Recommend regular muscle-strengthening exercise to reduce the risk of falls and fractures.   + Advise avoidance of tobacco smoking and excessive alcohol intake.        Lymphedema of upper extremity following lymphadenectomy  -- She continues to wear her sleeve, glove, and chest garment. -- We advised her to to avoid lifting more than 10 pounds using the right arm and hand.    -- She reports that her lymphedema will probably need therapy at athis time. We will refer to lymphedema clinic.        -- We will see the patient back in clinic in about 4 months. Always sooner if required. The patient can have lab done prior our next clinic visit.       12  Subjective:   Ms. Bubba Beaver is a 77year-old woman who is currently taking Femara 2.5mg PO daily as long-term management for her invasive ductal carcinoma of the breast. She states she continues to tolerate the medication well. The patient reports that she is doing her breast self-exam on a monthly basis. Additionally, she continues to take the iron supplement once daily. She reports she is wearing her sleeve and compression garment for her lymphedema. She report that she is being followed by Nephrology for her CKD. Denied fever, chills, night sweat, unintentional weight loss, skin lumps or bumps, acute bleeding or bruising issues. No acute bleeding, blood in stool, dark stool, melena, hematochezia, hemoptysis, dark urine, or easily bruising. Denied headache, acute vision change, dizziness, chest pain, worsen shortness of breath, palpitation, productive cough, nausea, vomiting, abdominal pain, altered bowel habits, dysuria, new bone pain or back pain, focal numbness or weakness. Prior to Admission medications    Medication Sig Start Date End Date Taking? Authorizing Provider   famotidine (PEPCID) 20 mg tablet  10/14/20   Provider, Historical   HumaLOG U-100 Insulin 100 unit/mL injection  11/25/20   Provider, Historical   letrozole (FEMARA) 2.5 mg tablet Take 1 Tab by mouth every evening.  10/20/20   Jasmyn Pickering MD   cefdinir (OMNICEF) 300 mg capsule  4/3/20   Provider, Historical   omeprazole (PRILOSEC) 20 mg capsule  1/27/20   Provider, Historical   losartan (COZAAR) 50 mg tablet Take 1 Tab by mouth daily. 10/31/19   Provider, Historical   atorvastatin (LIPITOR) 40 mg tablet  9/16/19   Provider, Historical   PROLIA 60 mg/mL injection 60 mg every 6 months. 9/26/18   Provider, Historical   levothyroxine (SYNTHROID) 88 mcg tablet Take 88 mcg by mouth daily. 5/8/18   Provider, Historical   multivitamin (ONE A DAY) tablet Take 1 Tab by mouth daily. Provider, Historical         Review of Systems   Constitutional: Negative. HENT: Negative. Eyes: Negative. Respiratory: Negative. Cardiovascular: Negative. Gastrointestinal: Negative. Genitourinary: Negative. Musculoskeletal: Negative. Skin: Negative. Neurological: Negative. Endo/Heme/Allergies: Negative. Psychiatric/Behavioral: Negative. No flowsheet data found. Merna Perales, who was evaluated through a patient-initiated, synchronous (real-time) audio only encounter, and/or her healthcare decision maker, is aware that it is a billable service, with coverage as determined by her insurance carrier. She provided verbal consent to proceed: YES-Consent obtained within past 12 months  Yes. . She has not had a related appointment within my department in the past 7 days or scheduled within the next 24 hours.       Total Time:11-20 minutes    Pleasant Babinski, NP

## 2021-11-24 ENCOUNTER — HOSPITAL ENCOUNTER (OUTPATIENT)
Dept: LAB | Age: 66
Discharge: HOME | End: 2021-11-24
Payer: COMMERCIAL

## 2021-11-24 ENCOUNTER — TRANSCRIBE ORDER (OUTPATIENT)
Dept: REGISTRATION | Age: 66
End: 2021-11-24

## 2021-11-24 DIAGNOSIS — N18.32 CHRONIC KIDNEY DISEASE (CKD) STAGE G3B/A1, MODERATELY DECREASED GLOMERULAR FILTRATION RATE (GFR) BETWEEN 30-44 ML/MIN/1.73 SQUARE METER AND ALBUMINURIA CREATININE RATIO LESS THAN 30 MG/G (HCC): ICD-10-CM

## 2021-11-24 DIAGNOSIS — N18.32 CHRONIC KIDNEY DISEASE (CKD) STAGE G3B/A1, MODERATELY DECREASED GLOMERULAR FILTRATION RATE (GFR) BETWEEN 30-44 ML/MIN/1.73 SQUARE METER AND ALBUMINURIA CREATININE RATIO LESS THAN 30 MG/G (HCC): Primary | ICD-10-CM

## 2021-11-24 LAB
ALBUMIN SERPL-MCNC: 3.5 G/DL (ref 3.4–5)
ANION GAP SERPL CALC-SCNC: 4 MMOL/L (ref 3–18)
APPEARANCE UR: ABNORMAL
BACTERIA URNS QL MICRO: ABNORMAL /HPF
BASOPHILS # BLD: 0 K/UL (ref 0–0.1)
BASOPHILS NFR BLD: 0 % (ref 0–2)
BILIRUB UR QL: NEGATIVE
BUN SERPL-MCNC: 30 MG/DL (ref 7–18)
BUN/CREAT SERPL: 20 (ref 12–20)
CALCIUM SERPL-MCNC: 9.5 MG/DL (ref 8.5–10.1)
CALCIUM SERPL-MCNC: 9.7 MG/DL (ref 8.5–10.1)
CHLORIDE SERPL-SCNC: 107 MMOL/L (ref 100–111)
CO2 SERPL-SCNC: 28 MMOL/L (ref 21–32)
COLOR UR: YELLOW
CREAT SERPL-MCNC: 1.52 MG/DL (ref 0.6–1.3)
CREAT UR-MCNC: 134 MG/DL (ref 30–125)
DIFFERENTIAL METHOD BLD: NORMAL
EOSINOPHIL # BLD: 0.3 K/UL (ref 0–0.4)
EOSINOPHIL NFR BLD: 3 % (ref 0–5)
EPITH CASTS URNS QL MICRO: ABNORMAL /LPF (ref 0–5)
ERYTHROCYTE [DISTWIDTH] IN BLOOD BY AUTOMATED COUNT: 13.6 % (ref 11.6–14.5)
GLUCOSE SERPL-MCNC: 214 MG/DL (ref 74–99)
GLUCOSE UR STRIP.AUTO-MCNC: 100 MG/DL
HCT VFR BLD AUTO: 42.5 % (ref 35–45)
HGB BLD-MCNC: 13.2 G/DL (ref 12–16)
HGB UR QL STRIP: ABNORMAL
IMM GRANULOCYTES # BLD AUTO: 0 K/UL (ref 0–0.04)
IMM GRANULOCYTES NFR BLD AUTO: 0 % (ref 0–0.5)
KETONES UR QL STRIP.AUTO: NEGATIVE MG/DL
LEUKOCYTE ESTERASE UR QL STRIP.AUTO: ABNORMAL
LYMPHOCYTES # BLD: 2.6 K/UL (ref 0.9–3.6)
LYMPHOCYTES NFR BLD: 26 % (ref 21–52)
MCH RBC QN AUTO: 25.6 PG (ref 24–34)
MCHC RBC AUTO-ENTMCNC: 31.1 G/DL (ref 31–37)
MCV RBC AUTO: 82.4 FL (ref 78–100)
MONOCYTES # BLD: 0.6 K/UL (ref 0.05–1.2)
MONOCYTES NFR BLD: 6 % (ref 3–10)
NEUTS SEG # BLD: 6.5 K/UL (ref 1.8–8)
NEUTS SEG NFR BLD: 65 % (ref 40–73)
NITRITE UR QL STRIP.AUTO: NEGATIVE
NRBC # BLD: 0 K/UL (ref 0–0.01)
NRBC BLD-RTO: 0 PER 100 WBC
PH UR STRIP: 5.5 [PH] (ref 5–8)
PHOSPHATE SERPL-MCNC: 3.9 MG/DL (ref 2.5–4.9)
PLATELET # BLD AUTO: 297 K/UL (ref 135–420)
PMV BLD AUTO: 9.6 FL (ref 9.2–11.8)
POTASSIUM SERPL-SCNC: 4.5 MMOL/L (ref 3.5–5.5)
PROT UR STRIP-MCNC: 100 MG/DL
PROT UR-MCNC: 138 MG/DL
PTH-INTACT SERPL-MCNC: 68 PG/ML (ref 18.4–88)
RBC # BLD AUTO: 5.16 M/UL (ref 4.2–5.3)
RBC #/AREA URNS HPF: ABNORMAL /HPF (ref 0–5)
SODIUM SERPL-SCNC: 139 MMOL/L (ref 136–145)
SP GR UR REFRACTOMETRY: 1.02 (ref 1–1.03)
UROBILINOGEN UR QL STRIP.AUTO: 0.2 EU/DL (ref 0.2–1)
WBC # BLD AUTO: 10.1 K/UL (ref 4.6–13.2)
WBC URNS QL MICRO: ABNORMAL /HPF (ref 0–5)

## 2021-11-24 PROCEDURE — 80069 RENAL FUNCTION PANEL: CPT

## 2021-11-24 PROCEDURE — 81001 URINALYSIS AUTO W/SCOPE: CPT

## 2021-11-24 PROCEDURE — 82570 ASSAY OF URINE CREATININE: CPT

## 2021-11-24 PROCEDURE — 84156 ASSAY OF PROTEIN URINE: CPT

## 2021-11-24 PROCEDURE — 36415 COLL VENOUS BLD VENIPUNCTURE: CPT

## 2021-11-24 PROCEDURE — 85025 COMPLETE CBC W/AUTO DIFF WBC: CPT

## 2021-11-24 PROCEDURE — 83970 ASSAY OF PARATHORMONE: CPT

## 2021-12-08 ENCOUNTER — HOSPITAL ENCOUNTER (OUTPATIENT)
Dept: LAB | Age: 66
Discharge: HOME | End: 2021-12-08
Payer: MEDICARE

## 2021-12-08 ENCOUNTER — LAB ONLY (OUTPATIENT)
Dept: ONCOLOGY | Age: 66
End: 2021-12-08

## 2021-12-08 DIAGNOSIS — C50.411 MALIGNANT NEOPLASM OF UPPER-OUTER QUADRANT OF RIGHT BREAST IN FEMALE, ESTROGEN RECEPTOR POSITIVE (HCC): ICD-10-CM

## 2021-12-08 DIAGNOSIS — Z17.0 MALIGNANT NEOPLASM OF UPPER-OUTER QUADRANT OF RIGHT BREAST IN FEMALE, ESTROGEN RECEPTOR POSITIVE (HCC): ICD-10-CM

## 2021-12-08 DIAGNOSIS — Z79.811 LONG TERM CURRENT USE OF AROMATASE INHIBITOR: ICD-10-CM

## 2021-12-08 DIAGNOSIS — C50.411 MALIGNANT NEOPLASM OF UPPER-OUTER QUADRANT OF RIGHT FEMALE BREAST, UNSPECIFIED ESTROGEN RECEPTOR STATUS (HCC): Primary | ICD-10-CM

## 2021-12-08 LAB
ALBUMIN SERPL-MCNC: 3.2 G/DL (ref 3.4–5)
ALBUMIN/GLOB SERPL: 0.7 {RATIO} (ref 0.8–1.7)
ALP SERPL-CCNC: 142 U/L (ref 45–117)
ALT SERPL-CCNC: 16 U/L (ref 13–56)
ANION GAP SERPL CALC-SCNC: 6 MMOL/L (ref 3–18)
AST SERPL-CCNC: 9 U/L (ref 10–38)
BASOPHILS # BLD: 0.1 K/UL (ref 0–0.1)
BASOPHILS NFR BLD: 1 % (ref 0–2)
BILIRUB SERPL-MCNC: 0.5 MG/DL (ref 0.2–1)
BUN SERPL-MCNC: 21 MG/DL (ref 7–18)
BUN/CREAT SERPL: 13 (ref 12–20)
CALCIUM SERPL-MCNC: 9 MG/DL (ref 8.5–10.1)
CHLORIDE SERPL-SCNC: 109 MMOL/L (ref 100–111)
CO2 SERPL-SCNC: 25 MMOL/L (ref 21–32)
CREAT SERPL-MCNC: 1.68 MG/DL (ref 0.6–1.3)
DIFFERENTIAL METHOD BLD: NORMAL
EOSINOPHIL # BLD: 0.3 K/UL (ref 0–0.4)
EOSINOPHIL NFR BLD: 3 % (ref 0–5)
ERYTHROCYTE [DISTWIDTH] IN BLOOD BY AUTOMATED COUNT: 13.8 % (ref 11.6–14.5)
GLOBULIN SER CALC-MCNC: 4.3 G/DL (ref 2–4)
GLUCOSE SERPL-MCNC: 278 MG/DL (ref 74–99)
HCT VFR BLD AUTO: 40.8 % (ref 35–45)
HGB BLD-MCNC: 12.9 G/DL (ref 12–16)
IMM GRANULOCYTES # BLD AUTO: 0 K/UL (ref 0–0.04)
IMM GRANULOCYTES NFR BLD AUTO: 0 % (ref 0–0.5)
LYMPHOCYTES # BLD: 2.9 K/UL (ref 0.9–3.6)
LYMPHOCYTES NFR BLD: 29 % (ref 21–52)
MCH RBC QN AUTO: 26.1 PG (ref 24–34)
MCHC RBC AUTO-ENTMCNC: 31.6 G/DL (ref 31–37)
MCV RBC AUTO: 82.4 FL (ref 78–100)
MONOCYTES # BLD: 0.6 K/UL (ref 0.05–1.2)
MONOCYTES NFR BLD: 6 % (ref 3–10)
NEUTS SEG # BLD: 6 K/UL (ref 1.8–8)
NEUTS SEG NFR BLD: 61 % (ref 40–73)
NRBC # BLD: 0 K/UL (ref 0–0.01)
NRBC BLD-RTO: 0 PER 100 WBC
PLATELET # BLD AUTO: 309 K/UL (ref 135–420)
PLATELET COMMENTS,PCOM: NORMAL
PMV BLD AUTO: 10.4 FL (ref 9.2–11.8)
POTASSIUM SERPL-SCNC: 4.7 MMOL/L (ref 3.5–5.5)
PROT SERPL-MCNC: 7.5 G/DL (ref 6.4–8.2)
RBC # BLD AUTO: 4.95 M/UL (ref 4.2–5.3)
RBC MORPH BLD: NORMAL
SODIUM SERPL-SCNC: 140 MMOL/L (ref 136–145)
WBC # BLD AUTO: 9.9 K/UL (ref 4.6–13.2)

## 2021-12-08 PROCEDURE — 86300 IMMUNOASSAY TUMOR CA 15-3: CPT

## 2021-12-08 PROCEDURE — 80053 COMPREHEN METABOLIC PANEL: CPT

## 2021-12-08 PROCEDURE — 85025 COMPLETE CBC W/AUTO DIFF WBC: CPT

## 2021-12-08 PROCEDURE — 36415 COLL VENOUS BLD VENIPUNCTURE: CPT

## 2021-12-09 LAB — CANCER AG27-29 SERPL-ACNC: 22.2 U/ML (ref 0–38.6)

## 2022-01-07 ENCOUNTER — OFFICE VISIT (OUTPATIENT)
Dept: ONCOLOGY | Age: 67
End: 2022-01-07
Payer: MEDICARE

## 2022-01-07 VITALS
HEIGHT: 59 IN | OXYGEN SATURATION: 100 % | WEIGHT: 255 LBS | DIASTOLIC BLOOD PRESSURE: 80 MMHG | TEMPERATURE: 98.9 F | HEART RATE: 95 BPM | BODY MASS INDEX: 51.41 KG/M2 | SYSTOLIC BLOOD PRESSURE: 140 MMHG

## 2022-01-07 DIAGNOSIS — Z79.811 LONG TERM CURRENT USE OF AROMATASE INHIBITOR: ICD-10-CM

## 2022-01-07 DIAGNOSIS — C50.411 MALIGNANT NEOPLASM OF UPPER-OUTER QUADRANT OF RIGHT BREAST IN FEMALE, ESTROGEN RECEPTOR POSITIVE (HCC): ICD-10-CM

## 2022-01-07 DIAGNOSIS — Z17.0 MALIGNANT NEOPLASM OF UPPER-OUTER QUADRANT OF RIGHT BREAST IN FEMALE, ESTROGEN RECEPTOR POSITIVE (HCC): ICD-10-CM

## 2022-01-07 DIAGNOSIS — C50.411 MALIGNANT NEOPLASM OF UPPER-OUTER QUADRANT OF RIGHT FEMALE BREAST, UNSPECIFIED ESTROGEN RECEPTOR STATUS (HCC): Primary | ICD-10-CM

## 2022-01-07 DIAGNOSIS — E89.89 LYMPHEDEMA OF UPPER EXTREMITY FOLLOWING LYMPHADENECTOMY: ICD-10-CM

## 2022-01-07 DIAGNOSIS — I89.0 LYMPHEDEMA OF UPPER EXTREMITY FOLLOWING LYMPHADENECTOMY: ICD-10-CM

## 2022-01-07 PROCEDURE — 3017F COLORECTAL CA SCREEN DOC REV: CPT | Performed by: INTERNAL MEDICINE

## 2022-01-07 PROCEDURE — G8427 DOCREV CUR MEDS BY ELIG CLIN: HCPCS | Performed by: INTERNAL MEDICINE

## 2022-01-07 PROCEDURE — 1090F PRES/ABSN URINE INCON ASSESS: CPT | Performed by: INTERNAL MEDICINE

## 2022-01-07 PROCEDURE — G0463 HOSPITAL OUTPT CLINIC VISIT: HCPCS | Performed by: INTERNAL MEDICINE

## 2022-01-07 PROCEDURE — G9899 SCRN MAM PERF RSLTS DOC: HCPCS | Performed by: INTERNAL MEDICINE

## 2022-01-07 PROCEDURE — G8417 CALC BMI ABV UP PARAM F/U: HCPCS | Performed by: INTERNAL MEDICINE

## 2022-01-07 PROCEDURE — G8400 PT W/DXA NO RESULTS DOC: HCPCS | Performed by: INTERNAL MEDICINE

## 2022-01-07 PROCEDURE — 1101F PT FALLS ASSESS-DOCD LE1/YR: CPT | Performed by: INTERNAL MEDICINE

## 2022-01-07 PROCEDURE — G8536 NO DOC ELDER MAL SCRN: HCPCS | Performed by: INTERNAL MEDICINE

## 2022-01-07 PROCEDURE — 99213 OFFICE O/P EST LOW 20 MIN: CPT | Performed by: INTERNAL MEDICINE

## 2022-01-07 PROCEDURE — G8510 SCR DEP NEG, NO PLAN REQD: HCPCS | Performed by: INTERNAL MEDICINE

## 2022-01-07 RX ORDER — LOSARTAN POTASSIUM 100 MG/1
TABLET ORAL
COMMUNITY
Start: 2021-12-01

## 2022-01-07 NOTE — PROGRESS NOTES
Hematology/Oncology  Progress Note    Name: Johnny Carlson  Date: 2022  : 1955    Zeenat Pierre DO     Ms. Sharon Kern is a 74y.o. year old female who was seen for management of her invasive ductal adenocarcinoma right and anemia     Current therapy: Femara 2.5mg PO daily and oral iron supplementation daily. Subjective:   Ms. Sharon Kern is a 74year-old woman who is currently taking Femara 2.5mg PO daily as long-term management for her invasive ductal carcinoma of the breast. She states she continues to tolerate the medication without any issues. The patient reports that she is doing her breast self-exam on a monthly basis.  Additionally, she continues to take the iron supplement once daily. She also report wearing her sleeve and compression garment for her lymphedema. She report that she is being followed by Nephrology for her CKD. today she Denied fever, chills, night sweat, unintentional weight loss, skin lumps or bumps, acute bleeding or bruising issues. No acute bleeding, blood in stool, dark stool, melena, hematochezia, hemoptysis, dark urine, or easily bruising. Denied headache, acute vision change, dizziness, chest pain, worsen shortness of breath, palpitation, productive cough, nausea, vomiting, abdominal pain, altered bowel habits, dysuria, new bone pain or back pain, focal numbness or weakness. .     Past medical history, family history, and social history: these were reviewed and remains unchanged.     Past Medical History:   Diagnosis Date    Arthritis     Asthma 2018    Resolved, per patient    Breast CA ([facility] [address] 2014    Right Breast  19  resolved with surgery    Controlled diabetes mellitus with chronic kidney disease ([address]     stage 3    GERD (gastroesophageal reflux disease) 2018    Resolved, per patient    Hiatus hernia syndrome     resolved, per pt.  8-6-18    Hypertension     Insulin pump in place     Microhematuria     Morbid obesity ([address] 2018 BMI = 47.4    Osteopenia     Postmastectomy lymphedema syndrome of right upper extremity 1/18/2020    Recurrent UTI     Staghorn calculus     Struvite kidney stones     Thyroid dysfunction     GOITER    Urethral diverticulum     Urine leukocytes      Past Surgical History:   Procedure Laterality Date    HX BREAST BIOPSY  10/18/10    stereotactic - right breast calcifications    HX BREAST BIOPSY  2/14/2014    RIGHT BREAST BIOPSY performed by Ria Robertson MD at [address] [place] [address] HX CYST REMOVAL  1997    Bilateral breast.     HX GI      colonoscopy    HX GYN      urethra reconstruction x3    HX HYSTERECTOMY      HX MODIFIED RADICAL MASTECTOMY  3/21/2014    RIGHT MODIFIED RADICAL MASTECTOMY SENTINAL LYMPH NODE BIOPSY performed by Ria Robertson MD at [facility] MAIN OR    HX TUBAL LIGATION      HX UROLOGICAL      4 diverticuli repaired    HX UROLOGICAL  03/29/2011    Cysto, percutaneous nephrolithotomy, antegrade URS, antegrade pyelogram.  Dr. Jules Moise, [facility].    HX UROLOGICAL  12/11/2009    Cysto, flexible URS, right RPG, lasertripsy, JJ stent placement. Dr. Jules Moise, [facility].    HX UROLOGICAL  11/20/2009    Second look right attempted percutaneous nephrolithotomy. Dr. Jules Moise, [facility].    HX UROLOGICAL  10/23/2009    Cysto, right percutaneous nephrolithotomy. Dr. Jules Moise, [facility].     UROLOGICAL  08/07/2018    Cysto, bilat RPG, right URS, HLL, right JJ stent placement, right nephrostomy tube removal, Dr. Janak Moss, [facility]    HX UROLOGICAL  09/04/2018    cysto, right JJ stent removal, bilat RPG. Dr Janak Moss. [id number].  HX UROLOGICAL  11/20/2018    cysto, bilat RPG, left URS, laser lithotripsy, left JJ stent place. Dr. Janak Moss. [address].   UROLOGICAL  01/25/2019    Left PCNL and removal of left JJ stent. Dr. Janak Moss at [address].  HX UROLOGICAL  07/31/2020    Cystoscopy, ureteroscopy, fluoroscopy, retrograde pyelogram, and exchange of a double-J stent 6 x 24.      HX UROLOGICAL  09/25/2020    Cystoscopy, ureteroscopy, nephrostomy tube removal, stent placement.  HX UROLOGICAL  09/23/2020    S/p Left PCNL    HX VASCULAR ACCESS  2014    IR GUIDE DIL URET TRACT EXIST INCL NEW ACCESS W TUBE PLACMENT LT  1/25/2019     Social History     Socioeconomic History    Marital status: SINGLE     Spouse name: Not on file    Number of children: Not on file    Years of education: Not on file    Highest education level: Not on file   Occupational History    Occupation:      Employer: [email or web address]   Tobacco Use    Smoking status: Never Smoker    Smokeless tobacco: Never Used   Vaping Use    Vaping Use: Never used   Substance and Sexual Activity    Alcohol use: Never     Comment: occasionally    Drug use: No    Sexual activity: Not on file   Other Topics Concern    Not on file   Social History Narrative    Not on file     Social Determinants of Health     Financial Resource Strain:     Difficulty of Paying Living Expenses: Not on file   Food Insecurity:     Worried About Running Out of Food in the Last Year: Not on file    Ankush of Food in the Last Year: Not on file   Transportation Needs:     Lack of Transportation (Medical): Not on file    Lack of Transportation (Non-Medical):  Not on file   Physical Activity:     Days of Exercise per Week: Not on file    Minutes of Exercise per Session: Not on file   Stress:     Feeling of Stress : Not on file   Social Connections:     Frequency of Communication with Friends and Family: Not on file    Frequency of Social Gatherings with Friends and Family: Not on file    Attends Voodoo Services: Not on file    Active Member of Clubs or Organizations: Not on file    Attends Club or Organization Meetings: Not on file    Marital Status: Not on file   Intimate Partner Violence:     Fear of Current or Ex-Partner: Not on file    Emotionally Abused: Not on file    Physically Abused: Not on file    Sexually Abused: Not on file   Housing Stability:     Unable to Pay for Housing in the Last Year: Not on file    Number of Places Lived in the Last Year: Not on file    Unstable Housing in the Last Year: Not on file     Family History   Problem Relation Age of Onset    Breast Cancer Sister 79    Diabetes Mother     Hypertension Mother     Breast Cancer Sister 39    Hypertension Other         Parent    Diabetes Other         parent    Stroke Other         parent    Hypertension Other         sibling    Diabetes Other         sibling    Osteoporosis Other         sibling    Breast Cancer Sister     Diabetes Sister     Diabetes Sister     Diabetes Sister      Current Outpatient Medications   Medication Sig Dispense Refill    losartan (COZAAR) 100 mg tablet       letrozole (FEMARA) 2.5 mg tablet Take 1 Tablet by mouth every evening. 90 Tablet 3    famotidine (PEPCID) 20 mg tablet       HumaLOG U-100 Insulin 100 unit/mL injection       cefdinir (OMNICEF) 300 mg capsule       omeprazole (PRILOSEC) 20 mg capsule       atorvastatin (LIPITOR) 40 mg tablet       PROLIA 60 mg/mL injection 60 mg every 6 months.  levothyroxine (SYNTHROID) 88 mcg tablet Take 88 mcg by mouth daily.  multivitamin (ONE A DAY) tablet Take 1 Tab by mouth daily.  losartan (COZAAR) 50 mg tablet Take 1 Tab by mouth daily. (Patient not taking: Reported on 1/7/2022)         Review of Systems   Constitutional: Negative. HENT: Negative. Eyes: Negative. Respiratory: Negative. Cardiovascular: Negative. Gastrointestinal: Negative. Genitourinary: Negative. Musculoskeletal: Negative. Skin: Negative. Neurological: Negative. Endo/Heme/Allergies: Negative. Psychiatric/Behavioral: Negative. Objective:     Visit Vitals  BP (!) 140/80   Pulse 95   Temp 98.9 °F (37.2 °C)   Ht 4' 11\" (1.499 m)   Wt 115.7 kg (255 lb)   SpO2 100%   BMI 51.50 kg/m²     ECOG PS0  Physical Exam  Constitutional:       Appearance: She is obese.    Cardiovascular:      Pulses: Normal pulses. Pulmonary:      Effort: Pulmonary effort is normal.   Chest:      Comments: Anterior chest wall and breast: Clinically there is no evidence of disease recurrence.  Examination of the chest wall and breast shows no mass, or abnormality.  The axilla reveals no palpable axillary lymphadenopathy.     Musculoskeletal:         General: Swelling present. Cervical back: Normal range of motion. Skin:     General: Skin is warm. Neurological:      Mental Status: She is alert and oriented to person, place, and time. Psychiatric:         Behavior: Behavior normal.          Lab data:      Results for orders placed or performed during the hospital encounter of 10/05/20   CBC WITH 3 PART DIFF     Status: Abnormal   Result Value Ref Range Status    WBC 11.9 4.6 - 13.2 K/uL Final    RBC 4.52 4.20 - 5.30 M/uL Final    HGB 12.0 12.0 - 16.0 g/dL Final    HCT 36.7 35.0 - 45.0 % Final    MCV 81.2 74.0 - 97.0 FL Final    MCH 26.5 24.0 - 34.0 PG Final    MCHC 32.7 31.0 - 37.0 g/dL Final    RDW 14.7 (H) 11.6 - 14.5 % Final    PLATELET 643 481 - 811 K/uL Final    DF AUTOMATED   Final           Assessment:     1. Malignant neoplasm of upper-outer quadrant of right female breast, unspecified estrogen receptor status ([address]    2. Long term current use of aromatase inhibitor    3. Malignant neoplasm of upper-outer quadrant of right breast in female, estrogen receptor positive ([address]    4. Lymphedema of upper extremity following lymphadenectomy          Plan:   Malignant neoplasm of upper-outer quadrant of right female breast, positive estrogen receptor   -- Patient was being followed by Dr. Portia Hawk who retired. -- Her Letrozole started on 08/08/2014. She was advised to continue Letrozole 2.5mg PO daily for full 10 years  -- 3/02/2021 Left Mammogram: No evidence of malignancy. Routine follow-up was recommended.   -- Clinically the patient is doing well and has no evidence of disease recurrence.   -- Today I have reviewed with the patient about recent lab reports on 12/8/2021        Plan:  -- The patient was instructed to continue doing her breast exams on a monthly basis. -- The patient was advised to notify us if any new breast pain, new palpable nodule, nipple inversion, spontaneous nipple discharge especially if bloody, breast skin changes, unintentional weight loss, worsen fatigue, new bone pain or back pain, or any concerns. -- I have advised her to follow up her PCP to continue age-appropriate cancer screening. -- I have educated her regarding lifestyle modifications, minimizing alcohol intake, refraining from smoking, healthy diet, and physical activity.        Long term use of aromatase inhibitor:   -- The patient currently taking Letrozole daily. She states she is tolerating the medication without any issue and this will be continued. -- the patient report that she is up to date with her DEXA scan from her GYN. I have advise her to follow up with PCP on checking vitamin D level.  + Advised on adequate amounts of calcium (at least 1,200 mg per day) and vitamin D (800-1,000 IU per day). The higher dose of vitamin D may be needed if vitamin D deficiency. + Recommend regular muscle-strengthening exercise to reduce the risk of falls and fractures. + Advise avoidance of tobacco smoking and excessive alcohol intake.        Lymphedema of upper extremity following lymphadenectomy  -- She continues to wear her sleeve, glove, and chest garment. -- We advised her to continue to avoid lifting more than 10 pounds using the right arm and hand.    -- She reports that her lymphedema will probably need therapy at athis time. We will refer to lymphedema clinic.        -- We will see the patient back in clinic in about 6 months. Always sooner if required. The patient can have lab done prior our next clinic visit.        Orders Placed This Encounter    losartan (COZAAR) 100 mg tablet       Pat Rodgers MD  1/7/2022      Please note:  This document has been produced using voice recognition software. Unrecognized errors in transcription may be present.       CC: Rehan Cruz, DO

## 2022-01-10 PROBLEM — E53.9 VITAMIN B DEFICIENCY: Status: ACTIVE | Noted: 2018-08-24

## 2022-01-10 PROBLEM — N39.0 RECURRENT UTI: Status: ACTIVE | Noted: 2022-01-10

## 2022-01-10 PROBLEM — A49.9 BACTERIAL INFECTION: Status: ACTIVE | Noted: 2020-01-31

## 2022-02-24 ENCOUNTER — TRANSCRIBE ORDER (OUTPATIENT)
Dept: SCHEDULING | Age: 67
End: 2022-02-24

## 2022-02-24 DIAGNOSIS — Z12.31 VISIT FOR SCREENING MAMMOGRAM: Primary | ICD-10-CM

## 2022-03-04 ENCOUNTER — HOSPITAL ENCOUNTER (OUTPATIENT)
Dept: MAMMOGRAPHY | Age: 67
Discharge: HOME | End: 2022-03-04
Attending: INTERNAL MEDICINE
Payer: MEDICARE

## 2022-03-04 DIAGNOSIS — Z12.31 VISIT FOR SCREENING MAMMOGRAM: ICD-10-CM

## 2022-03-04 PROCEDURE — 77063 BREAST TOMOSYNTHESIS BI: CPT

## 2022-03-18 PROBLEM — A49.9 BACTERIAL INFECTION: Status: ACTIVE | Noted: 2020-01-31

## 2022-03-19 PROBLEM — N20.0 STAGHORN RENAL CALCULUS: Status: ACTIVE | Noted: 2020-01-18

## 2022-03-19 PROBLEM — N39.0 RECURRENT UTI: Status: ACTIVE | Noted: 2022-01-10

## 2022-03-19 PROBLEM — I97.2 POSTMASTECTOMY LYMPHEDEMA SYNDROME OF RIGHT UPPER EXTREMITY: Status: ACTIVE | Noted: 2020-01-18

## 2022-03-19 PROBLEM — E53.9 VITAMIN B DEFICIENCY: Status: ACTIVE | Noted: 2018-08-24

## 2022-03-19 PROBLEM — N17.9 ACUTE RENAL FAILURE SUPERIMPOSED ON STAGE 3 CHRONIC KIDNEY DISEASE (HCC): Status: ACTIVE | Noted: 2020-01-18

## 2022-03-19 PROBLEM — E66.01 MORBID OBESITY (HCC): Status: ACTIVE | Noted: 2018-02-05

## 2022-03-19 PROBLEM — A41.9 SEPSIS (HCC): Status: ACTIVE | Noted: 2020-01-18

## 2022-03-19 PROBLEM — N18.30 ACUTE RENAL FAILURE SUPERIMPOSED ON STAGE 3 CHRONIC KIDNEY DISEASE (HCC): Status: ACTIVE | Noted: 2020-01-18

## 2022-03-20 PROBLEM — Z96.41 INSULIN PUMP STATUS: Status: ACTIVE | Noted: 2020-01-19

## 2022-06-23 ENCOUNTER — APPOINTMENT (OUTPATIENT)
Dept: ONCOLOGY | Age: 67
End: 2022-06-23

## 2022-06-23 ENCOUNTER — HOSPITAL ENCOUNTER (OUTPATIENT)
Dept: LAB | Age: 67
Discharge: HOME | End: 2022-06-23
Payer: MEDICARE

## 2022-06-23 DIAGNOSIS — C50.411 MALIGNANT NEOPLASM OF UPPER-OUTER QUADRANT OF RIGHT BREAST IN FEMALE, ESTROGEN RECEPTOR POSITIVE (HCC): ICD-10-CM

## 2022-06-23 DIAGNOSIS — Z17.0 MALIGNANT NEOPLASM OF UPPER-OUTER QUADRANT OF RIGHT BREAST IN FEMALE, ESTROGEN RECEPTOR POSITIVE (HCC): ICD-10-CM

## 2022-06-23 DIAGNOSIS — C50.411 MALIGNANT NEOPLASM OF UPPER-OUTER QUADRANT OF RIGHT FEMALE BREAST, UNSPECIFIED ESTROGEN RECEPTOR STATUS (HCC): ICD-10-CM

## 2022-06-23 DIAGNOSIS — I89.0 LYMPHEDEMA OF UPPER EXTREMITY FOLLOWING LYMPHADENECTOMY: ICD-10-CM

## 2022-06-23 DIAGNOSIS — Z79.811 LONG TERM CURRENT USE OF AROMATASE INHIBITOR: ICD-10-CM

## 2022-06-23 DIAGNOSIS — E89.89 LYMPHEDEMA OF UPPER EXTREMITY FOLLOWING LYMPHADENECTOMY: ICD-10-CM

## 2022-06-23 LAB
ALBUMIN SERPL-MCNC: 3.2 G/DL (ref 3.4–5)
ALBUMIN/GLOB SERPL: 0.8 {RATIO} (ref 0.8–1.7)
ALP SERPL-CCNC: 195 U/L (ref 45–117)
ALT SERPL-CCNC: 20 U/L (ref 13–56)
ANION GAP SERPL CALC-SCNC: 5 MMOL/L (ref 3–18)
AST SERPL-CCNC: 14 U/L (ref 10–38)
BASOPHILS # BLD: 0.1 K/UL (ref 0–0.1)
BASOPHILS NFR BLD: 1 % (ref 0–2)
BILIRUB SERPL-MCNC: 0.4 MG/DL (ref 0.2–1)
BUN SERPL-MCNC: 20 MG/DL (ref 7–18)
BUN/CREAT SERPL: 14 (ref 12–20)
CALCIUM SERPL-MCNC: 9 MG/DL (ref 8.5–10.1)
CHLORIDE SERPL-SCNC: 111 MMOL/L (ref 100–111)
CO2 SERPL-SCNC: 27 MMOL/L (ref 21–32)
CREAT SERPL-MCNC: 1.47 MG/DL (ref 0.6–1.3)
DIFFERENTIAL METHOD BLD: NORMAL
EOSINOPHIL # BLD: 0.3 K/UL (ref 0–0.4)
EOSINOPHIL NFR BLD: 3 % (ref 0–5)
ERYTHROCYTE [DISTWIDTH] IN BLOOD BY AUTOMATED COUNT: 14.4 % (ref 11.6–14.5)
GLOBULIN SER CALC-MCNC: 4 G/DL (ref 2–4)
GLUCOSE SERPL-MCNC: 237 MG/DL (ref 74–99)
HCT VFR BLD AUTO: 39.7 % (ref 35–45)
HGB BLD-MCNC: 12.3 G/DL (ref 12–16)
IMM GRANULOCYTES # BLD AUTO: 0 K/UL (ref 0–0.04)
IMM GRANULOCYTES NFR BLD AUTO: 0 % (ref 0–0.5)
LYMPHOCYTES # BLD: 2.2 K/UL (ref 0.9–3.6)
LYMPHOCYTES NFR BLD: 22 % (ref 21–52)
MCH RBC QN AUTO: 25.4 PG (ref 24–34)
MCHC RBC AUTO-ENTMCNC: 31 G/DL (ref 31–37)
MCV RBC AUTO: 82 FL (ref 78–100)
MONOCYTES # BLD: 0.6 K/UL (ref 0.05–1.2)
MONOCYTES NFR BLD: 6 % (ref 3–10)
NEUTS SEG # BLD: 7 K/UL (ref 1.8–8)
NEUTS SEG NFR BLD: 69 % (ref 40–73)
NRBC # BLD: 0 K/UL (ref 0–0.01)
NRBC BLD-RTO: 0 PER 100 WBC
PLATELET # BLD AUTO: 304 K/UL (ref 135–420)
PMV BLD AUTO: 9.7 FL (ref 9.2–11.8)
POTASSIUM SERPL-SCNC: 4.8 MMOL/L (ref 3.5–5.5)
PROT SERPL-MCNC: 7.2 G/DL (ref 6.4–8.2)
RBC # BLD AUTO: 4.84 M/UL (ref 4.2–5.3)
SODIUM SERPL-SCNC: 143 MMOL/L (ref 136–145)
WBC # BLD AUTO: 10.2 K/UL (ref 4.6–13.2)

## 2022-06-23 PROCEDURE — 86300 IMMUNOASSAY TUMOR CA 15-3: CPT

## 2022-06-23 PROCEDURE — 85025 COMPLETE CBC W/AUTO DIFF WBC: CPT

## 2022-06-23 PROCEDURE — 36415 COLL VENOUS BLD VENIPUNCTURE: CPT

## 2022-06-23 PROCEDURE — 80053 COMPREHEN METABOLIC PANEL: CPT

## 2022-06-25 LAB — CANCER AG27-29 SERPL-ACNC: 24.1 U/ML (ref 0–38.6)

## 2022-07-07 ENCOUNTER — OFFICE VISIT (OUTPATIENT)
Dept: ONCOLOGY | Age: 67
End: 2022-07-07
Payer: MEDICARE

## 2022-07-07 VITALS
HEART RATE: 90 BPM | DIASTOLIC BLOOD PRESSURE: 79 MMHG | HEIGHT: 59 IN | RESPIRATION RATE: 18 BRPM | WEIGHT: 245 LBS | SYSTOLIC BLOOD PRESSURE: 135 MMHG | BODY MASS INDEX: 49.39 KG/M2 | OXYGEN SATURATION: 99 %

## 2022-07-07 DIAGNOSIS — Z17.0 MALIGNANT NEOPLASM OF UPPER-OUTER QUADRANT OF RIGHT BREAST IN FEMALE, ESTROGEN RECEPTOR POSITIVE (HCC): Primary | ICD-10-CM

## 2022-07-07 DIAGNOSIS — C50.411 MALIGNANT NEOPLASM OF UPPER-OUTER QUADRANT OF RIGHT FEMALE BREAST, UNSPECIFIED ESTROGEN RECEPTOR STATUS (HCC): ICD-10-CM

## 2022-07-07 DIAGNOSIS — I89.0 LYMPHEDEMA OF UPPER EXTREMITY FOLLOWING LYMPHADENECTOMY: ICD-10-CM

## 2022-07-07 DIAGNOSIS — Z79.811 LONG TERM CURRENT USE OF AROMATASE INHIBITOR: ICD-10-CM

## 2022-07-07 DIAGNOSIS — E89.89 LYMPHEDEMA OF UPPER EXTREMITY FOLLOWING LYMPHADENECTOMY: ICD-10-CM

## 2022-07-07 DIAGNOSIS — C50.411 MALIGNANT NEOPLASM OF UPPER-OUTER QUADRANT OF RIGHT BREAST IN FEMALE, ESTROGEN RECEPTOR POSITIVE (HCC): Primary | ICD-10-CM

## 2022-07-07 PROCEDURE — 99213 OFFICE O/P EST LOW 20 MIN: CPT | Performed by: INTERNAL MEDICINE

## 2022-07-07 PROCEDURE — G9899 SCRN MAM PERF RSLTS DOC: HCPCS | Performed by: INTERNAL MEDICINE

## 2022-07-07 PROCEDURE — G8536 NO DOC ELDER MAL SCRN: HCPCS | Performed by: INTERNAL MEDICINE

## 2022-07-07 PROCEDURE — G8417 CALC BMI ABV UP PARAM F/U: HCPCS | Performed by: INTERNAL MEDICINE

## 2022-07-07 PROCEDURE — G8754 DIAS BP LESS 90: HCPCS | Performed by: INTERNAL MEDICINE

## 2022-07-07 PROCEDURE — 3017F COLORECTAL CA SCREEN DOC REV: CPT | Performed by: INTERNAL MEDICINE

## 2022-07-07 PROCEDURE — G8752 SYS BP LESS 140: HCPCS | Performed by: INTERNAL MEDICINE

## 2022-07-07 PROCEDURE — 1101F PT FALLS ASSESS-DOCD LE1/YR: CPT | Performed by: INTERNAL MEDICINE

## 2022-07-07 PROCEDURE — G0463 HOSPITAL OUTPT CLINIC VISIT: HCPCS | Performed by: INTERNAL MEDICINE

## 2022-07-07 PROCEDURE — 1123F ACP DISCUSS/DSCN MKR DOCD: CPT | Performed by: INTERNAL MEDICINE

## 2022-07-07 PROCEDURE — G8432 DEP SCR NOT DOC, RNG: HCPCS | Performed by: INTERNAL MEDICINE

## 2022-07-07 PROCEDURE — G8427 DOCREV CUR MEDS BY ELIG CLIN: HCPCS | Performed by: INTERNAL MEDICINE

## 2022-07-07 PROCEDURE — 1090F PRES/ABSN URINE INCON ASSESS: CPT | Performed by: INTERNAL MEDICINE

## 2022-07-07 PROCEDURE — G8400 PT W/DXA NO RESULTS DOC: HCPCS | Performed by: INTERNAL MEDICINE

## 2022-07-07 RX ORDER — LETROZOLE 2.5 MG/1
2.5 TABLET, FILM COATED ORAL EVERY EVENING
Qty: 90 TABLET | Refills: 3 | Status: SHIPPED | OUTPATIENT
Start: 2022-07-07 | End: 2022-10-11

## 2022-07-07 NOTE — PROGRESS NOTES
Hematology/Oncology  Progress Note    Name: Maren Shin  Date: 2022  : 1955    Mo Vogt DO     Ms. Stanislav Golden is a 69y.o. year old female who was seen for management of her invasive ductal adenocarcinoma right and anemia     Current therapy: Femara 2.5mg PO daily and oral iron supplementation daily. Subjective:   Ms. Stanislav Golden is a 74year-old woman who is currently taking Femara 2.5mg PO daily as long-term management for her invasive ductal carcinoma of the breast. She states she continues to tolerate the medication without any issues. She states that she t wearing her sleeve and compression garment for her lymphedema on her right upper extremities. She report that she is being followed by Nephrology for her CKD. Today she denies fever, chills, night sweat, unintentional weight loss, skin lumps or bumps, acute bleeding or bruising issues. No acute bleeding, blood in stool, dark stool, melena, hematochezia, hemoptysis, dark urine, or easily bruising. Denied headache, acute vision change, dizziness, chest pain, shortness of breath, palpitation, productive cough, nausea, vomiting, abdominal pain, altered bowel habits, dysuria, new bone pain or back pain, focal numbness or weakness, chest pain or breast discomfort . Kristian Tomlin Past medical history, family history, and social history: these were reviewed and remains unchanged.     Past Medical History:   Diagnosis Date    Arthritis     Asthma 2018    Resolved, per patient    Breast CA ([facility] [address] 2014    Right Breast  19  resolved with surgery    Controlled diabetes mellitus with chronic kidney disease ([address]     stage 3    GERD (gastroesophageal reflux disease) 2018    Resolved, per patient    Hiatus hernia syndrome     resolved, per pt.  8-6-18    Hypertension     Insulin pump in place     Microhematuria     Morbid obesity ([address] 2018    BMI = 47.4    Osteopenia     Postmastectomy lymphedema syndrome of right upper extremity 1/18/2020    Recurrent UTI     Staghorn calculus     Struvite kidney stones     Thyroid dysfunction     GOITER    Urethral diverticulum     Urine leukocytes      Past Surgical History:   Procedure Laterality Date    HX BREAST BIOPSY  10/18/10    stereotactic - right breast calcifications    HX BREAST BIOPSY  2/14/2014    RIGHT BREAST BIOPSY performed by Jesse Link MD at [address] [place] [address] HX CYST REMOVAL  1997    Bilateral breast.     HX GI      colonoscopy    HX GYN      urethra reconstruction x3    HX HYSTERECTOMY      HX MODIFIED RADICAL MASTECTOMY  3/21/2014    RIGHT MODIFIED RADICAL MASTECTOMY SENTINAL LYMPH NODE BIOPSY performed by Jesse Link MD at [facility] MAIN OR    HX TUBAL LIGATION      HX UROLOGICAL      4 diverticuli repaired    HX UROLOGICAL  03/29/2011    Cysto, percutaneous nephrolithotomy, antegrade URS, antegrade pyelogram.  Dr. Matty Jamison, [facility].     UROLOGICAL  12/11/2009    Cysto, flexible URS, right RPG, lasertripsy, JJ stent placement. Dr. Matty Jamison, [facility].     UROLOGICAL  11/20/2009    Second look right attempted percutaneous nephrolithotomy. Dr. Matty Jamison, [facility].     UROLOGICAL  10/23/2009    Cysto, right percutaneous nephrolithotomy. Dr. Matty Jamison, [facility].     UROLOGICAL  08/07/2018    Cysto, bilat RPG, right URS, HLL, right JJ stent placement, right nephrostomy tube removal, Dr. Ricardo Aggarwal, [facility] UROLOGICAL  09/04/2018    cysto, right JJ stent removal, bilat RPG. Dr Ricardo Aggarwal. [facility].   UROLOGICAL  11/20/2018    cysto, bilat RPG, left URS, laser lithotripsy, left JJ stent place. Dr. Ricardo Aggarwal. [facility].   UROLOGICAL  01/25/2019    Left PCNL and removal of left JJ stent. Dr. Ricardo Aggarwal at [facility].   UROLOGICAL  07/31/2020    Cystoscopy, ureteroscopy, fluoroscopy, retrograde pyelogram, and exchange of a double-J stent 6 x 24.   UROLOGICAL  09/25/2020    Cystoscopy, ureteroscopy, nephrostomy tube removal, stent placement.      HX UROLOGICAL  09/23/2020    S/p Left PCNL    HX VASCULAR ACCESS  2014    IR GUIDE DIL URET TRACT EXIST INCL NEW ACCESS W TUBE PLACMENT LT  1/25/2019     Social History     Socioeconomic History    Marital status: SINGLE     Spouse name: Not on file    Number of children: Not on file    Years of education: Not on file    Highest education level: Not on file   Occupational History    Occupation:      Employer: [employer]   Tobacco Use    Smoking status: Never Smoker    Smokeless tobacco: Never Used   Vaping Use    Vaping Use: Never used   Substance and Sexual Activity    Alcohol use: Never     Comment: occasionally    Drug use: No    Sexual activity: Not on file   Other Topics Concern    Not on file   Social History Narrative    Not on file     Social Determinants of Health     Financial Resource Strain:     Difficulty of Paying Living Expenses: Not on file   Food Insecurity:     Worried About Running Out of Food in the Last Year: Not on file    Ankush of Food in the Last Year: Not on file   Transportation Needs:     Lack of Transportation (Medical): Not on file    Lack of Transportation (Non-Medical):  Not on file   Physical Activity:     Days of Exercise per Week: Not on file    Minutes of Exercise per Session: Not on file   Stress:     Feeling of Stress : Not on file   Social Connections:     Frequency of Communication with Friends and Family: Not on file    Frequency of Social Gatherings with Friends and Family: Not on file    Attends Mu-ism Services: Not on file    Active Member of Clubs or Organizations: Not on file    Attends Club or Organization Meetings: Not on file    Marital Status: Not on file   Intimate Partner Violence:     Fear of Current or Ex-Partner: Not on file    Emotionally Abused: Not on file    Physically Abused: Not on file    Sexually Abused: Not on file   Housing Stability:     Unable to Pay for Housing in the Last Year: Not on file    Number of [place] in the Last Year: Not on file    Unstable Housing in the Last Year: Not on file     Family History   Problem Relation Age of Onset    Breast Cancer Sister 79    Diabetes Mother     Hypertension Mother     Breast Cancer Sister 39    Hypertension Other         Parent    Diabetes Other         parent    Stroke Other         parent    Hypertension Other         sibling    Diabetes Other         sibling    Osteoporosis Other         sibling    Breast Cancer Sister     Diabetes Sister     Diabetes Sister     Diabetes Sister      Current Outpatient Medications   Medication Sig Dispense Refill    letrozole (FEMARA) 2.5 mg tablet Take 1 Tablet by mouth every evening. 90 Tablet 3    losartan (COZAAR) 100 mg tablet       famotidine (PEPCID) 20 mg tablet       HumaLOG U-100 Insulin 100 unit/mL injection       cefdinir (OMNICEF) 300 mg capsule       omeprazole (PRILOSEC) 20 mg capsule       atorvastatin (LIPITOR) 40 mg tablet       PROLIA 60 mg/mL injection 60 mg every 6 months.  levothyroxine (SYNTHROID) 88 mcg tablet Take 88 mcg by mouth daily.  multivitamin (ONE A DAY) tablet Take 1 Tab by mouth daily. Review of Systems   Constitutional: Negative. HENT: Negative. Eyes: Negative. Respiratory: Negative. Cardiovascular: Negative. Gastrointestinal: Negative. Genitourinary: Negative. Musculoskeletal: Negative. Skin: Negative. Neurological: Negative. Endo/Heme/Allergies: Negative. Psychiatric/Behavioral: Negative. Objective:     Visit Vitals  /79   Pulse 90   Resp 18   Ht 4' 11\" (1.499 m)   Wt 111.1 kg (245 lb)   SpO2 99%   BMI 49.48 kg/m²     ECOG PS0  Physical Exam  Constitutional:       Appearance: She is obese. Cardiovascular:      Pulses: Normal pulses.    Pulmonary:      Effort: Pulmonary effort is normal.   Chest:      Comments: Anterior chest wall and breast: Clinically there is no evidence of disease recurrence.  Examination of the chest wall and breast shows no mass, or abnormality.  The axilla reveals no palpable axillary lymphadenopathy. Right breast is surgically remove      Musculoskeletal:         General: Swelling present. Cervical back: Normal range of motion. Skin:     General: Skin is warm. Neurological:      Mental Status: She is alert and oriented to person, place, and time. Psychiatric:         Behavior: Behavior normal.          Lab data:      Results for orders placed or performed during the hospital encounter of 10/05/20   CBC WITH 3 PART DIFF     Status: Abnormal   Result Value Ref Range Status    WBC 11.9 4.6 - 13.2 K/uL Final    RBC 4.52 4.20 - 5.30 M/uL Final    HGB 12.0 12.0 - 16.0 g/dL Final    HCT 36.7 35.0 - 45.0 % Final    MCV 81.2 74.0 - 97.0 FL Final    MCH 26.5 24.0 - 34.0 PG Final    MCHC 32.7 31.0 - 37.0 g/dL Final    RDW 14.7 (H) 11.6 - 14.5 % Final    PLATELET 079 919 - 897 K/uL Final    DF AUTOMATED   Final           Assessment:     1. Malignant neoplasm of upper-outer quadrant of right female breast, unspecified estrogen receptor status ([facility] [address]    2. Long term current use of aromatase inhibitor          Plan:   Malignant neoplasm of upper-outer quadrant of right female breast, positive estrogen receptor   -- Patient was being followed by Dr. Mason Bain who retired. -- Her Letrozole started on 08/08/2014. She was advised to continue Letrozole 2.5mg PO daily for full 10 years  -- 3/04/2022  Left Mammogram: No evidence of malignancy. Routine follow-up was recommended. -- Clinically the patient is doing well and has no evidence of disease recurrence.   -- Recent labs on 6/23/2022 reviewed with patient today      Plan:  -- The patient was instructed to continue doing her breast exams on a monthly basis.   -- The patient was advised to notify us if any new breast pain, new palpable nodule, nipple inversion, spontaneous nipple discharge especially if bloody, breast skin changes, unintentional weight loss, worsen fatigue, new bone pain or back pain, or any concerns. -- I have advised her to follow up with her PCP to continue age-appropriate cancer screening. -- I have educated her regarding lifestyle modifications, minimizing alcohol intake, refraining from smoking, healthy diet, and physical activity.        Long term use of aromatase inhibitor:   -- The patient currently taking Letrozole daily. She states she is tolerating the medication without any issue and this will be continued. -- the patient report that she is up to date with her DEXA scan from her GYN. I have advise her to follow up with PCP on checking vitamin D level.  + Advised on adequate amounts of calcium (at least 1,200 mg per day) and vitamin D (800-1,000 IU per day). The higher dose of vitamin D may be needed if vitamin D deficiency. + Recommend regular muscle-strengthening exercise to reduce the risk of falls and fractures. + Advise avoidance of tobacco smoking and excessive alcohol intake.        Lymphedema of upper extremity following lymphadenectomy (right )  -- She continues to wear her sleeve, glove, and chest garment. -- We advised her to continue to avoid lifting more than 10 pounds using the right arm and hand.          -- We will see the patient back in clinic in about 6 months. Always sooner if required.          Orders Placed This Encounter    letrozole (FEMARA) 2.5 mg tablet     Sig: Take 1 Tablet by mouth every evening. Dispense:  90 Tablet     Refill:  3       Nallely Mistry MD  7/7/2022      Please note: This document has been produced using voice recognition software. Unrecognized errors in transcription may be present.       CC: Cuca Seo DO

## 2022-07-25 ENCOUNTER — HOSPITAL ENCOUNTER (OUTPATIENT)
Dept: LAB | Age: 67
Discharge: HOME | End: 2022-07-25
Payer: MEDICARE

## 2022-07-25 ENCOUNTER — TRANSCRIBE ORDER (OUTPATIENT)
Dept: REGISTRATION | Age: 67
End: 2022-07-25

## 2022-07-25 DIAGNOSIS — E11.9 DIABETES MELLITUS (HCC): Primary | ICD-10-CM

## 2022-07-25 DIAGNOSIS — E11.9 DIABETES MELLITUS (HCC): ICD-10-CM

## 2022-07-25 DIAGNOSIS — N18.32 STAGE 3B CHRONIC KIDNEY DISEASE (HCC): ICD-10-CM

## 2022-07-25 DIAGNOSIS — I10 HTN (HYPERTENSION): ICD-10-CM

## 2022-07-25 LAB
ALBUMIN SERPL-MCNC: 3.3 G/DL (ref 3.4–5)
ANION GAP SERPL CALC-SCNC: 8 MMOL/L (ref 3–18)
BUN SERPL-MCNC: 22 MG/DL (ref 7–18)
BUN/CREAT SERPL: 13 (ref 12–20)
CALCIUM SERPL-MCNC: 8.9 MG/DL (ref 8.5–10.1)
CALCIUM SERPL-MCNC: 9 MG/DL (ref 8.5–10.1)
CHLORIDE SERPL-SCNC: 110 MMOL/L (ref 100–111)
CO2 SERPL-SCNC: 23 MMOL/L (ref 21–32)
CREAT SERPL-MCNC: 1.68 MG/DL (ref 0.6–1.3)
CREAT UR-MCNC: 150 MG/DL (ref 30–125)
CREAT UR-MCNC: 150 MG/DL (ref 30–125)
ERYTHROCYTE [DISTWIDTH] IN BLOOD BY AUTOMATED COUNT: 14.4 % (ref 11.6–14.5)
GLUCOSE SERPL-MCNC: 249 MG/DL (ref 74–99)
HCT VFR BLD AUTO: 40.6 % (ref 35–45)
HGB BLD-MCNC: 13 G/DL (ref 12–16)
MCH RBC QN AUTO: 25.4 PG (ref 24–34)
MCHC RBC AUTO-ENTMCNC: 32 G/DL (ref 31–37)
MCV RBC AUTO: 79.3 FL (ref 78–100)
MICROALBUMIN UR-MCNC: 94.1 MG/DL (ref 0–3)
MICROALBUMIN/CREAT UR-RTO: 627 MG/G (ref 0–30)
NRBC # BLD: 0 K/UL (ref 0–0.01)
NRBC BLD-RTO: 0 PER 100 WBC
PHOSPHATE SERPL-MCNC: 4.3 MG/DL (ref 2.5–4.9)
PLATELET # BLD AUTO: 331 K/UL (ref 135–420)
PMV BLD AUTO: 9.4 FL (ref 9.2–11.8)
POTASSIUM SERPL-SCNC: 4.4 MMOL/L (ref 3.5–5.5)
PROT UR-MCNC: 160 MG/DL
PTH-INTACT SERPL-MCNC: 79.6 PG/ML (ref 18.4–88)
RBC # BLD AUTO: 5.12 M/UL (ref 4.2–5.3)
SODIUM SERPL-SCNC: 141 MMOL/L (ref 136–145)
WBC # BLD AUTO: 8.8 K/UL (ref 4.6–13.2)

## 2022-07-25 PROCEDURE — 80069 RENAL FUNCTION PANEL: CPT

## 2022-07-25 PROCEDURE — 82570 ASSAY OF URINE CREATININE: CPT

## 2022-07-25 PROCEDURE — 83970 ASSAY OF PARATHORMONE: CPT

## 2022-07-25 PROCEDURE — 36415 COLL VENOUS BLD VENIPUNCTURE: CPT

## 2022-07-25 PROCEDURE — 85027 COMPLETE CBC AUTOMATED: CPT

## 2022-07-25 PROCEDURE — 82043 UR ALBUMIN QUANTITATIVE: CPT

## 2022-07-25 PROCEDURE — 84156 ASSAY OF PROTEIN URINE: CPT

## 2022-08-03 ENCOUNTER — DOCUMENTATION ONLY (OUTPATIENT)
Dept: NEPHROLOGY | Age: 67
End: 2022-08-03

## 2022-08-03 NOTE — PROGRESS NOTES
Bk Grimes  Appointment: 2022 2:15 PM  Location: [facility] Office  Patient #: [id number]  : 1955  Undefined / Language: [place] / Race: Black or   Female      History of Present Illness Ok Bauman MD; 8/3/2022 6:32 AM)  The patient is a 69year old female who presents for a Recheck of Chronic kidney disease. This is classified as stage 3. Note: Patient is a 69-year-old -American female who has been referred from Dr. Sonu San office for evaluation of chronic kidney disease and elevated creatinine. Past medical history    #1 history of diabetes mellitus for the last 30 years, presently hemoglobin A1c of 6.5  #2 hypertension, longstanding for more than 30 years  #3 nephrolithiasis  #4 frequent urinary tract infection  #5 right-sided atrophic kidney  #6 history of CA breast  #7 hypothyroidism    Patient has diabetes and hypertension for a long time. They have been relatively well controlled recently. She has had problems with kidney stones UTIs as well as history of obstructive uropathy in the past.  On recent CAT scan noted to have atrophic right-sided kidney. She does follow with urology. Interval history:  Denies any symptoms  Diabetes mellitus not well controlled, hemoglobin A1c per patient in the 8 range  Blood pressure 130/80 which is much better controlled after starting losartan  Patient tolerating losartan very well.   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 NSAIDs    Allergies Reconciled      Family History Aleta Jack; 8/2/2022 2:17 PM)  FH POSITIVE FOR BREAST CANCER, DM, HTN,      Social History Aleta Jack; 8/2/2022 2:17 PM)  Non Drinker/No Alcohol Use    No Drug Use    Tobacco use   Never smoker. Medication History Aleta Jack; 8/2/2022 2:42 PM)  Losartan Potassium  (100MG Tablet, 1 Oral at bedtime, Taken starting 12/01/2021) Active. Omnicef  (300MG Capsule, AS Oral DIRECTED) Active. Famotidine  (20MG Tablet, 1 Oral Daily) Active. Femara  (2.5MG Tablet, 1 Oral EVERY EVENING) Active. Atorvastatin Calcium  (40MG Tablet, 1 Oral daily) Active. Prolia  (60MG/ML Solution, 60 mg Subcutaneous every six months) Active. One-A-Day Within  (1 Oral daily) Active. Medications Reconciled     Past Surgical History Aleta Jack; 8/2/2022 2:17 PM)  URETERAL STENTING    NEPHROSTOMY TUBES PLACED AND REMOVED SEVERAL TIMES    HYSTERECTOMY    BREAST SURGERY   MODIFIED RADICAL MASTECTOMY  URETHRAL DIVERTICULI REPAIR-X 4      Health Maintenance History (Aletajustin Jack; 8/2/2022 2:44 PM)  Flu Vaccine   [12/2021]:  Pneumovax   Refused. Colonoscopy, Screening   [08/2021]: did the cologuard good for 3 years  Mammogram, Screening   [03/21/2021]: Within Normal Limits. OSNEZ12 vaccine dates pfizer: 03/25/2021, 4/15/2021, 12/13/202, 7/15/2022          Review of Systems Tashi Ace MD; 8/3/2022 6:32 AM)  General Not Present- Anorexia, Chills, Fatigue and Fever. Skin Not Present- Bruising, Pruritus, Rash and Ulcer. HEENT Not Present- Dry Mucous Membranes, Dysgeusia, Oral Ulcers, Periorbital Puffiness and Sore Throat. Respiratory Not Present- Cough, Difficulty Breathing on Exertion, Dyspnea and Hemoptysis.   Cardiovascular Not Present- Chest Pain, Claudications, Orthopnea, Palpitations, Paroxysmal Nocturnal Dyspnea and Swelling of Extremities. Gastrointestinal Not Present- Abdominal Pain, Abdominal Swelling, Constipation, Diarrhea, Hematochezia, Melena, Nausea and Vomiting. Female Genitourinary Not Present- Blood in Urine, Difficulty Emptying Bladder, Dysuria, Frequency, Hematuria and Nocturia. Musculoskeletal Not Present- Joint Pain, Joint Redness, Joint Stiffness, Joint Swelling, Leg Cramps and Myalgia. Neurological Not Present- Dizziness, Headaches, Syncope and Trouble walking. Endocrine Not Present- Appetite Changes, Excessive Thirst, Polydipsia and Polyuria. Hematology Not Present- Easy Bruising and Excessive bleeding. Vitals Sarah Crooks; 8/2/2022 2:45 PM)  8/2/2022 2:44 PM  Weight: 245 lb   Height: 59 in   Height was reported by patient. Body Surface Area: 2.01 m²   Body Mass Index: 49.48 kg/m²    Pain Level: 4/10    Temp.: 96.8° F    Pulse: 63 (Regular)    Resp. : 14 (Unlabored)    P. OX: 98% (Room air)  BP: 130/80(Sitting, Left Arm, Standard)  pain is from having a headache today            Physical Exam Victorino Ruiz MD; 8/3/2022 6:32 AM)  General  General Appearance - Not in acute distress. Build & Nutrition - Well nourished and Well developed. Integumentary  General Characteristics  Overall examination of the patient's skin reveals - no rashes. Eye  Sclera/Conjunctiva - Bilateral - Nonicteric. ENMT  Mouth and Throat  Oral Cavity/Oropharynx - Gingiva - no ulcerations noted, No excessive growth of soft tissue present. Oral Mucosa - moist. Oropharynx - No presence of white exudate noted. Chest and Lung Exam  Auscultation  Breath sounds - Normal and Clear. Adventitious sounds - No Adventitious sounds.     Cardiovascular  Cardiovascular examination reveals  - normal heart sounds, regular rate and rhythm with no murmurs and no digital clubbing, cyanosis, edema, increased warmth or tenderness. Abdomen  Inspection  Inspection of the abdomen reveals - No Abnormal pulsations. Palpation/Percussion  Palpation and Percussion of the abdomen reveal - Soft, Non Tender, No hepatosplenomegaly and No Palpable abdominal masses. Auscultation  Auscultation of the abdomen reveals - Bowel sounds normal and No Abdominal bruits. Neurologic  Neurologic evaluation reveals  - alert and oriented x 3 with no impairment of recent or remote memory. Lymphatic  General Lymphatics  Description - No Cervical lymphadeopathy. Assessment & Plan Tom Vera MD; 8/3/2022 6:35 AM)  Chronic kidney disease, stage 3b (N18.32) <ECT974>  Impression: . #1 chronic kidney disease stage IIIb, etiology appears ,diabetic nephropathy/hypertension nephrosclerosis /rt sided atrophic kidney, She does have proteinuria which is sub nephrotic. Blood pressure appears to be well controlled at home. She is on losartan which she is tolerating  #2 hypertension, goal was discussed with patient, keep blood pressure less than 130/80. increase losartan.  Advised patient to keep a record of her blood pressures  #3 diabetes mellitus with diabetic nephropathy, keep hemoglobin A1c of 7 or below  #4 proteinuria, discussed with patient regarding ways to minimize and keep her in remission  #5 nephrolithiasis, will try to review her 24-hour stone risk profiles decrease risk  #6 frequent UTIs, advised her to do frequent voids and avoid urinary retention for a long time  #7 hypothyroidism, defer to primary  #8 dyslipidemia, with her other risk factors , imp maintain good cholesterol and triglycerides    Plan:    1) continue losartan to 100mg at bedtime  2) check BP daily and log  3) low salt diet  4) goal HbA1c of <= 7  5) no NSAIDs  6) increase hydration as has nephrolithiasis    F/U in 6 mths with CKD labs        Current Plans  PTH INTACT (52189)  RENAL FUNCTION PANEL (69825)  Hypertension (I10)  Current Plans  CBC (72827)  Type 2 diabetes mellitus (E11.9) <HCC19>  Current Plans  MICROALBUMIN / CREATININE RATIO (28842)  SPOT PROTEIN URINE (69079)  SPOT URINE CREATININE(94203)  Portal Access Education (Z71.9)  Current Plans  Pt Education - How to Access Health Information Online using Patient Portal and 3rd Party Apps: discussed with patient and provided information.   Obesity with body mass index 30 or greater (E66.9)  Current Plans  LIFESTYLE EDUCATION REGARDING DIET (85145)  Pt Education - Healthy Diet: Brief Version *: healthy diet  Signed by Viktoria Ramires MD (8/3/2022 6:35 AM)

## 2022-10-10 DIAGNOSIS — C50.411 MALIGNANT NEOPLASM OF UPPER-OUTER QUADRANT OF RIGHT FEMALE BREAST, UNSPECIFIED ESTROGEN RECEPTOR STATUS (HCC): ICD-10-CM

## 2022-10-10 DIAGNOSIS — Z79.811 LONG TERM CURRENT USE OF AROMATASE INHIBITOR: ICD-10-CM

## 2022-10-11 RX ORDER — LETROZOLE 2.5 MG/1
2.5 TABLET, FILM COATED ORAL EVERY EVENING
Qty: 90 TABLET | Refills: 2 | Status: SHIPPED | OUTPATIENT
Start: 2022-10-11

## 2022-10-27 DIAGNOSIS — R09.89 BILATERAL CAROTID BRUITS: ICD-10-CM

## 2022-11-17 ENCOUNTER — OFFICE VISIT (OUTPATIENT)
Dept: VASCULAR SURGERY | Age: 67
End: 2022-11-17

## 2022-11-17 VITALS
OXYGEN SATURATION: 100 % | HEART RATE: 102 BPM | BODY MASS INDEX: 47.98 KG/M2 | WEIGHT: 238 LBS | SYSTOLIC BLOOD PRESSURE: 118 MMHG | DIASTOLIC BLOOD PRESSURE: 75 MMHG | HEIGHT: 59 IN

## 2022-11-17 DIAGNOSIS — I65.23 CAROTID STENOSIS, ASYMPTOMATIC, BILATERAL: Primary | ICD-10-CM

## 2022-11-17 RX ORDER — EZETIMIBE 10 MG/1
TABLET ORAL
COMMUNITY
Start: 2022-11-08

## 2022-11-17 NOTE — PROGRESS NOTES
1. Have you been to the ER, urgent care clinic since your last visit? No  Hospitalized since your last visit? No    2. Have you seen or consulted any other health care providers outside of the [address] [place] [address] since your last visit? Include any pap smears or colon screening.  No

## 2022-11-17 NOTE — PROGRESS NOTES
Vascular Surgery Office Visit    Blessing Reyes  Chief Complaint   Patient presents with    Carotid Artery Stenosis     2 year follow up        History and Physical    70 y/o F with known carotid stenosis, identified in 2017. She was last seen in our clinic in 2020, and the plan at that time was to repeat a carotid duplex in 2 years. She returns today. She denies sx of CVA/TIA/AF. She reports feeling well otherwise. No claudication or rest pain. No syncope. She has a hx of R breast cancer with chronic RLE lymphedema, for which she wears compression.      Past Medical History:   Diagnosis Date    Arthritis     Asthma 11/16/2018    Resolved, per patient    Breast CA ([address] 02/28/2014    Right Breast  1-14-19  resolved with surgery    Controlled diabetes mellitus with chronic kidney disease ([address]     stage 3    GERD (gastroesophageal reflux disease) 11/16/2018    Resolved, per patient    Hiatus hernia syndrome     resolved, per pt.  8-6-18    Hypertension     Insulin pump in place     Microhematuria     Morbid obesity ([address] 11/16/2018    BMI = 47.4    Osteopenia     Postmastectomy lymphedema syndrome of right upper extremity 1/18/2020    Recurrent UTI     Staghorn calculus     Struvite kidney stones     Thyroid dysfunction     GOITER    Urethral diverticulum     Urine leukocytes      Patient Active Problem List   Diagnosis Code    Personal history of malignant neoplasm of breast Z85.3    Morbid obesity ([address] E66.01    UTI (urinary tract infection) N39.0    Sepsis ([address] A41.9    Insulin pump status Z96.41    Postmastectomy lymphedema syndrome of right upper extremity I97.2    Controlled diabetes mellitus with chronic kidney disease (HCC) E11.22    Staghorn renal calculus N20.0    Acute renal failure superimposed on stage 3 chronic kidney disease (HCC) N17.9, N18.30    Bacterial infection A49.9    Essential hypertension I10    Hypothyroidism E03.9    Oth postproc endocrine and metabolic comp and disorders E89.89 Recurrent UTI N39.0    Vitamin B deficiency E53.9     Past Surgical History:   Procedure Laterality Date    HX BREAST BIOPSY  10/18/10    stereotactic - right breast calcifications    HX BREAST BIOPSY  2/14/2014    RIGHT BREAST BIOPSY performed by Edwin Glaser MD at [facility] MAIN OR    HX CYST REMOVAL  1997    Bilateral breast.     HX GI      colonoscopy    HX GYN      urethra reconstruction x3    HX HYSTERECTOMY      HX MODIFIED RADICAL MASTECTOMY  3/21/2014    RIGHT MODIFIED RADICAL MASTECTOMY SENTINAL LYMPH NODE BIOPSY performed by Edwin Glaser MD at [facility] MAIN OR    HX TUBAL LIGATION      HX UROLOGICAL      4 diverticuli repaired    HX UROLOGICAL  03/29/2011    Cysto, percutaneous nephrolithotomy, antegrade URS, antegrade pyelogram.  Dr. Yolanda Ahmadi, [facility].  UROLOGICAL  12/11/2009    Cysto, flexible URS, right RPG, lasertripsy, JJ stent placement. Dr. Yolanda Ahmadi, [facility].  UROLOGICAL  11/20/2009    Second look right attempted percutaneous nephrolithotomy. Dr. Yolanda Ahmadi, [facility].  UROLOGICAL  10/23/2009    Cysto, right percutaneous nephrolithotomy. Dr. Yolanda Ahmadi, [facility].  UROLOGICAL  08/07/2018    Cysto, bilat RPG, right URS, HLL, right JJ stent placement, right nephrostomy tube removal, Dr. Susie Ny, [facility] UROLOGICAL  09/04/2018    cysto, right JJ stent removal, bilat RPG. Dr Susie Ny. [address] HX UROLOGICAL  11/20/2018    cysto, bilat RPG, left URS, laser lithotripsy, left JJ stent place. Dr. Susie Ny. [address]  UROLOGICAL  01/25/2019    Left PCNL and removal of left JJ stent. Dr. Susie Ny at [address]  UROLOGICAL  07/31/2020    Cystoscopy, ureteroscopy, fluoroscopy, retrograde pyelogram, and exchange of a double-J stent 6 x 24. HX UROLOGICAL  09/25/2020    Cystoscopy, ureteroscopy, nephrostomy tube removal, stent placement.      HX UROLOGICAL  09/23/2020    S/p Left PCNL    HX VASCULAR ACCESS  2014    IR GUIDE DIL URET TRACT EXIST INCL NEW ACCESS W TUBE PLACMENT LT  1/25/2019     Current Outpatient Medications   Medication Sig Dispense Refill    ezetimibe (ZETIA) 10 mg tablet       letrozole (FEMARA) 2.5 mg tablet TAKE 1 TABLET BY MOUTH EVERY EVENING 90 Tablet 2    losartan (COZAAR) 100 mg tablet       famotidine (PEPCID) 20 mg tablet       HumaLOG U-100 Insulin 100 unit/mL injection       cefdinir (OMNICEF) 300 mg capsule       omeprazole (PRILOSEC) 20 mg capsule       atorvastatin (LIPITOR) 40 mg tablet       PROLIA 60 mg/mL injection 60 mg every 6 months. levothyroxine (SYNTHROID) 88 mcg tablet Take 88 mcg by mouth daily. multivitamin (ONE A DAY) tablet Take 1 Tab by mouth daily.        Allergies   Allergen Reactions    Nsaids (Non-Steroidal Anti-Inflammatory Drug) Other (comments)     Patient has chronic kidney disease stage III    Ampicillin Hives    Lisinopril Cough      Social History     Tobacco Use    Smoking status: Never    Smokeless tobacco: Never   Vaping Use    Vaping Use: Never used   Substance Use Topics    Alcohol use: Never     Comment: occasionally    Drug use: No     Family History   Problem Relation Age of Onset    Breast Cancer Sister 79    Diabetes Mother     Hypertension Mother     Breast Cancer Sister 39    Hypertension Other         Parent    Diabetes Other         parent    Stroke Other         parent    Hypertension Other         sibling    Diabetes Other         sibling    Osteoporosis Other         sibling    Breast Cancer Sister     Diabetes Sister     Diabetes Sister     Diabetes Sister      Review of Systems    General: negative for fever   Eyes: negative for vision loss   HENT: negative for cold symptoms   Respiratory negative for shortness of breath   Cardiac: negative for chest pain   Vascular negative for foot pain at night    Gastrointestinal: negative for abdominal pain   Genitourinary: negative for dysuria    Endocrine: negative for excessive thirst   Skin: negative for rash   Neurological: negative for paralysis   Psychiatric: negative for depression        Physical Exam:    Visit Vitals  /75   Pulse (!) 102   Ht 4' 11\" (1.499 m)   Wt 238 lb (108 kg)   SpO2 100%   BMI 48.07 kg/m²        Constitutional:  Patient is well developed, well nourished, and not distressed. HEENT: Atraumatic, normocephalic, wearing a mask. Eyes:   Cunjunctivae clear, no scleral icterus  Cardiovascular:  Normal rate, regular rhythm, normal heart sounds. Pulmonary/Chest: Effort normal and breath sounds normal.  she has no wheezes or no rales. Abdominal:   Soft, non-distended. No tenderness to palpation. Extremities: RUE warm with marked lymphedema of the hand and forearm. BLE warm. Neurological:  she  is alert and oriented x3. Motor & sensory grossly intact in all 4 limbs. Psych: Appropriate mood and affect. Imaging/Studies:     Carotid duplex performed today: R ICA intimal thickening with < 50% stenosis. L ICA plaque with < 50% stenosis. Occluded R VA. Antegrade L VA. Similar findings on study in 2020. Carotid artery duplex 2020: R ICA intimal thickening with < 50% stenosis. L ICA plaque with < 50% stenosis. Occluded R VA. Antegrade L VA. Impression:  Stable mild bilateral internal carotid artery stenosis. Asymptomatic chronic R vertebral artery occlusion. Plan:  Return to clinic in 1 year with a repeat carotid duplex. Continue lipitor    Que Norman MD  Vascular Surgeon      I spent approximately 30 minutes on this patient encounter, which includes but is not limited to performing a history and physical exam, reviewing primary care and consultant documentation, reviewing imaging/studies, and speaking with her regarding my impression and plan.

## 2022-12-21 ENCOUNTER — TELEPHONE (OUTPATIENT)
Age: 67
End: 2022-12-21

## 2022-12-21 ENCOUNTER — HOSPITAL ENCOUNTER (OUTPATIENT)
Dept: LAB | Age: 67
Discharge: HOME | End: 2022-12-21
Payer: MEDICARE

## 2022-12-21 ENCOUNTER — APPOINTMENT (OUTPATIENT)
Dept: ONCOLOGY | Age: 67
End: 2022-12-21

## 2022-12-21 DIAGNOSIS — C50.411 MALIGNANT NEOPLASM OF UPPER-OUTER QUADRANT OF RIGHT FEMALE BREAST, UNSPECIFIED ESTROGEN RECEPTOR STATUS (HCC): ICD-10-CM

## 2022-12-21 DIAGNOSIS — C50.411 MALIGNANT NEOPLASM OF UPPER-OUTER QUADRANT OF RIGHT BREAST IN FEMALE, ESTROGEN RECEPTOR POSITIVE (HCC): ICD-10-CM

## 2022-12-21 DIAGNOSIS — Z17.0 MALIGNANT NEOPLASM OF UPPER-OUTER QUADRANT OF RIGHT BREAST IN FEMALE, ESTROGEN RECEPTOR POSITIVE (HCC): ICD-10-CM

## 2022-12-21 DIAGNOSIS — Z79.811 LONG TERM CURRENT USE OF AROMATASE INHIBITOR: ICD-10-CM

## 2022-12-21 DIAGNOSIS — I89.0 LYMPHEDEMA OF UPPER EXTREMITY FOLLOWING LYMPHADENECTOMY: ICD-10-CM

## 2022-12-21 DIAGNOSIS — E89.89 LYMPHEDEMA OF UPPER EXTREMITY FOLLOWING LYMPHADENECTOMY: ICD-10-CM

## 2022-12-21 LAB
ALBUMIN SERPL-MCNC: 3.5 G/DL (ref 3.4–5)
ALBUMIN/GLOB SERPL: 0.9 {RATIO} (ref 0.8–1.7)
ALP SERPL-CCNC: 127 U/L (ref 45–117)
ALT SERPL-CCNC: 21 U/L (ref 13–56)
ANION GAP SERPL CALC-SCNC: 5 MMOL/L (ref 3–18)
AST SERPL-CCNC: 10 U/L (ref 10–38)
BASOPHILS # BLD: 0.1 K/UL (ref 0–0.1)
BASOPHILS NFR BLD: 1 % (ref 0–2)
BILIRUB SERPL-MCNC: 0.5 MG/DL (ref 0.2–1)
BUN SERPL-MCNC: 29 MG/DL (ref 7–18)
BUN/CREAT SERPL: 19 (ref 12–20)
CALCIUM SERPL-MCNC: 8.6 MG/DL (ref 8.5–10.1)
CHLORIDE SERPL-SCNC: 112 MMOL/L (ref 100–111)
CO2 SERPL-SCNC: 25 MMOL/L (ref 21–32)
CREAT SERPL-MCNC: 1.56 MG/DL (ref 0.6–1.3)
DIFFERENTIAL METHOD BLD: ABNORMAL
EOSINOPHIL # BLD: 0.3 K/UL (ref 0–0.4)
EOSINOPHIL NFR BLD: 3 % (ref 0–5)
ERYTHROCYTE [DISTWIDTH] IN BLOOD BY AUTOMATED COUNT: 15.1 % (ref 11.6–14.5)
GLOBULIN SER CALC-MCNC: 4.1 G/DL (ref 2–4)
GLUCOSE SERPL-MCNC: 166 MG/DL (ref 74–99)
HCT VFR BLD AUTO: 42 % (ref 35–45)
HGB BLD-MCNC: 13 G/DL (ref 12–16)
IMM GRANULOCYTES # BLD AUTO: 0 K/UL (ref 0–0.04)
IMM GRANULOCYTES NFR BLD AUTO: 0 % (ref 0–0.5)
LYMPHOCYTES # BLD: 2.5 K/UL (ref 0.9–3.6)
LYMPHOCYTES NFR BLD: 27 % (ref 21–52)
MCH RBC QN AUTO: 25.4 PG (ref 24–34)
MCHC RBC AUTO-ENTMCNC: 31 G/DL (ref 31–37)
MCV RBC AUTO: 82 FL (ref 78–100)
MONOCYTES # BLD: 0.6 K/UL (ref 0.05–1.2)
MONOCYTES NFR BLD: 6 % (ref 3–10)
NEUTS SEG # BLD: 5.8 K/UL (ref 1.8–8)
NEUTS SEG NFR BLD: 63 % (ref 40–73)
NRBC # BLD: 0 K/UL (ref 0–0.01)
NRBC BLD-RTO: 0 PER 100 WBC
PLATELET # BLD AUTO: 329 K/UL (ref 135–420)
PMV BLD AUTO: 9.8 FL (ref 9.2–11.8)
POTASSIUM SERPL-SCNC: 4.8 MMOL/L (ref 3.5–5.5)
PROT SERPL-MCNC: 7.6 G/DL (ref 6.4–8.2)
RBC # BLD AUTO: 5.12 M/UL (ref 4.2–5.3)
SODIUM SERPL-SCNC: 142 MMOL/L (ref 136–145)
WBC # BLD AUTO: 9.2 K/UL (ref 4.6–13.2)

## 2022-12-21 PROCEDURE — 85025 COMPLETE CBC W/AUTO DIFF WBC: CPT

## 2022-12-21 PROCEDURE — 80053 COMPREHEN METABOLIC PANEL: CPT

## 2022-12-21 PROCEDURE — 36415 COLL VENOUS BLD VENIPUNCTURE: CPT

## 2022-12-21 PROCEDURE — 86300 IMMUNOASSAY TUMOR CA 15-3: CPT

## 2022-12-21 NOTE — TELEPHONE ENCOUNTER
Left message on voicemail to have patient contact our office to schedule for an appointment on this Friday

## 2022-12-22 LAB — CANCER AG27-29 SERPL-ACNC: 31.6 U/ML (ref 0–38.6)

## 2022-12-23 ENCOUNTER — OFFICE VISIT (OUTPATIENT)
Dept: ONCOLOGY | Age: 67
End: 2022-12-23
Payer: MEDICARE

## 2022-12-23 VITALS
HEART RATE: 95 BPM | RESPIRATION RATE: 18 BRPM | OXYGEN SATURATION: 98 % | HEIGHT: 59 IN | BODY MASS INDEX: 50.12 KG/M2 | WEIGHT: 248.6 LBS | SYSTOLIC BLOOD PRESSURE: 123 MMHG | DIASTOLIC BLOOD PRESSURE: 59 MMHG

## 2022-12-23 DIAGNOSIS — C50.411 MALIGNANT NEOPLASM OF UPPER-OUTER QUADRANT OF RIGHT FEMALE BREAST, UNSPECIFIED ESTROGEN RECEPTOR STATUS (HCC): Primary | ICD-10-CM

## 2022-12-23 DIAGNOSIS — E89.89 LYMPHEDEMA OF UPPER EXTREMITY FOLLOWING LYMPHADENECTOMY: ICD-10-CM

## 2022-12-23 DIAGNOSIS — I89.0 LYMPHEDEMA OF UPPER EXTREMITY FOLLOWING LYMPHADENECTOMY: ICD-10-CM

## 2022-12-23 DIAGNOSIS — Z79.811 LONG TERM CURRENT USE OF AROMATASE INHIBITOR: ICD-10-CM

## 2022-12-23 DIAGNOSIS — C50.411 MALIGNANT NEOPLASM OF UPPER-OUTER QUADRANT OF RIGHT FEMALE BREAST, UNSPECIFIED ESTROGEN RECEPTOR STATUS (HCC): ICD-10-CM

## 2022-12-23 DIAGNOSIS — N64.4 BREAST PAIN, LEFT: ICD-10-CM

## 2022-12-23 NOTE — PROGRESS NOTES
Hematology/Medical Oncology Progress Note    Name: Deven Noguera  Date: 2022  : 1955    Lady Janice DO     Ms. Rosalind Magaña is a 69y.o. year old female who was seen for management of her invasive ductal adenocarcinoma right and anemia     Current therapy: Femara 2.5mg PO daily and oral iron supplementation daily. Subjective:   Ms. Rosalind Magaña is a 74year-old woman who is currently taking Femara 2.5mg PO daily as long-term management for her invasive ductal carcinoma of the breast. She states she continues to tolerate the medication without any issues. She states that she wears her sleeve and compression garment for her lymphedema on her right upper extremities. She reports that she is being followed by Nephrology for her CKD. Today she is presenting with Left Breast Pain x 2 weeks now. Otherwise she denies fevers, chills, night sweats, unintentional weight loss, skin lumps or bumps, acute bleeding or bruising issues. No acute bleeding, blood in stool, dark stool, melena, hematochezia, hemoptysis, dark urine, or easily bruising. Denies headaches, acute vision changes, dizziness, chest pain, shortness of breath, palpitation, productive cough, nausea, vomiting, abdominal pain, altered bowel habits, dysuria, new bone pain or back pain, focal numbness or weakness, chest pain or breast discomfort. She does not have any other concerns or complaints to report at this time. Past medical history, family history, and social history: these were reviewed and remains unchanged.     Past Medical History:   Diagnosis Date    Arthritis     Asthma 2018    Resolved, per patient    Breast CA ([facility] [address] 2014    Right Breast  19  resolved with surgery    Controlled diabetes mellitus with chronic kidney disease ([facility] [address]     stage 3    GERD (gastroesophageal reflux disease) 2018    Resolved, per patient    Hiatus hernia syndrome     resolved, per pt.  8-6-18    Hypertension     Insulin pump in place Microhematuria     Morbid obesity ([address] 11/16/2018    BMI = 47.4    Osteopenia     Postmastectomy lymphedema syndrome of right upper extremity 1/18/2020    Recurrent UTI     Staghorn calculus     Struvite kidney stones     Thyroid dysfunction     GOITER    Urethral diverticulum     Urine leukocytes      Past Surgical History:   Procedure Laterality Date    HX BREAST BIOPSY  10/18/10    stereotactic - right breast calcifications    HX BREAST BIOPSY  2/14/2014    RIGHT BREAST BIOPSY performed by Armand Norwood MD at [address] [facility]    HX CYST REMOVAL  1997    Bilateral breast.     HX GI      colonoscopy    HX GYN      urethra reconstruction x3    HX HYSTERECTOMY      HX MODIFIED RADICAL MASTECTOMY  3/21/2014    RIGHT MODIFIED RADICAL MASTECTOMY SENTINAL LYMPH NODE BIOPSY performed by Armand Norwood MD at [facility] MAIN OR    HX TUBAL LIGATION      HX UROLOGICAL      4 diverticuli repaired    HX UROLOGICAL  03/29/2011    Cysto, percutaneous nephrolithotomy, antegrade URS, antegrade pyelogram.  Dr. Ines Nguyen, [facility]. HX UROLOGICAL  12/11/2009    Cysto, flexible URS, right RPG, lasertripsy, JJ stent placement. Dr. Ines Nguyen, [facility]. HX UROLOGICAL  11/20/2009    Second look right attempted percutaneous nephrolithotomy. Dr. Ines Nguyen, [facility].  UROLOGICAL  10/23/2009    Cysto, right percutaneous nephrolithotomy. Dr. Ines Nguyen, [facility]. HX UROLOGICAL  08/07/2018    Cysto, bilat RPG, right URS, HLL, right JJ stent placement, right nephrostomy tube removal, Dr. Sam Sessions, [facility] UROLOGICAL  09/04/2018    cysto, right JJ stent removal, bilat RPG. Dr Sam Sessions. [address]. HX UROLOGICAL  11/20/2018    cysto, bilat RPG, left URS, laser lithotripsy, left JJ stent place. Dr. Sam Sessions. [address]  UROLOGICAL  01/25/2019    Left PCNL and removal of left JJ stent. Dr. Sam Sessions at [address]  UROLOGICAL  07/31/2020    Cystoscopy, ureteroscopy, fluoroscopy, retrograde pyelogram, and exchange of a double-J stent 6 x 24.      HX UROLOGICAL  09/25/2020    Cystoscopy, ureteroscopy, nephrostomy tube removal, stent placement.      HX UROLOGICAL  09/23/2020    S/p Left PCNL    HX VASCULAR ACCESS  2014    IR GUIDE DIL URET TRACT EXIST INCL NEW ACCESS W TUBE PLACMENT LT  1/25/2019     Social History     Socioeconomic History    Marital status: SINGLE     Spouse name: Not on file    Number of children: Not on file    Years of education: Not on file    Highest education level: Not on file   Occupational History    Occupation:      Employer:  [address]   Tobacco Use    Smoking status: Never    Smokeless tobacco: Never   Vaping Use    Vaping Use: Never used   Substance and Sexual Activity    Alcohol use: Never     Comment: occasionally    Drug use: No    Sexual activity: Not on file   Other Topics Concern    Not on file   Social History Narrative    Not on file     Social Determinants of Health     Financial Resource Strain: Not on file   Food Insecurity: Not on file   Transportation Needs: Not on file   Physical Activity: Not on file   Stress: Not on file   Social Connections: Not on file   Intimate Partner Violence: Not on file   Housing Stability: Not on file     Family History   Problem Relation Age of Onset    Breast Cancer Sister 79    Diabetes Mother     Hypertension Mother     Breast Cancer Sister 39    Hypertension Other         Parent    Diabetes Other         parent    Stroke Other         parent    Hypertension Other         sibling    Diabetes Other         sibling    Osteoporosis Other         sibling    Breast Cancer Sister     Diabetes Sister     Diabetes Sister     Diabetes Sister      Current Outpatient Medications   Medication Sig Dispense Refill    ezetimibe (ZETIA) 10 mg tablet       letrozole (FEMARA) 2.5 mg tablet TAKE 1 TABLET BY MOUTH EVERY EVENING 90 Tablet 2    losartan (COZAAR) 100 mg tablet       famotidine (PEPCID) 20 mg tablet       HumaLOG U-100 Insulin 100 unit/mL injection       cefdinir (OMNICEF) 300 mg capsule       omeprazole (PRILOSEC) 20 mg capsule       atorvastatin (LIPITOR) 40 mg tablet       PROLIA 60 mg/mL injection 60 mg every 6 months. levothyroxine (SYNTHROID) 88 mcg tablet Take 88 mcg by mouth daily. multivitamin (ONE A DAY) tablet Take 1 Tab by mouth daily. Review of Systems   Constitutional: Negative. HENT: Negative. Eyes: Negative. Respiratory: Negative. Cardiovascular: Negative. Gastrointestinal: Negative. Genitourinary: Negative. Musculoskeletal: Negative. Skin: Negative. Neurological: Negative. Endo/Heme/Allergies: Negative. Psychiatric/Behavioral: Negative. Objective:     Visit Vitals  BP (!) 123/59   Pulse 95   Resp 18   Ht 4' 11\" (1.499 m)   Wt 112.8 kg (248 lb 9.6 oz)   SpO2 98%   BMI 50.21 kg/m²     ECOG PS0  Physical Exam  Constitutional:       Appearance: She is obese. Cardiovascular:      Pulses: Normal pulses. Pulmonary:      Effort: Pulmonary effort is normal.   Chest:      Comments: Anterior chest wall and breast: Examination of the chest wall and breast shows no mass, or abnormality. The axilla reveals no palpable axillary lymphadenopathy. Right breast is surgically removed     Musculoskeletal:         General: Swelling present. Cervical back: Normal range of motion. Skin:     General: Skin is warm. Neurological:      Mental Status: She is alert and oriented to person, place, and time.    Psychiatric:         Behavior: Behavior normal.        Lab data:  Lab Results   Component Value Date/Time    WBC 9.2 12/21/2022 11:18 AM    Hemoglobin, POC 11.9 (L) 11/26/2012 04:42 PM    HGB (POC) 12.6 04/11/2014 12:59 PM    HGB 13.0 12/21/2022 11:18 AM    Hematocrit, POC 35 (L) 11/26/2012 04:42 PM    HCT (POC) 39.6 04/11/2014 12:59 PM    HCT 42.0 12/21/2022 11:18 AM    PLATELET 473 56/32/3048 11:18 AM    MCV 82.0 12/21/2022 11:18 AM     Lab Results   Component Value Date/Time    Sodium 142 12/21/2022 11:18 AM    Potassium 4.8 12/21/2022 11:18 AM Chloride 112 (H) 12/21/2022 11:18 AM    CO2 25 12/21/2022 11:18 AM    Anion gap 5 12/21/2022 11:18 AM    Glucose 166 (H) 12/21/2022 11:18 AM    BUN 29 (H) 12/21/2022 11:18 AM    Creatinine 1.56 (H) 12/21/2022 11:18 AM    BUN/Creatinine ratio 19 12/21/2022 11:18 AM    GFR est AA 37 (L) 07/25/2022 12:59 PM    GFR est non-AA 30 (L) 07/25/2022 12:59 PM    Calcium 8.6 12/21/2022 11:18 AM    Bilirubin, total 0.5 12/21/2022 11:18 AM    Alk. phosphatase 127 (H) 12/21/2022 11:18 AM    Protein, total 7.6 12/21/2022 11:18 AM    Albumin 3.5 12/21/2022 11:18 AM    Globulin 4.1 (H) 12/21/2022 11:18 AM    A-G Ratio 0.9 12/21/2022 11:18 AM    ALT (SGPT) 21 12/21/2022 11:18 AM    AST (SGOT) 10 12/21/2022 11:18 AM             Assessment:     1. Malignant neoplasm of upper-outer quadrant of right female breast, unspecified estrogen receptor status ([facility] [address]    2. Breast pain, left    3. Long term current use of aromatase inhibitor    4. Lymphedema of upper extremity following lymphadenectomy        Plan:   Malignant neoplasm of upper-outer quadrant of right female breast, positive estrogen receptor   -- Patient was being followed by Dr. Maida Beckwith who retired. -- Her Letrozole started on 08/08/2014. She was advised to continue Letrozole 2.5mg PO daily for full 10 years  -- 3/04/2022  Left Mammogram: No evidence of malignancy. Routine follow-up was recommended. --Recent labs reviewed with patient today      Plan:  -- The patient was instructed to continue doing her breast exams on a monthly basis. -- The patient was advised to notify us if any new breast pain, new palpable nodule, nipple inversion, spontaneous nipple discharge especially if bloody, breast skin changes, unintentional weight loss, worsen fatigue, new bone pain or back pain, or any concerns. -- I have advised her to follow up with her PCP to continue age-appropriate cancer screening.   -- I have educated her regarding lifestyle modifications, minimizing alcohol intake, refraining from smoking, healthy diet, and physical activity. Breast Pain  --Patient reports Left Breast Pain x 2 weeks now. Patient describes it as irritating pain. Denies any breast lumps or secretions  --Will obtain Left Breast US  --Follow up in 3 weeks for results or sooner if indicated. Long term use of aromatase inhibitor:   -- The patient currently taking Letrozole daily. She states she is tolerating the medication without any issue and this will be continued. -- the patient report that she is up to date with her DEXA scan from her GYN. I have advise her to follow up with PCP on checking vitamin D level.  + Advised on adequate amounts of calcium (at least 1,200 mg per day) and vitamin D (800-1,000 IU per day). The higher dose of vitamin D may be needed if vitamin D deficient.  + Recommend regular muscle-strengthening exercise to reduce the risk of falls and fractures. + Advised avoidance of tobacco smoking and excessive alcohol intake. Lymphedema of upper extremity following lymphadenectomy (right )  --She continues to wear her sleeve, glove, and chest garment. --We advised her to continue to avoid lifting more than 10 pounds using the right arm and hand. Follow up in 3 weeks for results or sooner if indicated.       Orders Placed This Encounter    US BREAST LT LIMITED=<3 QUAD     Standing Status:   Future     Standing Expiration Date:   1/23/2024     Order Specific Question:   Reason for Exam     Answer:   Breast pain, Left       Gianluca Van DNP  12/23/2022      CC: Zeenat Pierre DO

## 2022-12-28 DIAGNOSIS — C50.411 MALIGNANT NEOPLASM OF UPPER-OUTER QUADRANT OF RIGHT BREAST IN FEMALE, ESTROGEN RECEPTOR POSITIVE (HCC): ICD-10-CM

## 2022-12-28 DIAGNOSIS — C50.411 MALIGNANT NEOPLASM OF UPPER-OUTER QUADRANT OF RIGHT FEMALE BREAST, UNSPECIFIED ESTROGEN RECEPTOR STATUS (HCC): ICD-10-CM

## 2022-12-28 DIAGNOSIS — Z17.0 MALIGNANT NEOPLASM OF UPPER-OUTER QUADRANT OF RIGHT BREAST IN FEMALE, ESTROGEN RECEPTOR POSITIVE (HCC): ICD-10-CM

## 2022-12-28 DIAGNOSIS — N64.4 BREAST PAIN, LEFT: ICD-10-CM

## 2022-12-28 DIAGNOSIS — N64.4 BREAST PAIN, LEFT: Primary | ICD-10-CM

## 2023-01-24 ENCOUNTER — HOSPITAL ENCOUNTER (OUTPATIENT)
Dept: ULTRASOUND IMAGING | Age: 68
Discharge: HOME | End: 2023-01-24
Attending: NURSE PRACTITIONER
Payer: MEDICARE

## 2023-01-24 ENCOUNTER — HOSPITAL ENCOUNTER (OUTPATIENT)
Dept: MAMMOGRAPHY | Age: 68
Discharge: HOME | End: 2023-01-24
Attending: INTERNAL MEDICINE
Payer: MEDICARE

## 2023-01-24 PROCEDURE — 77061 BREAST TOMOSYNTHESIS UNI: CPT

## 2023-01-24 PROCEDURE — 76642 ULTRASOUND BREAST LIMITED: CPT

## 2023-02-01 ENCOUNTER — OFFICE VISIT (OUTPATIENT)
Dept: ONCOLOGY | Age: 68
End: 2023-02-01
Payer: MEDICARE

## 2023-02-01 VITALS
HEIGHT: 59 IN | HEART RATE: 97 BPM | SYSTOLIC BLOOD PRESSURE: 135 MMHG | BODY MASS INDEX: 49.8 KG/M2 | DIASTOLIC BLOOD PRESSURE: 63 MMHG | RESPIRATION RATE: 18 BRPM | OXYGEN SATURATION: 97 % | WEIGHT: 247 LBS

## 2023-02-01 DIAGNOSIS — Z79.811 LONG TERM CURRENT USE OF AROMATASE INHIBITOR: ICD-10-CM

## 2023-02-01 DIAGNOSIS — N64.4 BREAST PAIN, LEFT: ICD-10-CM

## 2023-02-01 DIAGNOSIS — C50.411 MALIGNANT NEOPLASM OF UPPER-OUTER QUADRANT OF RIGHT FEMALE BREAST, UNSPECIFIED ESTROGEN RECEPTOR STATUS (HCC): Primary | ICD-10-CM

## 2023-02-01 DIAGNOSIS — C50.411 MALIGNANT NEOPLASM OF UPPER-OUTER QUADRANT OF RIGHT FEMALE BREAST, UNSPECIFIED ESTROGEN RECEPTOR STATUS (HCC): ICD-10-CM

## 2023-02-01 PROCEDURE — G0463 HOSPITAL OUTPT CLINIC VISIT: HCPCS | Performed by: INTERNAL MEDICINE

## 2023-02-01 RX ORDER — LETROZOLE 2.5 MG/1
2.5 TABLET, FILM COATED ORAL EVERY EVENING
Qty: 90 TABLET | Refills: 2 | Status: SHIPPED | OUTPATIENT
Start: 2023-02-01

## 2023-02-01 NOTE — PROGRESS NOTES
Hematology/Medical Oncology Progress Note    Name: Johnny Carlson  Date: 2023  : 1955    Zeenat Pierre DO     Ms. Sharon Kern is a 69y.o. year old female who was seen for management of her invasive ductal adenocarcinoma right and anemia     Current therapy: Femara 2.5mg PO daily and oral iron supplementation daily. Subjective:   Ms. Sharon Kern is a 69year-old woman who is currently taking Femara 2.5mg PO daily as long-term management for her invasive ductal carcinoma of the breast. She states she continues to tolerate the medication without any issues. She states that she wears her sleeve and compression garment for her lymphedema on her right upper extremities. She reports that she is being followed by Nephrology for her CKD. Today she denies left breast pain. she denies fevers, chills, night sweats, unintentional weight loss, skin lumps or bumps, acute bleeding or bruising issues. No acute bleeding, blood in stool, dark stool, melena, hematochezia, hemoptysis, dark urine, or easily bruising. Denies headaches, acute vision changes, dizziness, chest pain, shortness of breath, palpitation, productive cough, nausea, vomiting, abdominal pain, altered bowel habits, dysuria, new bone pain or back pain, focal numbness or weakness, chest pain or breast discomfort. She does not have any other concerns or complaints to report at this time. Past medical history, family history, and social history: these were reviewed and remains unchanged.     Past Medical History:   Diagnosis Date    Arthritis     Asthma 2018    Resolved, per patient    Breast CA ([facility] [address] 2014    Right Breast  19  resolved with surgery    Controlled diabetes mellitus with chronic kidney disease ([facility] [address]     stage 3    GERD (gastroesophageal reflux disease) 2018    Resolved, per patient    Hiatus hernia syndrome     resolved, per pt.  8-6-18    Hypertension     Insulin pump in place     Microhematuria     Morbid obesity ([facility] [address] 11/16/2018    BMI = 47.4    Osteopenia     Postmastectomy lymphedema syndrome of right upper extremity 1/18/2020    Recurrent UTI     Staghorn calculus     Struvite kidney stones     Thyroid dysfunction     GOITER    Urethral diverticulum     Urine leukocytes      Past Surgical History:   Procedure Laterality Date    HX BREAST BIOPSY  10/18/10    stereotactic - right breast calcifications    HX BREAST BIOPSY  2/14/2014    RIGHT BREAST BIOPSY performed by Mitesh Nuñez MD at [address] [facility]    HX CYST REMOVAL  1997    Bilateral breast.     HX GI      colonoscopy    HX GYN      urethra reconstruction x3    HX HYSTERECTOMY      HX MODIFIED RADICAL MASTECTOMY  3/21/2014    RIGHT MODIFIED RADICAL MASTECTOMY SENTINAL LYMPH NODE BIOPSY performed by Mitesh Nuñez MD at [facility] MAIN OR    HX TUBAL LIGATION      HX UROLOGICAL      4 diverticuli repaired    HX UROLOGICAL  03/29/2011    Cysto, percutaneous nephrolithotomy, antegrade URS, antegrade pyelogram.  Dr. Sharon Nam, [facility]. HX UROLOGICAL  12/11/2009    Cysto, flexible URS, right RPG, lasertripsy, JJ stent placement. Dr. Sharon Nam, [facility]. HX UROLOGICAL  11/20/2009    Second look right attempted percutaneous nephrolithotomy. Dr. Sharon Nam, [facility].  UROLOGICAL  10/23/2009    Cysto, right percutaneous nephrolithotomy. Dr. Sharon Nam, [facility]. HX UROLOGICAL  08/07/2018    Cysto, bilat RPG, right URS, HLL, right JJ stent placement, right nephrostomy tube removal, Dr. Tre Vega, [facility] UROLOGICAL  09/04/2018    cysto, right JJ stent removal, bilat RPG. Dr Tre Vega. [address] HX UROLOGICAL  11/20/2018    cysto, bilat RPG, left URS, laser lithotripsy, left JJ stent place. Dr. Tre Vega. [address]  UROLOGICAL  01/25/2019    Left PCNL and removal of left JJ stent. Dr. Tre Vega at [address]  UROLOGICAL  07/31/2020    Cystoscopy, ureteroscopy, fluoroscopy, retrograde pyelogram, and exchange of a double-J stent 6 x 24.      HX UROLOGICAL  09/25/2020    Cystoscopy, ureteroscopy, nephrostomy tube removal, stent placement. HX UROLOGICAL  09/23/2020    S/p Left PCNL    HX VASCULAR ACCESS  2014    IR GUIDE DIL URET TRACT EXIST INCL NEW ACCESS W TUBE PLACMENT LT  1/25/2019     Social History     Socioeconomic History    Marital status: SINGLE     Spouse name: Not on file    Number of children: Not on file    Years of education: Not on file    Highest education level: Not on file   Occupational History    Occupation:      Employer:  [employer]   Tobacco Use    Smoking status: Never    Smokeless tobacco: Never   Vaping Use    Vaping Use: Never used   Substance and Sexual Activity    Alcohol use: Never     Comment: occasionally    Drug use: No    Sexual activity: Not on file   Other Topics Concern    Not on file   Social History Narrative    Not on file     Social Determinants of Health     Financial Resource Strain: Not on file   Food Insecurity: Not on file   Transportation Needs: Not on file   Physical Activity: Not on file   Stress: Not on file   Social Connections: Not on file   Intimate Partner Violence: Not on file   Housing Stability: Not on file     Family History   Problem Relation Age of Onset    Breast Cancer Sister 79    Diabetes Mother     Hypertension Mother     Breast Cancer Sister 39    Hypertension Other         Parent    Diabetes Other         parent    Stroke Other         parent    Hypertension Other         sibling    Diabetes Other         sibling    Osteoporosis Other         sibling    Breast Cancer Sister     Diabetes Sister     Diabetes Sister     Diabetes Sister      Current Outpatient Medications   Medication Sig Dispense Refill    letrozole (FEMARA) 2.5 mg tablet Take 1 Tablet by mouth every evening.  90 Tablet 2    ezetimibe (ZETIA) 10 mg tablet       losartan (COZAAR) 100 mg tablet       famotidine (PEPCID) 20 mg tablet       HumaLOG U-100 Insulin 100 unit/mL injection       omeprazole (PRILOSEC) 20 mg capsule       atorvastatin (LIPITOR) 40 mg tablet PROLIA 60 mg/mL injection 60 mg every 6 months. levothyroxine (SYNTHROID) 88 mcg tablet Take 88 mcg by mouth daily. multivitamin (ONE A DAY) tablet Take 1 Tab by mouth daily. Review of Systems   Constitutional: Negative. Negative for chills, diaphoresis, fever, malaise/fatigue and weight loss. HENT: Negative. Eyes: Negative. Respiratory: Negative. Negative for cough, hemoptysis, shortness of breath and wheezing. Cardiovascular: Negative. Negative for chest pain, palpitations and leg swelling. Gastrointestinal: Negative. Negative for abdominal pain, diarrhea, heartburn, nausea and vomiting. Genitourinary: Negative. Negative for dysuria, frequency, hematuria and urgency. Musculoskeletal: Negative. Negative for joint pain and myalgias. Skin: Negative. Negative for itching and rash. Neurological: Negative. Negative for dizziness, seizures, weakness and headaches. Endo/Heme/Allergies: Negative. Psychiatric/Behavioral: Negative. Negative for depression. The patient does not have insomnia. Objective:     Visit Vitals  /63   Pulse 97   Resp 18   Ht 4' 11\" (1.499 m)   Wt 112 kg (247 lb)   SpO2 97%   BMI 49.89 kg/m²     ECOG PS0  Physical Exam  Constitutional:       Appearance: She is obese. Cardiovascular:      Pulses: Normal pulses. Pulmonary:      Effort: Pulmonary effort is normal.   Chest:      Comments: Anterior chest wall and breast: Examination of the chest wall and breast shows no mass, or abnormality. The axilla reveals no palpable axillary lymphadenopathy. Right breast is surgically removed     Musculoskeletal:         General: Swelling present. Cervical back: Normal range of motion. Skin:     General: Skin is warm. Neurological:      Mental Status: She is alert and oriented to person, place, and time.    Psychiatric:         Behavior: Behavior normal.        Lab data:  Lab Results   Component Value Date/Time    WBC 9.2 12/21/2022 11:18 AM    Hemoglobin, POC 11.9 (L) 11/26/2012 04:42 PM    HGB (POC) 12.6 04/11/2014 12:59 PM    HGB 13.0 12/21/2022 11:18 AM    Hematocrit, POC 35 (L) 11/26/2012 04:42 PM    HCT (POC) 39.6 04/11/2014 12:59 PM    HCT 42.0 12/21/2022 11:18 AM    PLATELET 853 91/19/1458 11:18 AM    MCV 82.0 12/21/2022 11:18 AM     Lab Results   Component Value Date/Time    Sodium 142 12/21/2022 11:18 AM    Potassium 4.8 12/21/2022 11:18 AM    Chloride 112 (H) 12/21/2022 11:18 AM    CO2 25 12/21/2022 11:18 AM    Anion gap 5 12/21/2022 11:18 AM    Glucose 166 (H) 12/21/2022 11:18 AM    BUN 29 (H) 12/21/2022 11:18 AM    Creatinine 1.56 (H) 12/21/2022 11:18 AM    BUN/Creatinine ratio 19 12/21/2022 11:18 AM    GFR est AA 37 (L) 07/25/2022 12:59 PM    GFR est non-AA 30 (L) 07/25/2022 12:59 PM    Calcium 8.6 12/21/2022 11:18 AM    Bilirubin, total 0.5 12/21/2022 11:18 AM    Alk. phosphatase 127 (H) 12/21/2022 11:18 AM    Protein, total 7.6 12/21/2022 11:18 AM    Albumin 3.5 12/21/2022 11:18 AM    Globulin 4.1 (H) 12/21/2022 11:18 AM    A-G Ratio 0.9 12/21/2022 11:18 AM    ALT (SGPT) 21 12/21/2022 11:18 AM    AST (SGOT) 10 12/21/2022 11:18 AM             Diagnosis:     1. Malignant neoplasm of upper-outer quadrant of right female breast, unspecified estrogen receptor status ([address]    2. Long term current use of aromatase inhibitor    3. Breast pain, left        Assessment and Plan:   Malignant neoplasm of upper-outer quadrant of right female breast, positive estrogen receptor   -- Patient was being followed by Dr. Howard Lopez who retired. -- Her Letrozole started on 08/08/2014. She was advised to continue Letrozole 2.5mg PO daily for full 10 years  -- 3/04/2022  Left Mammogram: No evidence of malignancy. Routine follow-up was recommended.   --Recent US and mammogram on 1/24/2023 to evaluation for the report of recent left breast pain reported no evidence for malignancy     Plan:  -- The patient was instructed to continue doing her breast exams on a monthly basis. -- The patient was advised to notify us if any new breast pain, new palpable nodule, nipple inversion, spontaneous nipple discharge especially if bloody, breast skin changes, unintentional weight loss, worsen fatigue, new bone pain or back pain, or any concerns. -- I have advised her to follow up with her PCP to continue age-appropriate cancer screening. -- I have educated her regarding lifestyle modifications, minimizing alcohol intake, refraining from smoking, healthy diet, and physical activity. Breast Pain, Resolved   -- US and Mammogram to assess for Left Breast Pain reported No evidence for malignancy. -- Patient denies pain or mass or any breast discomfort at this time     Long term use of aromatase inhibitor:   -- The patient currently taking Letrozole daily. She states she is tolerating the medication without any issue and this will be continued. -- the patient report that she is up to date with her DEXA scan from her GYN. I have advise her to follow up with PCP on checking vitamin D level.  + Advised on adequate amounts of calcium and vitamin D (800-1,000 IU per day). The higher dose of vitamin D may be needed if vitamin D deficient. Lymphedema of upper extremity following lymphadenectomy (right )  --She continues to wear her sleeve, glove, and chest garment. --We advised her to continue to avoid lifting more than 10 pounds using the right arm and hand. Follow up in 6 months or sooner if indicated. Orders Placed This Encounter    letrozole (FEMARA) 2.5 mg tablet     Sig: Take 1 Tablet by mouth every evening. Dispense:  90 Tablet     Refill:  2       Megan Nettles MD  2/1/2023      The patient has a reminder for a \"due or due soon\" health maintenance. I have asked the patient to contact primary care provider for follow-up on the health maintenance. All of patient's questions answered to their apparent satisfaction.  They verbally show understanding and agreement with aforementioned plan. Above mentioned total time spent for this encounter with more than 50% of the time spent in face-to-face counseling, reviewing previous medical charts, discussing on diagnosis and management plan going forward, and co-ordination of care.       CC: Ulysess Klinefelter, DO

## 2023-02-02 DIAGNOSIS — C50.411 MALIGNANT NEOPLASM OF UPPER-OUTER QUADRANT OF RIGHT FEMALE BREAST, UNSPECIFIED ESTROGEN RECEPTOR STATUS (HCC): Primary | ICD-10-CM

## 2023-02-02 DIAGNOSIS — Z12.31 ENCOUNTER FOR SCREENING MAMMOGRAM FOR MALIGNANT NEOPLASM OF BREAST: ICD-10-CM

## 2023-02-02 DIAGNOSIS — Z79.811 LONG TERM (CURRENT) USE OF AROMATASE INHIBITORS: ICD-10-CM

## 2023-07-12 DIAGNOSIS — C50.411 MALIGNANT NEOPLASM OF UPPER-OUTER QUADRANT OF RIGHT BREAST IN FEMALE, ESTROGEN RECEPTOR POSITIVE (HCC): ICD-10-CM

## 2023-07-12 DIAGNOSIS — E89.89 OTHER POSTPROCEDURAL ENDOCRINE AND METABOLIC COMPLICATIONS AND DISORDERS: ICD-10-CM

## 2023-07-12 DIAGNOSIS — Z79.811 LONG TERM (CURRENT) USE OF AROMATASE INHIBITORS: ICD-10-CM

## 2023-07-12 DIAGNOSIS — Z17.0 MALIGNANT NEOPLASM OF UPPER-OUTER QUADRANT OF RIGHT BREAST IN FEMALE, ESTROGEN RECEPTOR POSITIVE (HCC): ICD-10-CM

## 2023-07-12 DIAGNOSIS — Z17.0 ESTROGEN RECEPTOR POSITIVE STATUS (ER+): Primary | ICD-10-CM

## 2023-07-12 DIAGNOSIS — I89.0 LYMPHEDEMA, NOT ELSEWHERE CLASSIFIED: ICD-10-CM

## 2023-11-09 DIAGNOSIS — I65.23 CAROTID STENOSIS, ASYMPTOMATIC, BILATERAL: ICD-10-CM

## 2023-12-20 ENCOUNTER — OFFICE VISIT (OUTPATIENT)
Age: 68
End: 2023-12-20
Payer: MEDICARE

## 2023-12-20 VITALS
HEIGHT: 59 IN | DIASTOLIC BLOOD PRESSURE: 82 MMHG | HEART RATE: 88 BPM | SYSTOLIC BLOOD PRESSURE: 120 MMHG | WEIGHT: 235 LBS | OXYGEN SATURATION: 99 % | BODY MASS INDEX: 47.37 KG/M2

## 2023-12-20 DIAGNOSIS — I65.23 BILATERAL CAROTID ARTERY STENOSIS: Primary | ICD-10-CM

## 2023-12-20 DIAGNOSIS — I65.01 VERTEBRAL ARTERY OCCLUSION, RIGHT: ICD-10-CM

## 2023-12-20 PROCEDURE — 1123F ACP DISCUSS/DSCN MKR DOCD: CPT | Performed by: SURGERY

## 2023-12-20 PROCEDURE — 3074F SYST BP LT 130 MM HG: CPT | Performed by: SURGERY

## 2023-12-20 PROCEDURE — 3079F DIAST BP 80-89 MM HG: CPT | Performed by: SURGERY

## 2023-12-20 PROCEDURE — 1036F TOBACCO NON-USER: CPT | Performed by: SURGERY

## 2023-12-20 PROCEDURE — G8484 FLU IMMUNIZE NO ADMIN: HCPCS | Performed by: SURGERY

## 2023-12-20 PROCEDURE — G8417 CALC BMI ABV UP PARAM F/U: HCPCS | Performed by: SURGERY

## 2023-12-20 PROCEDURE — 1090F PRES/ABSN URINE INCON ASSESS: CPT | Performed by: SURGERY

## 2023-12-20 PROCEDURE — G8427 DOCREV CUR MEDS BY ELIG CLIN: HCPCS | Performed by: SURGERY

## 2023-12-20 PROCEDURE — 3017F COLORECTAL CA SCREEN DOC REV: CPT | Performed by: SURGERY

## 2023-12-20 PROCEDURE — 99212 OFFICE O/P EST SF 10 MIN: CPT | Performed by: SURGERY

## 2023-12-20 PROCEDURE — G8400 PT W/DXA NO RESULTS DOC: HCPCS | Performed by: SURGERY

## 2023-12-20 RX ORDER — DULAGLUTIDE 1.5 MG/.5ML
INJECTION, SOLUTION SUBCUTANEOUS
COMMUNITY
Start: 2023-11-25

## 2023-12-20 NOTE — PROGRESS NOTES
[facility]    Vascular Surgery Office Visit    Arti Nichols  Chief Complaint   Patient presents with    Carotid Disease     Follow up        History:  68 y.o. female with known carotid stenosis, identified in 2017. She was last seen in our clinic 1 year ago and the plan at that time was to repeat a carotid duplex in 1 year.  Her duplex has demonstrated chronic R VA occlusion     She returns today. She denies sx of CVA/TIA/AF. She reports feeling well otherwise. No claudication or rest pain. No syncope.       Physical Exam:    Ht 1.499 m (4' 11\")   Wt 106.6 kg (235 lb)   BMI 47.46 kg/m²      Constitutional:  Patient is well developed, well nourished, and not distressed.   HEENT: Atraumatic, normocephalic. No carotid bruits appreciated.  Eyes:   Cunjunctivae clear, no scleral icterus  Cardiovascular:  Normal rate, regular rhythm, normal heart sounds.  Pulmonary/Chest: Effort normal and breath sounds normal.  she has no wheezes or no rales.   Abdominal:   Soft, non-distended. No tenderness to palpation.   Extremities: BLE warm. No edema.  Neurological:  she  is alert and oriented x3. Motor & sensory grossly intact in all 4 limbs.   Psych: Appropriate mood and affect.     Imaging/Studies:   Carotid duplex Dec 2023: Mild bilateral ICA disease. Occluded R VA unchanged. Study is unchanged from prior 1 year ago.      Impression:  Stable, mild, asymptomatic bilateral carotid artery stenosis.     Plan:  Repeat study in 1 year  RTC after the study to discuss findings and recommendations  Continue statin      John Ramirez MD, FACS, RPVI  Vascular Surgeon  [facility]      I spent approximately 15 minutes on this patient encounter, which includes but is not limited to performing a history and physical exam, reviewing primary care and consultant documentation, reviewing imaging/studies, and speaking with her regarding my impression and plan.       Past

## 2024-02-08 ENCOUNTER — HOSPITAL ENCOUNTER (OUTPATIENT)
Facility: HOSPITAL | Age: 69
Discharge: HOME | End: 2024-02-08
Attending: INTERNAL MEDICINE
Payer: MEDICARE

## 2024-02-08 ENCOUNTER — HOSPITAL ENCOUNTER (OUTPATIENT)
Facility: HOSPITAL | Age: 69
End: 2024-02-08
Payer: MEDICARE

## 2024-02-08 DIAGNOSIS — N28.9 KIDNEY LESION, NATIVE, LEFT: ICD-10-CM

## 2024-02-08 DIAGNOSIS — N28.1 CYST OF LEFT KIDNEY: ICD-10-CM

## 2024-02-08 DIAGNOSIS — N20.0 KIDNEY STONE: ICD-10-CM

## 2024-02-08 DIAGNOSIS — Z12.31 VISIT FOR SCREENING MAMMOGRAM: ICD-10-CM

## 2024-02-08 LAB — CREAT UR-MCNC: 1.7 MG/DL (ref 0.6–1.3)

## 2024-02-08 PROCEDURE — 74178 CT ABD&PLV WO CNTR FLWD CNTR: CPT

## 2024-02-08 PROCEDURE — 77063 BREAST TOMOSYNTHESIS BI: CPT

## 2024-02-08 PROCEDURE — 6360000004 HC RX CONTRAST MEDICATION

## 2024-02-08 PROCEDURE — 82565 ASSAY OF CREATININE: CPT

## 2024-02-08 RX ADMIN — IOPAMIDOL 72 ML: 755 INJECTION, SOLUTION INTRAVENOUS at 14:31

## 2024-02-13 NOTE — RESULT ENCOUNTER NOTE
Please let patient know that her recent CT scan has returned showing no lesions in the left kidney, as described on ultrasound.  Great news!  She does have a large stone at the bottom of the left kidney measuring about 13 x 7 to be relatively stable.  This is above a passable size, if she would like to discuss elective treatment of this, we can set up an appointment to do so. Otherwise, the stones in both kidneys look pretty stable.  If they are not bothering her, we can follow-up in 1 year with a renal ultrasound prior. Thanks!

## 2024-06-05 NOTE — PROGRESS NOTES
----- Message from Red Meek sent at 6/5/2024  1:38 PM EDT -----  Regarding: FW: Left a message for patient  Pt scheduled with Hetal Prater for 7/31/24  ----- Message -----  From: Red Meek  Sent: 5/24/2024   9:10 AM EDT  To: Red Meek; #  Subject: Left a message for patient                       Left a message for patient to schedule NP OV for GERD. Referral from Percy Leavitt at [facility]       Hospitalist Progress Note    Patient: Reji Garces MRN: [id number]  CSN: [id number]    YOB: 1955  Age: 75 y.o. Sex: female    DOA: 1/18/2020 LOS:  LOS: 2 days          Patient reports she is hungry. She still has flank pain, she understands stones need to be treated in the setting of recurrent uti, and she wants Dr. Treva Garner to be her surgeon. She has insulin pump attached, and it's working. She states dysuria has resolved since tx she received here, and she overall feels better. Assessment/Plan       1. Sepsis (leukocytosis, fever, tachycardia) poa - source m/l uti - follow blood cx.   2. UTI GNR, complicated in the setting of staghorn calculi - I discussed w/ Dr. Marc Gomez [facility] [place], and 21 days of antibiotics recommended. Close op f/u with Dr. Treva Garner - his office to arrange and order placed. 3. DM2 - insulin pump ordered  4. morbid Obesity Body mass index is 48.47 kg/m². 5. Anemia, microcytic, hypochromic - check studies. 6. ckd 3 close to baseline  7. Hx breast ca s/p right modified radical mastectomy in March of 2014  8. dvt prophylaxis  9. Full code. Pt / ot. I discussed the case with Dr. Marc Gomez. Additional Notes:      Case discussed with:  [x]Patient  []Family  [x]Nursing  []Case Management  DVT Prophylaxis:  []Lovenox  [x]Hep SQ  []SCDs  []Coumadin   []On Heparin gtt    Vital signs/Intake and Output:  Visit Vitals  /66   Pulse 69   Temp 98.6 °F (37 °C)   Resp 20   Ht 4' 11\" (1.499 m)   Wt 108.9 kg (240 lb)   SpO2 96%   Breastfeeding No   BMI 48.47 kg/m²     Current Shift:  01/20 0701 - 01/20 1900  In: -   Out: 200 [Urine:200]  Last three shifts:  01/18 1901 - 01/20 0700  In: 1064 [I.V.:1064]  Out: 400 [Urine:400]    Awake alert and oriented x4. Obese, lying in bed nad  Ncat. Perrl. RRR   Right mastectomy, site clean  cta b.l   abd soft nt nd nabs. Insulin pump in situ  Left flank ttp  No edema.  dp 2+ b.l  No focal deficit  No rash    Medications Reviewed      Labs: Results:       Chemistry Recent Labs     01/19/20  0520 01/18/20  1800   * 218*    143   K 4.1 4.2   * 111   CO2 23 27   BUN 27* 33*   CREA 1.65* 1.85*   CA 8.1* 9.3   AGAP 5 5   BUCR 16 18   AP  --  87   TP  --  7.6   ALB  --  3.3*   GLOB  --  4.3*   AGRAT  --  0.8      CBC w/Diff Recent Labs     01/19/20  0520 01/18/20  1800   WBC 16.3* 12.5   RBC 4.22 5.13   HGB 10.9* 13.2   HCT 33.1* 40.4    269   GRANS 80* 90*   LYMPH 14* 6*   EOS 0 2      Cardiac Enzymes No results for input(s): CPK, CKND1, ELICEO in the last 72 hours. No lab exists for component: CKRMB, TROIP   Coagulation No results for input(s): PTP, INR, APTT, INREXT in the last 72 hours. Lipid Panel Lab Results   Component Value Date/Time    Cholesterol, total 281 (H) 09/25/2010 07:20 AM    HDL Cholesterol 39 (L) 09/25/2010 07:20 AM    LDL, calculated 207.6 (H) 09/25/2010 07:20 AM    VLDL, calculated 34.4 09/25/2010 07:20 AM    Triglyceride 172 (H) 09/25/2010 07:20 AM    CHOL/HDL Ratio 7.2 (H) 09/25/2010 07:20 AM      BNP No results for input(s): BNPP in the last 72 hours.    Liver Enzymes Recent Labs     01/18/20  1800   TP 7.6   ALB 3.3*   AP 87   SGOT 22      Thyroid Studies Lab Results   Component Value Date/Time    TSH 2.81 03/30/2014 07:13 AM        Procedures/imaging: see electronic medical records for all procedures/Xrays and details which were not copied into this note but were reviewed prior to creation of Plan                          \

## 2024-12-31 ENCOUNTER — HOSPITAL ENCOUNTER (OUTPATIENT)
Facility: HOSPITAL | Age: 69
Discharge: HOME | End: 2025-01-03
Payer: MEDICARE

## 2024-12-31 DIAGNOSIS — R09.89 CHEST CONGESTION: ICD-10-CM

## 2024-12-31 PROCEDURE — 71046 X-RAY EXAM CHEST 2 VIEWS: CPT

## 2025-01-15 ENCOUNTER — OFFICE VISIT (OUTPATIENT)
Age: 70
End: 2025-01-15
Payer: COMMERCIAL

## 2025-01-15 VITALS
HEIGHT: 59 IN | SYSTOLIC BLOOD PRESSURE: 120 MMHG | WEIGHT: 230 LBS | DIASTOLIC BLOOD PRESSURE: 82 MMHG | BODY MASS INDEX: 46.37 KG/M2 | HEART RATE: 82 BPM | OXYGEN SATURATION: 97 %

## 2025-01-15 DIAGNOSIS — I65.23 BILATERAL CAROTID ARTERY STENOSIS: Primary | ICD-10-CM

## 2025-01-15 DIAGNOSIS — I65.01 VERTEBRAL ARTERY OCCLUSION, RIGHT: ICD-10-CM

## 2025-01-15 PROCEDURE — G8400 PT W/DXA NO RESULTS DOC: HCPCS | Performed by: SURGERY

## 2025-01-15 PROCEDURE — 3079F DIAST BP 80-89 MM HG: CPT | Performed by: SURGERY

## 2025-01-15 PROCEDURE — 1036F TOBACCO NON-USER: CPT | Performed by: SURGERY

## 2025-01-15 PROCEDURE — 99213 OFFICE O/P EST LOW 20 MIN: CPT | Performed by: SURGERY

## 2025-01-15 PROCEDURE — 1090F PRES/ABSN URINE INCON ASSESS: CPT | Performed by: SURGERY

## 2025-01-15 PROCEDURE — 1123F ACP DISCUSS/DSCN MKR DOCD: CPT | Performed by: SURGERY

## 2025-01-15 PROCEDURE — G8417 CALC BMI ABV UP PARAM F/U: HCPCS | Performed by: SURGERY

## 2025-01-15 PROCEDURE — 3074F SYST BP LT 130 MM HG: CPT | Performed by: SURGERY

## 2025-01-15 PROCEDURE — 3017F COLORECTAL CA SCREEN DOC REV: CPT | Performed by: SURGERY

## 2025-01-15 PROCEDURE — G8428 CUR MEDS NOT DOCUMENT: HCPCS | Performed by: SURGERY

## 2025-01-15 NOTE — PROGRESS NOTES
[facility]    Vascular Surgery Office Visit    Arti Nichols  Chief Complaint   Patient presents with    Carotid Disease     Follow up with studies        History:  69 y.o. female with known carotid stenosis, identified in 2017. She was last seen in our clinic 1 year ago and the plan at that time was to repeat a carotid duplex in 1 year.  Her duplex has demonstrated chronic R VA occlusion.   She returns today. She denies sx of CVA/TIA/AF. She reports feeling well otherwise. No claudication or rest pain. No syncope.       Physical Exam:    /82   Pulse 82   Ht 1.499 m (4' 11\")   Wt 104.3 kg (230 lb)   SpO2 97%   BMI 46.45 kg/m²      Constitutional:  Patient is well developed, well nourished, and not distressed.   HEENT: Atraumatic, normocephalic. No carotid bruits appreciated.  Eyes:   Cunjunctivae clear, no scleral icterus  Cardiovascular:  Normal rate, regular rhythm, normal heart sounds.  Pulmonary/Chest: Effort normal and breath sounds normal.  she has no wheezes or no rales.   Abdominal:   Soft, non-distended. No tenderness to palpation.   Extremities: BLE warm. No edema.  Neurological:  she  is alert and oriented x3. Motor & sensory grossly intact in all 4 limbs.   Psych: Appropriate mood and affect.     Imaging/Studies:   Dec 2024  Carotid duplex: Mild bilateral ICA disease. R VA occlusion.     Dec 2023:   Carotid duplex: Mild bilateral ICA disease. Occluded R VA unchanged. Study is unchanged from prior 1 year ago.      Impression:  Stable, mild, asymptomatic bilateral carotid artery stenosis.   Chronic R vertebral artery occlusion. No intervention or f/u needed.     Plan:  RTC in 1 year with a repeat carotid duplex (ordered)  Continue statin and ASA. Confirmed with pt that she is taking ASA.   We discussed cerebral perfusion, and the fact that often one of the vertebral arteries is usually dominant. In the setting of PAD, vertebral artery disease may also be present. It

## 2025-03-14 ENCOUNTER — TRANSCRIBE ORDERS (OUTPATIENT)
Facility: HOSPITAL | Age: 70
End: 2025-03-14

## 2025-03-14 DIAGNOSIS — Z12.31 ENCOUNTER FOR SCREENING MAMMOGRAM FOR MALIGNANT NEOPLASM OF BREAST: Primary | ICD-10-CM

## 2025-03-14 DIAGNOSIS — Z79.811 LONG TERM CURRENT USE OF AROMATASE INHIBITOR: Primary | ICD-10-CM

## 2025-04-03 NOTE — ED NOTES
Assumed care of pt.   Pt currently in bathroom providing urine specimen Using lubricating eye drops during day; will Rx ophthalmic ointment to use on eyes for nighttime lubrication.

## 2025-04-17 ENCOUNTER — HOSPITAL ENCOUNTER (OUTPATIENT)
Facility: HOSPITAL | Age: 70
Discharge: HOME | End: 2025-04-20
Payer: MEDICARE

## 2025-04-17 VITALS — HEIGHT: 59 IN | BODY MASS INDEX: 46.36 KG/M2 | WEIGHT: 229.94 LBS

## 2025-04-17 DIAGNOSIS — Z79.811 LONG TERM CURRENT USE OF AROMATASE INHIBITOR: ICD-10-CM

## 2025-04-17 DIAGNOSIS — Z12.31 ENCOUNTER FOR SCREENING MAMMOGRAM FOR MALIGNANT NEOPLASM OF BREAST: ICD-10-CM

## 2025-04-17 PROCEDURE — 77080 DXA BONE DENSITY AXIAL: CPT

## 2025-04-17 PROCEDURE — 77063 BREAST TOMOSYNTHESIS BI: CPT
